# Patient Record
Sex: MALE | Race: WHITE | Employment: UNEMPLOYED | URBAN - NONMETROPOLITAN AREA
[De-identification: names, ages, dates, MRNs, and addresses within clinical notes are randomized per-mention and may not be internally consistent; named-entity substitution may affect disease eponyms.]

---

## 2021-09-12 ENCOUNTER — APPOINTMENT (OUTPATIENT)
Dept: MRI IMAGING | Age: 66
DRG: 552 | End: 2021-09-12
Payer: MEDICARE

## 2021-09-12 ENCOUNTER — APPOINTMENT (OUTPATIENT)
Dept: GENERAL RADIOLOGY | Age: 66
DRG: 552 | End: 2021-09-12
Payer: MEDICARE

## 2021-09-12 ENCOUNTER — HOSPITAL ENCOUNTER (INPATIENT)
Age: 66
LOS: 8 days | Discharge: HOME | DRG: 552 | End: 2021-09-20
Attending: EMERGENCY MEDICINE | Admitting: ORTHOPAEDIC SURGERY
Payer: MEDICARE

## 2021-09-12 DIAGNOSIS — M54.42 ACUTE LEFT-SIDED LOW BACK PAIN WITH LEFT-SIDED SCIATICA: Primary | ICD-10-CM

## 2021-09-12 DIAGNOSIS — R93.89 ABNORMAL MRI: ICD-10-CM

## 2021-09-12 PROBLEM — M54.9 BACK PAIN: Status: ACTIVE | Noted: 2021-09-12

## 2021-09-12 PROBLEM — M54.59 INTRACTABLE LOW BACK PAIN: Status: ACTIVE | Noted: 2021-09-12

## 2021-09-12 LAB
ANION GAP SERPL CALCULATED.3IONS-SCNC: 9 MEQ/L (ref 8–16)
BASOPHILS # BLD: 0.4 %
BASOPHILS ABSOLUTE: 0 THOU/MM3 (ref 0–0.1)
BUN BLDV-MCNC: 12 MG/DL (ref 7–22)
C-REACTIVE PROTEIN: 2.27 MG/DL (ref 0–1)
CALCIUM SERPL-MCNC: 9.7 MG/DL (ref 8.5–10.5)
CHLORIDE BLD-SCNC: 102 MEQ/L (ref 98–111)
CO2: 28 MEQ/L (ref 23–33)
CREAT SERPL-MCNC: 0.6 MG/DL (ref 0.4–1.2)
EOSINOPHIL # BLD: 0.4 %
EOSINOPHILS ABSOLUTE: 0 THOU/MM3 (ref 0–0.4)
ERYTHROCYTE [DISTWIDTH] IN BLOOD BY AUTOMATED COUNT: 13.1 % (ref 11.5–14.5)
ERYTHROCYTE [DISTWIDTH] IN BLOOD BY AUTOMATED COUNT: 48.2 FL (ref 35–45)
GFR SERPL CREATININE-BSD FRML MDRD: > 90 ML/MIN/1.73M2
GLUCOSE BLD-MCNC: 103 MG/DL (ref 70–108)
HCT VFR BLD CALC: 46.3 % (ref 42–52)
HEMOGLOBIN: 15.7 GM/DL (ref 14–18)
IMMATURE GRANS (ABS): 0.04 THOU/MM3 (ref 0–0.07)
IMMATURE GRANULOCYTES: 0.4 %
LYMPHOCYTES # BLD: 15.3 %
LYMPHOCYTES ABSOLUTE: 1.7 THOU/MM3 (ref 1–4.8)
MCH RBC QN AUTO: 34.1 PG (ref 26–33)
MCHC RBC AUTO-ENTMCNC: 33.9 GM/DL (ref 32.2–35.5)
MCV RBC AUTO: 100.7 FL (ref 80–94)
MONOCYTES # BLD: 10.2 %
MONOCYTES ABSOLUTE: 1.2 THOU/MM3 (ref 0.4–1.3)
NUCLEATED RED BLOOD CELLS: 0 /100 WBC
PLATELET # BLD: 236 THOU/MM3 (ref 130–400)
PMV BLD AUTO: 9.1 FL (ref 9.4–12.4)
POTASSIUM REFLEX MAGNESIUM: 4.3 MEQ/L (ref 3.5–5.2)
RBC # BLD: 4.6 MILL/MM3 (ref 4.7–6.1)
SEDIMENTATION RATE, ERYTHROCYTE: 3 MM/HR (ref 0–10)
SEG NEUTROPHILS: 73.3 %
SEGMENTED NEUTROPHILS ABSOLUTE COUNT: 8.3 THOU/MM3 (ref 1.8–7.7)
SODIUM BLD-SCNC: 139 MEQ/L (ref 135–145)
WBC # BLD: 11.3 THOU/MM3 (ref 4.8–10.8)

## 2021-09-12 PROCEDURE — 93005 ELECTROCARDIOGRAM TRACING: CPT | Performed by: PHYSICIAN ASSISTANT

## 2021-09-12 PROCEDURE — 87040 BLOOD CULTURE FOR BACTERIA: CPT

## 2021-09-12 PROCEDURE — 96375 TX/PRO/DX INJ NEW DRUG ADDON: CPT

## 2021-09-12 PROCEDURE — 6360000002 HC RX W HCPCS

## 2021-09-12 PROCEDURE — 72156 MRI NECK SPINE W/O & W/DYE: CPT

## 2021-09-12 PROCEDURE — 96374 THER/PROPH/DIAG INJ IV PUSH: CPT

## 2021-09-12 PROCEDURE — 71045 X-RAY EXAM CHEST 1 VIEW: CPT

## 2021-09-12 PROCEDURE — 1200000000 HC SEMI PRIVATE

## 2021-09-12 PROCEDURE — 99283 EMERGENCY DEPT VISIT LOW MDM: CPT

## 2021-09-12 PROCEDURE — 80048 BASIC METABOLIC PNL TOTAL CA: CPT

## 2021-09-12 PROCEDURE — 2580000003 HC RX 258: Performed by: PHYSICIAN ASSISTANT

## 2021-09-12 PROCEDURE — A9579 GAD-BASE MR CONTRAST NOS,1ML: HCPCS

## 2021-09-12 PROCEDURE — 72158 MRI LUMBAR SPINE W/O & W/DYE: CPT

## 2021-09-12 PROCEDURE — 6370000000 HC RX 637 (ALT 250 FOR IP)

## 2021-09-12 PROCEDURE — 85651 RBC SED RATE NONAUTOMATED: CPT

## 2021-09-12 PROCEDURE — 96372 THER/PROPH/DIAG INJ SC/IM: CPT

## 2021-09-12 PROCEDURE — 36415 COLL VENOUS BLD VENIPUNCTURE: CPT

## 2021-09-12 PROCEDURE — 6360000004 HC RX CONTRAST MEDICATION

## 2021-09-12 PROCEDURE — 96376 TX/PRO/DX INJ SAME DRUG ADON: CPT

## 2021-09-12 PROCEDURE — 72157 MRI CHEST SPINE W/O & W/DYE: CPT

## 2021-09-12 PROCEDURE — 86140 C-REACTIVE PROTEIN: CPT

## 2021-09-12 PROCEDURE — 85025 COMPLETE CBC W/AUTO DIFF WBC: CPT

## 2021-09-12 PROCEDURE — 6370000000 HC RX 637 (ALT 250 FOR IP): Performed by: PHYSICIAN ASSISTANT

## 2021-09-12 RX ORDER — MIDAZOLAM HYDROCHLORIDE 1 MG/ML
4 INJECTION INTRAMUSCULAR; INTRAVENOUS ONCE
Status: COMPLETED | OUTPATIENT
Start: 2021-09-12 | End: 2021-09-12

## 2021-09-12 RX ORDER — ONDANSETRON 4 MG/1
4 TABLET, ORALLY DISINTEGRATING ORAL EVERY 8 HOURS PRN
Status: DISCONTINUED | OUTPATIENT
Start: 2021-09-12 | End: 2021-09-20 | Stop reason: HOSPADM

## 2021-09-12 RX ORDER — SODIUM CHLORIDE 9 MG/ML
25 INJECTION, SOLUTION INTRAVENOUS PRN
Status: DISCONTINUED | OUTPATIENT
Start: 2021-09-12 | End: 2021-09-20 | Stop reason: HOSPADM

## 2021-09-12 RX ORDER — MIDAZOLAM HYDROCHLORIDE 1 MG/ML
2 INJECTION INTRAMUSCULAR; INTRAVENOUS ONCE
Status: COMPLETED | OUTPATIENT
Start: 2021-09-12 | End: 2021-09-12

## 2021-09-12 RX ORDER — CYCLOBENZAPRINE HCL 10 MG
10 TABLET ORAL 3 TIMES DAILY PRN
Status: DISCONTINUED | OUTPATIENT
Start: 2021-09-12 | End: 2021-09-20 | Stop reason: HOSPADM

## 2021-09-12 RX ORDER — MIDAZOLAM HYDROCHLORIDE 1 MG/ML
INJECTION INTRAMUSCULAR; INTRAVENOUS
Status: COMPLETED
Start: 2021-09-12 | End: 2021-09-12

## 2021-09-12 RX ORDER — FENTANYL CITRATE 50 UG/ML
50 INJECTION, SOLUTION INTRAMUSCULAR; INTRAVENOUS ONCE
Status: COMPLETED | OUTPATIENT
Start: 2021-09-12 | End: 2021-09-12

## 2021-09-12 RX ORDER — CYCLOBENZAPRINE HCL 10 MG
10 TABLET ORAL ONCE
Status: COMPLETED | OUTPATIENT
Start: 2021-09-12 | End: 2021-09-12

## 2021-09-12 RX ORDER — SODIUM CHLORIDE 0.9 % (FLUSH) 0.9 %
5-40 SYRINGE (ML) INJECTION EVERY 12 HOURS SCHEDULED
Status: DISCONTINUED | OUTPATIENT
Start: 2021-09-12 | End: 2021-09-20 | Stop reason: HOSPADM

## 2021-09-12 RX ORDER — FENTANYL CITRATE 50 UG/ML
INJECTION, SOLUTION INTRAMUSCULAR; INTRAVENOUS
Status: COMPLETED
Start: 2021-09-12 | End: 2021-09-12

## 2021-09-12 RX ORDER — SODIUM CHLORIDE 9 MG/ML
INJECTION, SOLUTION INTRAVENOUS CONTINUOUS
Status: DISCONTINUED | OUTPATIENT
Start: 2021-09-12 | End: 2021-09-20 | Stop reason: HOSPADM

## 2021-09-12 RX ORDER — KETOROLAC TROMETHAMINE 30 MG/ML
15 INJECTION, SOLUTION INTRAMUSCULAR; INTRAVENOUS ONCE
Status: DISCONTINUED | OUTPATIENT
Start: 2021-09-12 | End: 2021-09-12

## 2021-09-12 RX ORDER — OXYCODONE HYDROCHLORIDE 5 MG/1
5 TABLET ORAL EVERY 4 HOURS PRN
Status: DISCONTINUED | OUTPATIENT
Start: 2021-09-12 | End: 2021-09-20 | Stop reason: HOSPADM

## 2021-09-12 RX ORDER — OXYCODONE HYDROCHLORIDE 5 MG/1
10 TABLET ORAL EVERY 4 HOURS PRN
Status: DISCONTINUED | OUTPATIENT
Start: 2021-09-12 | End: 2021-09-20 | Stop reason: HOSPADM

## 2021-09-12 RX ORDER — MORPHINE SULFATE 2 MG/ML
2 INJECTION, SOLUTION INTRAMUSCULAR; INTRAVENOUS
Status: DISCONTINUED | OUTPATIENT
Start: 2021-09-12 | End: 2021-09-20 | Stop reason: HOSPADM

## 2021-09-12 RX ORDER — FENTANYL CITRATE 50 UG/ML
100 INJECTION, SOLUTION INTRAMUSCULAR; INTRAVENOUS ONCE
Status: COMPLETED | OUTPATIENT
Start: 2021-09-12 | End: 2021-09-12

## 2021-09-12 RX ORDER — MORPHINE SULFATE 4 MG/ML
4 INJECTION, SOLUTION INTRAMUSCULAR; INTRAVENOUS
Status: DISCONTINUED | OUTPATIENT
Start: 2021-09-12 | End: 2021-09-20 | Stop reason: HOSPADM

## 2021-09-12 RX ORDER — KETOROLAC TROMETHAMINE 30 MG/ML
15 INJECTION, SOLUTION INTRAMUSCULAR; INTRAVENOUS ONCE
Status: COMPLETED | OUTPATIENT
Start: 2021-09-12 | End: 2021-09-12

## 2021-09-12 RX ORDER — POLYETHYLENE GLYCOL 3350 17 G/17G
17 POWDER, FOR SOLUTION ORAL DAILY PRN
Status: DISCONTINUED | OUTPATIENT
Start: 2021-09-12 | End: 2021-09-20 | Stop reason: HOSPADM

## 2021-09-12 RX ORDER — SODIUM CHLORIDE 0.9 % (FLUSH) 0.9 %
5-40 SYRINGE (ML) INJECTION PRN
Status: DISCONTINUED | OUTPATIENT
Start: 2021-09-12 | End: 2021-09-20 | Stop reason: HOSPADM

## 2021-09-12 RX ORDER — ONDANSETRON 2 MG/ML
4 INJECTION INTRAMUSCULAR; INTRAVENOUS EVERY 6 HOURS PRN
Status: DISCONTINUED | OUTPATIENT
Start: 2021-09-12 | End: 2021-09-20 | Stop reason: HOSPADM

## 2021-09-12 RX ADMIN — MIDAZOLAM HYDROCHLORIDE 4 MG: 1 INJECTION INTRAMUSCULAR; INTRAVENOUS at 11:37

## 2021-09-12 RX ADMIN — FENTANYL CITRATE 50 MCG: 0.05 INJECTION, SOLUTION INTRAMUSCULAR; INTRAVENOUS at 10:28

## 2021-09-12 RX ADMIN — MIDAZOLAM 4 MG: 1 INJECTION INTRAMUSCULAR; INTRAVENOUS at 11:37

## 2021-09-12 RX ADMIN — GADOTERIDOL 10 ML: 279.3 INJECTION, SOLUTION INTRAVENOUS at 12:30

## 2021-09-12 RX ADMIN — OXYCODONE 10 MG: 5 TABLET ORAL at 20:45

## 2021-09-12 RX ADMIN — SODIUM CHLORIDE: 9 INJECTION, SOLUTION INTRAVENOUS at 15:52

## 2021-09-12 RX ADMIN — MIDAZOLAM 2 MG: 1 INJECTION INTRAMUSCULAR; INTRAVENOUS at 10:28

## 2021-09-12 RX ADMIN — OXYCODONE 10 MG: 5 TABLET ORAL at 15:20

## 2021-09-12 RX ADMIN — CYCLOBENZAPRINE 10 MG: 10 TABLET, FILM COATED ORAL at 09:34

## 2021-09-12 RX ADMIN — KETOROLAC TROMETHAMINE 15 MG: 30 INJECTION, SOLUTION INTRAMUSCULAR; INTRAVENOUS at 08:29

## 2021-09-12 RX ADMIN — FENTANYL CITRATE 100 MCG: 50 INJECTION, SOLUTION INTRAMUSCULAR; INTRAVENOUS at 11:36

## 2021-09-12 ASSESSMENT — PAIN DESCRIPTION - LOCATION
LOCATION: BACK
LOCATION: BACK

## 2021-09-12 ASSESSMENT — PAIN SCALES - GENERAL
PAINLEVEL_OUTOF10: 8
PAINLEVEL_OUTOF10: 7
PAINLEVEL_OUTOF10: 7
PAINLEVEL_OUTOF10: 8
PAINLEVEL_OUTOF10: 6
PAINLEVEL_OUTOF10: 0
PAINLEVEL_OUTOF10: 6
PAINLEVEL_OUTOF10: 8
PAINLEVEL_OUTOF10: 7

## 2021-09-12 ASSESSMENT — ENCOUNTER SYMPTOMS
NAUSEA: 0
ABDOMINAL PAIN: 0
CHEST TIGHTNESS: 0
COUGH: 0
DIARRHEA: 0
SINUS PAIN: 0
SHORTNESS OF BREATH: 0
CONSTIPATION: 0
PHOTOPHOBIA: 0
BACK PAIN: 1
VOMITING: 0
ABDOMINAL DISTENTION: 0
SINUS PRESSURE: 0

## 2021-09-12 ASSESSMENT — PAIN DESCRIPTION - ONSET
ONSET: ON-GOING
ONSET: ON-GOING

## 2021-09-12 ASSESSMENT — PAIN DESCRIPTION - PROGRESSION
CLINICAL_PROGRESSION: NOT CHANGED
CLINICAL_PROGRESSION: GRADUALLY WORSENING

## 2021-09-12 ASSESSMENT — PAIN DESCRIPTION - FREQUENCY
FREQUENCY: CONTINUOUS
FREQUENCY: CONTINUOUS

## 2021-09-12 ASSESSMENT — PAIN DESCRIPTION - ORIENTATION
ORIENTATION: LOWER
ORIENTATION: LOWER

## 2021-09-12 ASSESSMENT — PAIN DESCRIPTION - PAIN TYPE
TYPE: ACUTE PAIN
TYPE: OTHER (COMMENT)

## 2021-09-12 ASSESSMENT — PAIN - FUNCTIONAL ASSESSMENT
PAIN_FUNCTIONAL_ASSESSMENT: PREVENTS OR INTERFERES SOME ACTIVE ACTIVITIES AND ADLS
PAIN_FUNCTIONAL_ASSESSMENT: PREVENTS OR INTERFERES SOME ACTIVE ACTIVITIES AND ADLS

## 2021-09-12 ASSESSMENT — PAIN DESCRIPTION - DESCRIPTORS
DESCRIPTORS: STABBING
DESCRIPTORS: SORE;ACHING;SHARP;SHOOTING

## 2021-09-12 NOTE — ED NOTES
Patient transferred to [facility] room 008 nurse informed.       Tona Mcgee  09/12/21 4722

## 2021-09-12 NOTE — ED NOTES
Report received from [facility].  Pt remains at [facility], [address] [place] [address]  09/12/21 2487

## 2021-09-12 NOTE — ED TRIAGE NOTES
Patient presents with lumbar pain that started Wednesday night. States he threw his back out. States he has has back surgery about 35 years ago.

## 2021-09-12 NOTE — ED PROVIDER NOTES
[facility]  EMERGENCY MEDICINE ATTENDING ATTESTATION      Evaluation of [facility]. Case discussed and care plan developed with resident physician. I agree with the resident physician documentation and plan as documented by him, except if my documentation differs. Patient seen, interviewed and examined by me. I reviewed the medical, surgical, family and social history, medications and allergies. I have reviewed the nursing documentation. I have reviewed the patient's vital signs and are abnormal from Mild hypertension per my interpretation. There is no height or weight on file to calculate BMI. Pulsoxymetry is normal per my interpretation. Brief H&P   Patient c/o back pain. Patient states for the last 3 days has had persistent mid and left-sided back pain, radiated to the left leg. Denies fever, chills, weight loss. Patient states his pain is worse with rest and better with movement and sitting, much worse when laying down. Patient has remote history of IVDA and history of the lumbar spine instrumentation. Physical exam is notable for well appearing, midline lumbar area scar, nontender to touch. There is midline and left paraspinal tenderness. Negative straight leg raise. Medical Decision Making   MDM:   1. Back pain, some positive red flags raising concern for possible spinal infection as a cause of this patient's back pain. Plan:    Analgesia, patient asks not to receive opiate medications. Casandra Anaya, labs   Imaging   Observation    Please see the resident physician completed note for final disposition except as documented on this attestation. I have reviewed and interpreted all available lab, radiology and ekg results available at the moment. Diagnosis, treatment and disposition plans were discussed and agreed upon by patient. This transcription was electronically signed.  It was dictated by use of voice recognition software and electronically transcribed. The transcription may contain errors not detected in proofreading.      I performed direct supervision and was present for the critical portion following procedures: None  Critical care time on this case: None    Electronically signed by Mik Beck MD on 9/12/21 at 9:32 AM EDT       Mik Beck MD  09/12/21 7494

## 2021-09-12 NOTE — ED NOTES
Pt returned to room 20 from MRI.  Pt asleep on cot, respirations easy and unlabored     Cory Meléndez, MEREDITH  09/12/21 6769

## 2021-09-12 NOTE — ED NOTES
Patient to MRI at this time.  Medicated for pain prior to exam      Stefano Daniels RN  09/12/21 5918

## 2021-09-12 NOTE — ED NOTES
Dr. Barnett Pi requesting 2mg of versed. States he will give medication. Up to 4mg if needed for patient in MRI.       Haseeb Thorne RN  09/12/21 1633

## 2021-09-12 NOTE — ED PROVIDER NOTES
[facility]NCOUN[facility]          Pt Name: Jessy Diggs  MRN: [id number]  [place] 1955  Date of evaluation: 9/12/2021  Treating Resident Physician: Ed Newell MD  Supervising Physician: Dr. Santosh Ca       Chief Complaint   Patient presents with    Back Pain     threw his back out wednesday      History obtained from the patient. HISTORY OF PRESENT ILLNESS    HPI  Jessy Diggs is a 77 y.o. male who presents to the emergency department for evaluation of back pain. Patient states that he woke up Thursday morning with severe lower back pain on the left side radiating down his thigh into his knee. Patient says the pain is accompanied with occasional numbness. Patient states the pain is 7 out of 10 intensity. Patient states the pain is worse with lying flat and better with sitting in chair. Patient says that he try topical lidocaine patches and ibuprofen with no relief. Patient says that prior to the start of the pain he was having a normal day. Patient was lying michelle the day before. 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 Patient states that he came in today because the pain becoming unbearable and he has been unable to sleep. Patient says that he was diagnosed with a ruptured disc in S4 requiring surgery about 35 years ago. 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 Patient does endorse a pack a day smoking habit as well as about 3 beers per night. Patient says he is never had withdrawals or problems related to alcohol before. Patient does endorse concurrent recreational marijuana use. Patient does deny any other recreational drugs.   Patient is not experiencing any bladder or bowel incontinence or saddle anesthesia. Patient does not recall any traumas. The patient has no other acute complaints at this time. REVIEW OF SYSTEMS   Review of Systems   Constitutional: Negative for activity change, chills, fatigue and fever. HENT: Negative for sinus pressure and sinus pain. Eyes: Negative for photophobia and visual disturbance. Respiratory: Negative for cough, chest tightness and shortness of breath. Cardiovascular: Negative for chest pain and leg swelling. Gastrointestinal: Negative for abdominal distention, abdominal pain, constipation, diarrhea, nausea and vomiting. Genitourinary: Negative for dysuria and hematuria. Musculoskeletal: Positive for back pain. Neurological: Positive for weakness and numbness. Negative for dizziness, light-headedness and headaches.          PAST MEDICAL AND SURGICAL HISTORY     Past Medical History:   Diagnosis Date    Ruptured disk      Past Surgical History:   Procedure Laterality Date    BACK SURGERY           MEDICATIONS     Current Facility-Administered Medications:     0.9 % sodium chloride infusion, , IntraVENous, Continuous, Nathalie Valerie, PA-C, Last Rate: 100 mL/hr at 09/12/21 1552, New Bag at 09/12/21 1552    sodium chloride flush 0.9 % injection 5-40 mL, 5-40 mL, IntraVENous, 2 times per day, Nathalie Valerie, PA-C    sodium chloride flush 0.9 % injection 5-40 mL, 5-40 mL, IntraVENous, PRN, Nathalie Valerie, PA-C    0.9 % sodium chloride infusion, 25 mL, IntraVENous, PRN, Nathalie Valerie, PA-C    ondansetron (ZOFRAN-ODT) disintegrating tablet 4 mg, 4 mg, Oral, Q8H PRN **OR** ondansetron (ZOFRAN) injection 4 mg, 4 mg, IntraVENous, Q6H PRN, Nathalie Valerie, PA-C    polyethylene glycol (GLYCOLAX) packet 17 g, 17 g, Oral, Daily PRN, Nathalie Valerie, PA-C    enoxaparin (LOVENOX) injection 40 mg, 40 mg, SubCUTAneous, Nightly, Nathalie Valerie, PA-C    morphine (PF) injection 2 mg, 2 mg, IntraVENous, Q2H PRN **OR** morphine injection 4 mg, 4 mg, IntraVENous, Q2H PRN, Maurilio Reyes PA-C    oxyCODONE (ROXICODONE) immediate release tablet 5 mg, 5 mg, Oral, Q4H PRN **OR** oxyCODONE (ROXICODONE) immediate release tablet 10 mg, 10 mg, Oral, Q4H PRN, Maurilio Reyes PA-C, 10 mg at 09/12/21 1520    cyclobenzaprine (FLEXERIL) tablet 10 mg, 10 mg, Oral, TID PRN, Maurilio Reyes PA-C      SOCIAL HISTORY     Social History     Social History Narrative    Not on file     Social History     Tobacco Use    Smoking status: Current Every Day Smoker     Packs/day: 1.00     Years: 40.00     Pack years: 40.00     Types: Cigarettes   Substance Use Topics    Alcohol use: Yes     Alcohol/week: 14.0 standard drinks     Types: 14 Cans of beer per week    Drug use: Not Currently     Types: IV, Opiates , Marijuana         ALLERGIES     Allergies   Allergen Reactions    Compazine [Prochlorperazine]          FAMILY HISTORY     Family History   Family history unknown: Yes         PREVIOUS RECORDS   Previous records reviewed: Patient previously seen in 2012. Medical record unavailbale. Jacquie Kurtz PHYSICAL EXAM     ED Triage Vitals [09/12/21 0736]   BP Temp Temp src Pulse Resp SpO2 Height Weight   (!) 129/93 -- -- 73 17 97 % -- --     Initial vital signs and nursing assessment reviewed and normal. Body mass index is 22.41 kg/m². Pulsoximetry is normal per my interpretation. Additional Vital Signs:  Vitals:    09/12/21 1445   BP: (!) 169/83   Pulse: 57   Resp: 16   Temp: 97.6 °F (36.4 °C)   SpO2: 99%       Physical Exam  Vitals and nursing note reviewed. Constitutional:       Appearance: He is normal weight. HENT:      Head: Normocephalic and atraumatic. Eyes:      Extraocular Movements: Extraocular movements intact. Pupils: Pupils are equal, round, and reactive to light. Cardiovascular:      Rate and Rhythm: Normal rate and regular rhythm. Pulses: Normal pulses. Heart sounds: Normal heart sounds.    Pulmonary:      Effort: Pulmonary effort is normal.      Breath sounds: Normal breath sounds. Abdominal:      General: Bowel sounds are normal.      Palpations: Abdomen is soft. Musculoskeletal:      Cervical back: Normal.      Thoracic back: Normal.      Lumbar back: Tenderness present. No swelling, edema, deformity, signs of trauma or lacerations. Decreased range of motion. No scoliosis. Right hip: Normal.      Left hip: Tenderness present. Comments: Patient has decreased range of motion of the left side secondary to back pain. There is an area of focal tenderness lateral to the paraspinal muscles of the lower back. Unable to perform straight leg test secondary to patient's inability to lie flat. Skin:     General: Skin is warm and dry. Capillary Refill: Capillary refill takes less than 2 seconds. Neurological:      General: No focal deficit present. Mental Status: He is alert. Cranial Nerves: Cranial nerves are intact. Sensory: Sensory deficit present. Motor: Weakness present. No tremor or atrophy. Coordination: Coordination is intact. Gait: Gait abnormal.      Comments: Patient expresses decreased sensation on the lateral left thigh compared to the right. Patient also has 4 out of 5 strength on the left compared to 5 out of 5 on the right. Patient's gait is abnormal secondary to lower back pain. MEDICAL DECISION MAKING   Initial Assessment:   1.  Claudication versus sciatica  Plan:    Toradol IM for pain and check temperature then reevaluate        ED RESULTS   Laboratory results:  Labs Reviewed   CBC WITH AUTO DIFFERENTIAL - Abnormal; Notable for the following components:       Result Value    WBC 11.3 (*)     RBC 4.60 (*)     .7 (*)     MCH 34.1 (*)     RDW-SD 48.2 (*)     MPV 9.1 (*)     Segs Absolute 8.3 (*)     All other components within normal limits   C-REACTIVE PROTEIN - Abnormal; Notable for the following components:    CRP 2.27 (*)     All other components within normal limits   CULTURE, BLOOD 1 CULTURE, BLOOD 2   BASIC METABOLIC PANEL W/ REFLEX TO MG FOR LOW K   SEDIMENTATION RATE   ANION GAP   GLOMERULAR FILTRATION RATE, ESTIMATED   URINALYSIS WITH MICROSCOPIC       Radiologic studies results:  XR CHEST PORTABLE   Final Result      No acute intrathoracic process. **This report has been created using voice recognition software. It may contain minor errors which are inherent in voice recognition technology. **      Final report electronically signed by Dr. Hali Griffin on 9/12/2021 3:14 PM      MRI LUMBAR SPINE W WO CONTRAST   Final Result       1. Severe spinal canal stenosis at the L2-3 and L3-4 levels due to a combination of degenerative changes. 2. Enhancement on the right at the L2-3 level along the disc margin. There is no associated bone destruction. There is no fluid signal in the disc. There is some enhancement associated the facet joints bilaterally at the L2-3 and L3-4 level, particularly    on the left facet joint of L3-4. There is also some enhancement surrounding the thecal sac and there is some enhancement of the nerve roots of the cauda equina. There is no bone destruction or fluid in the disc space to suggest infection in the    vertebral bodies and adjacent discs. There is no epidural abscess. The enhancement is most likely related to the degenerative changes and severe spinal canal stenosis with venous engorgement. Early infection cannot be excluded. 3. Spinal canal and foraminal stenosis at other lumbar levels as described. These findings were discussed with Dr. Timothy Car, at 1:45 PM on 9/12/2021. **This report has been created using voice recognition software. It may contain minor errors which are inherent in voice recognition technology. **      Final report electronically signed by Dr. Rayne Lundborg on 9/12/2021 1:51 PM      MRI CERVICAL SPINE W WO CONTRAST   Final Result      1.  Severe spinal canal stenosis with severe bilateral foraminal stenosis at the C5-6 level due to a combination of degenerative changes. 2. Spinal canal and foraminal stenosis at the other cervical levels as described. 3. No evidence of infection in the spine. **This report has been created using voice recognition software. It may contain minor errors which are inherent in voice recognition technology. **      Final report electronically signed by Dr. Sapna Reis on 9/12/2021 1:32 PM      MRI THORACIC SPINE W WO CONTRAST   Final Result    Degenerative changes in the breast spine without significant spinal canal stenosis at any level. **This report has been created using voice recognition software. It may contain minor errors which are inherent in voice recognition technology. **      Final report electronically signed by Dr. Sapna Reis on 9/12/2021 1:32 PM          ED Medications administered this visit:   Medications   0.9 % sodium chloride infusion ( IntraVENous New Bag 9/12/21 9772)   sodium chloride flush 0.9 % injection 5-40 mL (has no administration in time range)   sodium chloride flush 0.9 % injection 5-40 mL (has no administration in time range)   0.9 % sodium chloride infusion (has no administration in time range)   ondansetron (ZOFRAN-ODT) disintegrating tablet 4 mg (has no administration in time range)     Or   ondansetron (ZOFRAN) injection 4 mg (has no administration in time range)   polyethylene glycol (GLYCOLAX) packet 17 g (has no administration in time range)   enoxaparin (LOVENOX) injection 40 mg (has no administration in time range)   morphine (PF) injection 2 mg (has no administration in time range)     Or   morphine injection 4 mg (has no administration in time range)   oxyCODONE (ROXICODONE) immediate release tablet 5 mg ( Oral See Alternative 9/12/21 1520)     Or   oxyCODONE (ROXICODONE) immediate release tablet 10 mg (10 mg Oral Given 9/12/21 1520)   cyclobenzaprine (FLEXERIL) tablet 10 mg (has no administration in time range)   ketorolac (TORADOL) injection 15 mg (15 mg IntraMUSCular Given 9/12/21 0829)   cyclobenzaprine (FLEXERIL) tablet 10 mg (10 mg Oral Given 9/12/21 0934)   midazolam (VERSED) injection 2 mg (2 mg IntraVENous Given 9/12/21 1028)   fentaNYL (SUBLIMAZE) injection 50 mcg (50 mcg IntraVENous Given 9/12/21 1028)   fentaNYL (SUBLIMAZE) injection 100 mcg (100 mcg IntraVENous Given by Other 9/12/21 1136)   midazolam (VERSED) injection 4 mg (4 mg IntraVENous Given by Other 9/12/21 1137)   gadoteridol (PROHANCE) injection 10 mL (10 mLs IntraVENous Given 9/12/21 1230)         ED COURSE     ED Course as of Sep 12 1647   Sun Sep 12, 2021   0820 Patient states that he is uninsured and is concerned about expensive medical bills. Patient is requesting minimal testing if we can avoid. Given the patient's remote IV heroin use there is concern for vertebral involvement. Current plan is to give some pain relief and check temperature. If pain is controlled with Toradol and temperature is within normal limit limits vertebral infection is less likely and otherwise patient will need more intense work-up for possible spinal infection. Discussed with patient patient is in agreement with this plan. Will reevaluate. [DARIELA]   [id number] Patient is afebrile   Temp: 98 °F (36.7 °C) [DARIELA]   1159 She was initially given 2 of Versed and 50 of fentanyl for pain control and anxiolysis however patient was not well controlled. Patient was given an additional 2 of Versed at 1048 and 2 more Versed at 1052 and patient was still unable to lie flat for the procedure. Patient was then given 50 of fentanyl at 1108 and then 50 more at 1119 and patient was finally able to tolerate lying on his back for procedure.     [DARIELA]   1305 WBC(!): 11.3 [DARIELA]   1305 C-reactive protein [DARIELA]   0946 Basic Metabolic Panel w/ Reflex to MG:    Sodium 139   Potassium 4.3   Chloride 102   CO2 28   Glucose 103   BUN 12   Creatinine 0.6   Calcium 9.7 [DARIELA]   1306 Sedimentation Rate:    Sed Rate 3 [DARIELA]   1306 MRI CERVICAL SPINE W WO CONTRAST [DARIELA]   2037 MRI LUMBAR SPINE W WO CONTRAST [DARIELA]   0466 Right spine showed moderate stenosis in the area of his pain. Spoke with Dr. Nick Duncan who is going to review the patient's MRI and give recommendations. MRI THORACIC SPINE W WO CONTRAST [DARIELA]   8450 Orthopedic surgery accepted admission to [address] [place] [address]    [DARIELA]      ED Course User Index  [DARIELA] Neva Parsons MD           MEDICATION CHANGES     There are no discharge medications for this patient. FINAL DISPOSITION     Final diagnoses:   Acute left-sided low back pain with left-sided sciatica   Abnormal MRI     Condition: condition: stable  Dispo: Admit to med/surg floor      This transcription was electronically signed. Parts of this transcriptions may have been dictated by use of voice recognition software and electronically transcribed, and parts may have been transcribed with the assistance of an ED scribe. The transcription may contain errors not detected in proofreading. Please refer to my supervising physician's documentation if my documentation differs.     Electronically Signed: Aris Max MD, 09/12/21, 4:47 PM          Neva Parsons MD  Resident  09/12/21 2386

## 2021-09-12 NOTE — ED NOTES
ED to inpatient nurses report    Chief Complaint   Patient presents with    Back Pain     threw his back out wednesday       Present to ED from home  LOC: alert and orientated to name, place, date  Vital signs   Vitals:    09/12/21 0934 09/12/21 0935 09/12/21 1030 09/12/21 1347   BP:  (!) 158/74     Pulse: 65  68 76   Resp: 15  17 15   Temp:       TempSrc:       SpO2: 99%   96%      Oxygen Baseline RA    Current needs required RA Bipap/Cpap No  LDAs:   Peripheral IV 09/12/21 Proximal;Right; Anterior Forearm (Active)     Mobility: Requires assistance * 2  Pending ED orders: none  Present condition: Pt asleep on cot, respirations easy and unlabored.        Electronically signed by Theron Lund RN on 9/12/2021 at 2:20 PM     Clark Kelsey RN  09/12/21 1857

## 2021-09-12 NOTE — Clinical Note
Patient Class: Inpatient [101]   REQUIRED: Diagnosis: Intractable low back pain [108361]   Estimated Length of Stay: Estimated stay of more than 2 midnights   Admitting Provider: Lyndsey Christian [[id number]]   Preferred Department: [facility]   Telemetry/Cardiac Monitoring Required?: No

## 2021-09-13 LAB
BACTERIA: NORMAL
BASOPHILS # BLD: 0.3 %
BASOPHILS ABSOLUTE: 0 THOU/MM3 (ref 0–0.1)
BILIRUBIN URINE: NEGATIVE
BLOOD, URINE: NEGATIVE
C-REACTIVE PROTEIN: 4.38 MG/DL (ref 0–1)
CASTS: NORMAL /LPF
CASTS: NORMAL /LPF
CHARACTER, URINE: CLEAR
COLOR: YELLOW
CRYSTALS: NORMAL
EKG ATRIAL RATE: 57 BPM
EKG P AXIS: 64 DEGREES
EKG P-R INTERVAL: 162 MS
EKG Q-T INTERVAL: 380 MS
EKG QRS DURATION: 84 MS
EKG QTC CALCULATION (BAZETT): 369 MS
EKG R AXIS: -29 DEGREES
EKG T AXIS: 39 DEGREES
EKG VENTRICULAR RATE: 57 BPM
EOSINOPHIL # BLD: 1.1 %
EOSINOPHILS ABSOLUTE: 0.1 THOU/MM3 (ref 0–0.4)
EPITHELIAL CELLS, UA: NORMAL /HPF
ERYTHROCYTE [DISTWIDTH] IN BLOOD BY AUTOMATED COUNT: 12.9 % (ref 11.5–14.5)
ERYTHROCYTE [DISTWIDTH] IN BLOOD BY AUTOMATED COUNT: 48 FL (ref 35–45)
GLUCOSE, URINE: NEGATIVE MG/DL
HCT VFR BLD CALC: 39.9 % (ref 42–52)
HEMOGLOBIN: 13.4 GM/DL (ref 14–18)
IMMATURE GRANS (ABS): 0.02 THOU/MM3 (ref 0–0.07)
IMMATURE GRANULOCYTES: 0.2 %
KETONES, URINE: NEGATIVE
LEUKOCYTE ESTERASE, URINE: NEGATIVE
LYMPHOCYTES # BLD: 17.3 %
LYMPHOCYTES ABSOLUTE: 1.7 THOU/MM3 (ref 1–4.8)
MCH RBC QN AUTO: 33.9 PG (ref 26–33)
MCHC RBC AUTO-ENTMCNC: 33.6 GM/DL (ref 32.2–35.5)
MCV RBC AUTO: 101 FL (ref 80–94)
MISCELLANEOUS LAB TEST RESULT: NORMAL
MONOCYTES # BLD: 8.4 %
MONOCYTES ABSOLUTE: 0.8 THOU/MM3 (ref 0.4–1.3)
NITRITE, URINE: NEGATIVE
NUCLEATED RED BLOOD CELLS: 0 /100 WBC
PH UA: 5.5 (ref 5–9)
PLATELET # BLD: 212 THOU/MM3 (ref 130–400)
PMV BLD AUTO: 9.6 FL (ref 9.4–12.4)
PROTEIN UA: NEGATIVE MG/DL
RBC # BLD: 3.95 MILL/MM3 (ref 4.7–6.1)
RBC URINE: NORMAL /HPF
RENAL EPITHELIAL, UA: NORMAL
SEDIMENTATION RATE, ERYTHROCYTE: 15 MM/HR (ref 0–10)
SEG NEUTROPHILS: 72.7 %
SEGMENTED NEUTROPHILS ABSOLUTE COUNT: 7.3 THOU/MM3 (ref 1.8–7.7)
SPECIFIC GRAVITY UA: 1.02 (ref 1–1.03)
UROBILINOGEN, URINE: 1 EU/DL (ref 0–1)
WBC # BLD: 10.1 THOU/MM3 (ref 4.8–10.8)
WBC UA: NORMAL /HPF
YEAST: NORMAL

## 2021-09-13 PROCEDURE — 6370000000 HC RX 637 (ALT 250 FOR IP): Performed by: PHYSICIAN ASSISTANT

## 2021-09-13 PROCEDURE — 97530 THERAPEUTIC ACTIVITIES: CPT

## 2021-09-13 PROCEDURE — 81001 URINALYSIS AUTO W/SCOPE: CPT

## 2021-09-13 PROCEDURE — 2580000003 HC RX 258: Performed by: PHYSICIAN ASSISTANT

## 2021-09-13 PROCEDURE — 36415 COLL VENOUS BLD VENIPUNCTURE: CPT

## 2021-09-13 PROCEDURE — 85025 COMPLETE CBC W/AUTO DIFF WBC: CPT

## 2021-09-13 PROCEDURE — 97162 PT EVAL MOD COMPLEX 30 MIN: CPT

## 2021-09-13 PROCEDURE — 86140 C-REACTIVE PROTEIN: CPT

## 2021-09-13 PROCEDURE — 85651 RBC SED RATE NONAUTOMATED: CPT

## 2021-09-13 PROCEDURE — 1200000000 HC SEMI PRIVATE

## 2021-09-13 PROCEDURE — 97165 OT EVAL LOW COMPLEX 30 MIN: CPT

## 2021-09-13 RX ADMIN — OXYCODONE 10 MG: 5 TABLET ORAL at 05:13

## 2021-09-13 RX ADMIN — OXYCODONE 5 MG: 5 TABLET ORAL at 09:22

## 2021-09-13 RX ADMIN — SODIUM CHLORIDE: 9 INJECTION, SOLUTION INTRAVENOUS at 11:13

## 2021-09-13 RX ADMIN — SODIUM CHLORIDE: 9 INJECTION, SOLUTION INTRAVENOUS at 21:08

## 2021-09-13 RX ADMIN — SODIUM CHLORIDE: 9 INJECTION, SOLUTION INTRAVENOUS at 01:01

## 2021-09-13 RX ADMIN — OXYCODONE 10 MG: 5 TABLET ORAL at 13:21

## 2021-09-13 RX ADMIN — OXYCODONE 10 MG: 5 TABLET ORAL at 21:49

## 2021-09-13 RX ADMIN — OXYCODONE 10 MG: 5 TABLET ORAL at 01:01

## 2021-09-13 RX ADMIN — OXYCODONE 10 MG: 5 TABLET ORAL at 17:49

## 2021-09-13 ASSESSMENT — PAIN DESCRIPTION - ONSET
ONSET: ON-GOING
ONSET: PROGRESSIVE
ONSET: ON-GOING

## 2021-09-13 ASSESSMENT — PAIN DESCRIPTION - ORIENTATION
ORIENTATION: LOWER
ORIENTATION: LOWER;LEFT
ORIENTATION: LOWER
ORIENTATION: LOWER;LEFT

## 2021-09-13 ASSESSMENT — PAIN DESCRIPTION - PROGRESSION
CLINICAL_PROGRESSION: GRADUALLY WORSENING
CLINICAL_PROGRESSION: GRADUALLY IMPROVING
CLINICAL_PROGRESSION: NOT CHANGED
CLINICAL_PROGRESSION: GRADUALLY IMPROVING
CLINICAL_PROGRESSION: GRADUALLY IMPROVING
CLINICAL_PROGRESSION: GRADUALLY WORSENING
CLINICAL_PROGRESSION: GRADUALLY IMPROVING
CLINICAL_PROGRESSION: GRADUALLY IMPROVING
CLINICAL_PROGRESSION: GRADUALLY WORSENING

## 2021-09-13 ASSESSMENT — PAIN DESCRIPTION - DESCRIPTORS
DESCRIPTORS: ACHING;SHOOTING
DESCRIPTORS: SHOOTING;ACHING
DESCRIPTORS: ACHING;SHOOTING
DESCRIPTORS: SHOOTING
DESCRIPTORS: ACHING;SHOOTING
DESCRIPTORS: ACHING;SHOOTING;SHARP
DESCRIPTORS: SHOOTING

## 2021-09-13 ASSESSMENT — PAIN SCALES - GENERAL
PAINLEVEL_OUTOF10: 0
PAINLEVEL_OUTOF10: 7
PAINLEVEL_OUTOF10: 6
PAINLEVEL_OUTOF10: 3
PAINLEVEL_OUTOF10: 7
PAINLEVEL_OUTOF10: 3
PAINLEVEL_OUTOF10: 6
PAINLEVEL_OUTOF10: 3
PAINLEVEL_OUTOF10: 7
PAINLEVEL_OUTOF10: 3
PAINLEVEL_OUTOF10: 4
PAINLEVEL_OUTOF10: 7
PAINLEVEL_OUTOF10: 4
PAINLEVEL_OUTOF10: 8
PAINLEVEL_OUTOF10: 7
PAINLEVEL_OUTOF10: 6
PAINLEVEL_OUTOF10: 7
PAINLEVEL_OUTOF10: 6

## 2021-09-13 ASSESSMENT — PAIN DESCRIPTION - LOCATION
LOCATION: BACK
LOCATION: BACK;HIP

## 2021-09-13 ASSESSMENT — ENCOUNTER SYMPTOMS
COUGH: 0
ABDOMINAL PAIN: 0
VOMITING: 0
NAUSEA: 0
SHORTNESS OF BREATH: 0
DIARRHEA: 0
CONSTIPATION: 0

## 2021-09-13 ASSESSMENT — PAIN DESCRIPTION - PAIN TYPE
TYPE: ACUTE PAIN

## 2021-09-13 ASSESSMENT — PAIN DESCRIPTION - FREQUENCY
FREQUENCY: CONTINUOUS

## 2021-09-13 ASSESSMENT — PAIN - FUNCTIONAL ASSESSMENT
PAIN_FUNCTIONAL_ASSESSMENT: ACTIVITIES ARE NOT PREVENTED
PAIN_FUNCTIONAL_ASSESSMENT: PREVENTS OR INTERFERES SOME ACTIVE ACTIVITIES AND ADLS

## 2021-09-13 NOTE — PROGRESS NOTES
Patient is awake, alert, and oriented x4. Speech is clear and articulate. Patient's activity is up with assistance. Pain present in lower back. Patient describes the pain as \"shooting and continuous\" at a 4/10. RN notified. Sensory is intact, no deficits noted. Pupils 3 mm bilaterally dilated and 1 mm bilaterally constricted. Sclera white bilaterally and conjunctiva pink and moist bilaterally. No tongue deviation/mouth droop. Hand grasp strong and equal bilaterally. Pedal push/pull equal and strong bilaterally. Psychosocial affect appropriate. Heart tones regular at the aortic, pulmonic, tricuspid, and mitral areas. No JVD or bruit bilaterally. Cap refill less than 3 seconds bilaterally at the index fingers and the great toes. Pulses 2 + and regular at the radial, pedal, and post tibial areas bilaterally. Edema not present. Sequential pumps ordered but not in use due to increased pain with use. Arms and legs FROM bilaterally. Gait is impaired and weak. Patient uses a cane. Patient is continent and uses a urinal. Skin is pink, warm, dry, and smooth. Skin turgor instantaneous bilaterally. Breathing is unlabored, no cough present. Lung sounds clear at the anterior, posterior, and lateral areas. Pulse Ox reading is 97%. Patient uses room-air. Abdomen is soft, non-tender, and flat. Bowel sounds hypoactive x4. Last BM was 3 days ago, RN notified. Oral mucosa pink and moist. Teeth in good condition. No dysphagia present and no difficulty chewing. IV access is a continuous on the right lower arm. Site and dressing clean and dry.

## 2021-09-13 NOTE — H&P
Orthopedic Spine H&P  Department of Orthopedic Surgery  Blane Valdze PA-C  Attending: Dr. Jacqueline Lynch    Chief Complaint: Intractable LBP and LLE radiculopathy    HPI: Brianna Guevara is a patient who presented to [facility] ED yesterday for a 3 day onset of severe LBP and left leg pain that radiated to his right hip and anterior lateral thigh stopping at the knee. He endorses a previous LS surgery more than 35 years ago and well as a hx of IVDA. He was worked up with MRI with and without contrast of his CS, TS, and LS. L2-L3 demonstrated an abnormal finding concerning for possible early discitis and osteomyelitis. CRP was elevated and he was admitted under our service for further evaluation. He denies any specific trauma noting that he was working on michelle the day prior to his symptoms. His pain became severe enough that he requires a cane for ambulatory assistance. He denies any fever, chills, CP, SOB, cough, myalgias or a change in his bowel or bladder control. ID has been consulted for another opinion regarding concern for osteomyelitis/discitis. Await consult. His pain is currently better controlled on 10mg Oxycodone. Assessment:    1. Intractable LBP and left leg pain   2. Hx of IVDA with abnormal findings at L2-L3, elevated inflammatory markers concern for possible early onset OM/discitis. Plan:    1. Consult ID   2. PT/OT eval and treatment today   3. Pain controlled, continue current regimen   4. May discontinue IVF if patient tolerating oral liquid intake. Allergies   Allergen Reactions    Compazine [Prochlorperazine]      Prior to Visit Medications    Not on File     Past Medical History:   Diagnosis Date    Ruptured disk      Past Surgical History:   Procedure Laterality Date    BACK SURGERY      TONSILLECTOMY         Review of Systems   Constitutional: Negative for diaphoresis, fatigue and fever. Respiratory: Negative for cough and shortness of breath.     Cardiovascular: Negative for chest pain and palpitations. Gastrointestinal: Negative for abdominal pain, constipation, diarrhea, nausea and vomiting. Genitourinary: Negative for difficulty urinating, dysuria and hematuria. Musculoskeletal: Positive for gait problem. Negative for arthralgias, myalgias and neck pain. Neurological: Negative for dizziness, seizures, weakness and headaches. Physical Exam  Constitutional:       General: He is not in acute distress. Appearance: Normal appearance. He is normal weight. HENT:      Head: Normocephalic and atraumatic. Pulmonary:      Effort: Pulmonary effort is normal.   Musculoskeletal:         General: No tenderness, deformity or signs of injury. Normal range of motion. Right lower leg: No edema. Left lower leg: No edema. Comments: 5/5 bilateral HF, KE, DF, EHL contraction, PF. Negative SLR and YODIT test bilaterally   Skin:     General: Skin is warm and dry. Comments: Well healed midline LS scar from previous LS surgery   Neurological:      General: No focal deficit present. Mental Status: He is alert and oriented to person, place, and time. Sensory: No sensory deficit. Motor: No weakness. Psychiatric:         Mood and Affect: Mood normal.         Behavior: Behavior normal.         Imaging:   MRI CERVICAL SPINE W WO CONTRAST    Result Date: 9/12/2021  PROCEDURE: MRI CERVICAL SPINE W WO CONTRAST CLINICAL INFORMATION: h/o iv drug use, concern for SEA . Remote history of IV drug abuse. Low back pain. Left hip and leg pain for 4 days. COMPARISON: No prior study. TECHNIQUE: Sagittal T1, T2 and STIR sequences were obtained through the cervical spine. Axial fast and echo and gradient echo T2-weighted images were obtained. Postcontrast axial and sagittal T1-weighted images were obtained. FINDINGS: There is reversal of the normal cervical lordosis. There are degenerative marrow changes throughout the cervical spine. There is disc desiccation throughout. There are anterior osteophytes at the C3-4, C4-5 and C5-6 levels. There is no bone marrow edema. No suspicious osseous lesions are present. The facet joints are normally aligned. The cervical spinal cord is of normal caliber and signal intensity. There is no abnormal enhancement in the cervical spinal cord. The visualized aspects of the posterior fossa are normal. On the axial images, there are facet degenerative changes and posterior disc osteophyte complexes at all cervical levels. There are also uncovertebral joint degenerative changes. The following degree of stenoses are noted: At C2-3, there is mild spinal canal stenosis. There is moderate severity bilateral foraminal stenosis. At C3-C4, there is mild spinal canal stenosis. There is severe left and no right foraminal stenosis. At C4-C5, there is moderate severity spinal canal stenosis. There is severe left and moderate severity right foraminal stenosis. At C5-C6, there is severe spinal canal stenosis. There is severe bilateral foraminal stenosis. At C6-C7, there is mild spinal canal stenosis. There is moderate severity bilateral foraminal stenosis. At C7-T1, there is no spinal canal stenosis. There is mild left foraminal stenosis. There are no suspicious findings in the cervical soft tissues. On the postcontrast images, there is no abnormal enhancement. There is no evidence of an epidural abscess. 1. Severe spinal canal stenosis with severe bilateral foraminal stenosis at the C5-6 level due to a combination of degenerative changes. 2. Spinal canal and foraminal stenosis at the other cervical levels as described. 3. No evidence of infection in the spine. **This report has been created using voice recognition software. It may contain minor errors which are inherent in voice recognition technology. ** Final report electronically signed by Dr. Fitz Mata on 9/12/2021 1:32 PM    MRI THORACIC SPINE W WO CONTRAST    Result Date: 9/12/2021  PROCEDURE: MRI THORACIC SPINE W WO CONTRAST CLINICAL INFORMATION: Back pain, multiple red flags, former IVDA. Low back pain. Left hip and leg pain for 4 days. IV drug use 35 years ago. COMPARISON: No prior study. TECHNIQUE: Sagittal T1, T2 and STIR sequences were obtained through the thoracic spine. Axial T2-weighted images were obtained through the discs. Postcontrast axial and sagittal T1-weighted images were obtained. FINDINGS: The thoracic vertebral bodies are normally aligned. There are no compression fractures. There is no suspicious marrow signal abnormality. There is heterogeneous marrow signal throughout. This is most compatible with degenerative marrow changes. There is  some degenerative marrow edema in the endplates. There is disc desiccation throughout. The thoracic spinal cord is of normal caliber and signal intensity. There are no abnormalities within the spinal canal. There is no abnormal enhancement. There is no evidence of epidural abscess. On the axial images, there is normal signal in the thoracic spinal cord throughout. At the T4-5 level, there is a small central disc protrusion. This does not contribute to spinal canal or foraminal stenosis. There is a left central disc protrusion at the T10-11 level. There is no significant spinal canal stenosis. There is no gross foraminal stenosis. There are degenerative changes the cervical spine seen on the localizer image. Degenerative changes in the breast spine without significant spinal canal stenosis at any level. **This report has been created using voice recognition software. It may contain minor errors which are inherent in voice recognition technology. ** Final report electronically signed by Dr. John Coker on 9/12/2021 1:32 PM    MRI LUMBAR SPINE W WO CONTRAST    Result Date: 9/12/2021  PROCEDURE: MRI LUMBAR SPINE W WO CONTRAST CLINICAL INFORMATION: h/o iv drug use, concern for SEA. COMPARISON: No prior study.  TECHNIQUE: Sagittal and axial T1 and T2-weighted images were obtained to the lumbar spine. Postcontrast axial and sagittal T1-weighted images were also obtained. FINDINGS: There is a levoscoliosis of the lumbar spine. There are degenerative marrow changes throughout the lumbar spine. There is disc desiccation throughout. There is some edema in the endplates on the left at the T12-L1, L1-L2, L2-3 and L3-4 levels. There is some edema in the soft tissues adjacent to the L2-3 level. There is some mild edema associated with facet degenerative changes on the left at the L3-4 level. After contrast, there is some mild enhancement of the soft tissues surrounding the L2-3 level and around the facet joints at the L2-3 level. There is circumferential enhancement of the epidural soft tissues surrounding the thecal sac at the L2-3 level. There is no abscess. There is no abscess in the psoas muscles. No focal suspicious osseous lesions are present. There are no compression fractures. No pars defects are noted. There is disc desiccation throughout. The visualized aspects of the distal spinal cord are normal. The nerve roots of the cauda equina and the tip of the conus are normal. There is disc desiccation loss of disc height throughout the thoracic spine. On the axial images, there are diffusely bulging discs and facet degenerative changes at all lumbar levels. The following degree of stenoses are noted: At T12-L1, there is mild spinal canal stenosis. There is mild left foraminal stenosis. At L1-L2, there is mild spinal canal stenosis. There is mild bilateral foraminal stenosis. There is narrowing of the subarticular recesses. At L2-3, there is severe spinal canal stenosis. This is due to degenerative changes. There is mild left and moderate severity right foraminal stenosis. As mentioned there is some enhancement of the soft tissue surrounding the thecal sac. This could represent some venous engorgement. At L3-4, there is severe stenosis of the thecal sac. There is moderate to severe bilateral foraminal stenosis. This is worse on the left than the right. There is some mild enhancement associated with the facet joints. There is no destruction of the facet  joints. There is some enhancement of the soft tissue surrounding the thecal sac. At L4-5, there is moderate stenosis of the thecal sac. There is stenosis of the left subarticular recess. There is severe left and mild right foraminal stenosis. At L5-S1, there is no spinal canal stenosis. There is stenosis of the right subarticular recess. There is severe bilateral foraminal stenosis. There is some mild uniform enhancement of the nerve roots of the cauda equina. This is best seen at the L4-5 and L5-S1 levels. There are no suspicious findings in the visualized aspects of the retroperitoneum and paraspinal soft tissues. 1. Severe spinal canal stenosis at the L2-3 and L3-4 levels due to a combination of degenerative changes. 2. Enhancement on the right at the L2-3 level along the disc margin. There is no associated bone destruction. There is no fluid signal in the disc. There is some enhancement associated the facet joints bilaterally at the L2-3 and L3-4 level, particularly  on the left facet joint of L3-4. There is also some enhancement surrounding the thecal sac and there is some enhancement of the nerve roots of the cauda equina. There is no bone destruction or fluid in the disc space to suggest infection in the vertebral bodies and adjacent discs. There is no epidural abscess. The enhancement is most likely related to the degenerative changes and severe spinal canal stenosis with venous engorgement. Early infection cannot be excluded. 3. Spinal canal and foraminal stenosis at other lumbar levels as described. These findings were discussed with Dr. Rosetta Cifuentes, at 1:45 PM on 9/12/2021. **This report has been created using voice recognition software.  It may contain minor errors which are inherent in voice recognition technology. ** Final report electronically signed by Dr. Yahir Delgado on 9/12/2021 1:51 PM    XR CHEST PORTABLE    Result Date: 9/12/2021  PROCEDURE: XR CHEST PORTABLE CLINICAL INFORMATION: Preoperative evaluation, surgery type not specified by ordering physician's assistant. TECHNIQUE: Mobile AP chest radiograph. COMPARISON: PA and lateral chest radiographs 3/9/2012 FINDINGS: Cardiomediastinal silhouette is within normal limits. Lungs are clear. Degenerative changes in the thoracic spine are poorly visualized. Soft tissues are unremarkable. No acute intrathoracic process. **This report has been created using voice recognition software. It may contain minor errors which are inherent in voice recognition technology. ** Final report electronically signed by Dr. Kenzie Brothers on 9/12/2021 3:14 PM      Electronically signed by Sheri So PA-C on 9/13/21 at 6:36 AM EDT

## 2021-09-13 NOTE — PROGRESS NOTES
[address] [place] [address]  INPATIENT PHYSICAL THERAPY  EVALUATION  [facility] ORTHOPEDICS 7K - [id number]    Time In: 7280  Time Out: 1110  Timed Code Treatment Minutes: 12 Minutes  Minutes: 23          Date: 2021  Patient Name: Fawad Murphy,  Gender:  male        MRN: [id number]  : 1955  (77 y.o.)      Referring Practitioner: Sheri So PA-C  Diagnosis: Intractable low back pain  Additional Pertinent Hx: Per EMR, \"Esdras Marie is a 77 y.o. male who presents to the emergency department for evaluation of back pain. Patient states that he woke up Thursday morning with severe lower back pain on the left side radiating down his thigh into his knee. Patient says the pain is accompanied with occasional numbness. Patient states the pain is 7 out of 10 intensity. Patient states the pain is worse with lying flat and better with sitting in chair. Patient says that he try topical lidocaine patches and ibuprofen with no relief. Patient says that prior to the start of the pain he was having a normal day. Patient was lying michelle the day before. 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 Patient states that he came in today because the pain becoming unbearable and he has been unable to sleep. Patient says that he was diagnosed with a ruptured disc in S4 requiring surgery about 35 years ago. 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 Patient does endorse a pack a day smoking habit as well as about 3 beers per night. Patient says he is never had withdrawals or problems related to alcohol before. Patient does endorse concurrent recreational marijuana use. Patient does deny any other recreational drugs. Patient is not experiencing any bladder or bowel incontinence or saddle anesthesia.   Patient does not recall any traumas. \"     Restrictions/Precautions:  Restrictions/Precautions: General Precautions, Fall Risk    Subjective: RN approved session. Patient pleasant and agreeable to session  Chart Reviewed: Yes  Patient assessed for rehabilitation services?: Yes  Family / Caregiver Present: No       General:  Overall Orientation Status: Within Normal Limits  Follows Commands: Within Functional Limits    Vision: Within Functional Limits    Hearing: Within functional limits         Pain: 5/10: left sided low back pain that radiates into LLE to knee (Patient reports he is having increased pain due to just standing in shower for prolonged period of time)    Vitals: Vitals not assessed per clinical judgement, see nursing flowsheet    Social/Functional History:    Lives With: Significant other  Type of Home: House  Home Layout: Two level, Performs ADL's on one level  Home Access: Stairs to enter with rails  Entrance Stairs - Number of Steps: 2 SARAH  Entrance Stairs - Rails: Left  Home Equipment: Cane     Bathroom Shower/Tub: Tub/Shower unit  Bathroom Toilet: Standard  Bathroom Accessibility: Accessible       ADL Assistance: Independent  Homemaking Assistance: Independent  Homemaking Responsibilities: Yes  Ambulation Assistance: Independent  Transfer Assistance: Independent    Active : Yes  Mode of Transportation: Car  Occupation: Retired  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  Patient impulsive at times and required verbal cueing for maintaining COG over NELL and decreased speed in order to reduce unsteadiness    Bed Mobility:  Rolling to Left: Independent Rolling to Right: Independent   Supine to Sit: Independent  Sit to Supine: Independent   Scooting: Independent    Transfers:  Sit to Stand: Supervision. Stand to Sit:Supervision Occasional verbal cueing required for hand placement and eccentric controlled descent    Ambulation:  Stand By Assistance  Distance: 70 feet x 2  Surface: Level Tile  Device:Cane  Gait Deviations: Forward Flexed Posture, Slow Radha, Decreased Weight Shift Left, Mild Path Deviations and Unsteady Gait. Patient became fatigued and required rest break after 70 feet due to increased complaints of low back pain and radiating pain into LLE. Exercise:  Patient was guided in 1 set(s) 15 reps of exercise to both lower extremities. Seated marches, Seated heel/toe raises, Long arc quads and Seated abduction/adduction. Exercises were completed for increased independence with functional mobility. Functional Outcome Measures: Completed  AM-PAC Inpatient Mobility Raw Score : 17  AM-PAC Inpatient T-Scale Score : 42.13    ASSESSMENT:   Activity Tolerance:  Patient tolerance of  treatment: good. Treatment Initiated: Treatment and education initiated within context of evaluation. Evaluation time included review of current medical information, gathering information related to past medical, social and functional history, completion of standardized testing, formal and informal observation of tasks, assessment of data and development of plan of care and goals. Treatment time included skilled education and facilitation of tasks to increase safety and independence with functional mobility for improved independence and quality of life. Assessment: Body structures, Functions, Activity limitations: Increased pain, Decreased balance, Decreased strength, Decreased endurance, Decreased functional mobility   Assessment: Patient is a 77 y.o. male who presents to skilled PT treatment due to increased complaints of increased low back pain.  PT evaluation completed and with clinical observation and assesment patient presents with the following functional mobiltiy deficits: decreased dynamic balance, decreased gait mechanics, decreased functional capacity and decreased BLE strength all of which limit his overall functional mobility and therefore can benefit from skilled PT treatment. 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 Short term goal 2: Patient to negotiate 2 steps with handrail Indepenent in order to get into and out of home. Short term goal 3: Patient to demonstrate Fair + dynamic standing balance in order to reduce risk for falls. Long term goals  Time Frame for Long term goals : NA    Following session, patient left in safe position with all fall risk precautions in place.

## 2021-09-13 NOTE — PROGRESS NOTES
[address] 60  INPATIENT OCCUPATIONAL THERAPY  [facility] ORTHOPEDICS 7K  EVALUATION    Time:   Time In: 827  Time Out: 8832  Timed Code Treatment Minutes: 12 Minutes  Minutes: 20    Date: 2021  Patient Name: Rosa Blue,   Gender: male      MRN: [id number]  : 1955  (77 y.o.)  Referring Practitioner: Christen Carlin PA-C  Diagnosis: Intractable low back pain  Additional Pertinent Hx: Per EMR, \"Esdras Marie is a 77 y.o. male who presents to the emergency department for evaluation of back pain. Patient states that he woke up Thursday morning with severe lower back pain on the left side radiating down his thigh into his knee. Patient says the pain is accompanied with occasional numbness. Patient states the pain is 7 out of 10 intensity. Patient states the pain is worse with lying flat and better with sitting in chair. Patient says that he try topical lidocaine patches and ibuprofen with no relief. Patient says that prior to the start of the pain he was having a normal day. Patient was lying michelle the day before. 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 Patient states that he came in today because the pain becoming unbearable and he has been unable to sleep. Patient says that he was diagnosed with a ruptured disc in S4 requiring surgery about 35 years ago. 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 Patient does endorse a pack a day smoking habit as well as about 3 beers per night. Patient says he is never had withdrawals or problems related to alcohol before. Patient does endorse concurrent recreational marijuana use. Patient does deny any other recreational drugs. Patient is not experiencing any bladder or bowel incontinence or saddle anesthesia.   Patient does not recall any goals  Time Frame for Short term goals: Until discharge  Short term goal 1: pt will complete BUE light resistive exercises with min vcs for technique to increase indep and endurance with all self cares and return to working. Short term goal 2: Pt will complete standing tolerance x 4 minutes with 2 UE release and S to increase indep with sinkside grooming. Short term goal 3: Pt will complete LB dressing with LHAE PRN and S to increase indep and endurance with self care routine. Short term goal 4: Pt will complete functional mobility to/from BR and HH distances with S and 0 vcs for safety to increase indep and endurance with all self cares. Following session, patient left in safe position with all fall risk precautions in place.

## 2021-09-13 NOTE — CONSULTS
[address] [place] [address]                                  CONSULTATION    PATIENT NAME: Tomasa Deng                 :        1955  MED REC NO:   [id number]                           ROOM:       0008  ACCOUNT NO:   [de-identified]                           ADMIT DATE: 2021  PROVIDER:     Christen Sierra. ARMIN Lutz:  2021    HISTORY OF PRESENT ILLNESS:  He is a 77-year-old male patient who was  admitted with pain on his lower back, radiating to his gluteal area of  three days' duration prior to his admission. He had history of  degenerative back disease, had surgery many years ago. The patient also  used drugs in the 76s. He has not been doing drugs for the last 30  years, presented with the above complaint. He had an abnormal MRI,  suggestive of degenerative disease and there was concern for infection. He denies any fever or chills. He does not have any night sweats or  weight loss. There was no any urinary or bowel incontinence. He was  admitted for further treatment to control his pain. He does michelle,  he has occasional pain. He denies using drugs. PAST MEDICAL HISTORY:  Includes he had a ruptured disk that required  surgery many years ago. 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 CURRENT MEDICATIONS:  Include Flexeril, Lovenox, morphine, Zofran,  oxycodone, polyethylene glycol. REVIEW OF SYSTEMS:  Noncontributory other than noted in the HPI. PHYSICAL EXAMINATION:  VITAL SIGNS:  Temperature 98.6, respirations 15, pulse 66, and blood  pressure 120/78. HEENT:  He has pink conjunctivae, anicteric sclerae. CHEST:  Bilateral air entry. CARDIOVASCULAR SYSTEM:  Regular. ABDOMEN:  Soft. EXTREMITIES:  No cyanosis or clubbing. CNS:  He is conscious. Oriented to person, place, and time. DIAGNOSTICS:  Admission ESR was only 3.   WBC 11.3, hemoglobin 15.7,  hematocrit 46.3, platelets of 523. His C-reactive protein on admission  was 2.27, repeat one was 4.38. Glucose was 103. IMPRESSION:  He is a 44-year-old male patient who was admitted with  lower back pain, radiating to his thigh, this is likely related to  degenerative disease. The patient had no risk factors at this time for  infection. Findings do not suggest abscess or phlegmonous tissue. RECOMMENDATION:  My recommendation is to continue supportive treatment,  repeat his markers of inflammation in three to four weeks depending on  findings and his clinical course to make further recommendation. At the  present time, I feel the patient can be conservatively monitored.         Estrellita Raygoza M.D.    D: 09/13/2021 12:26:12       T: 09/13/2021 13:41:39     SHAE_DEQUAN  Job#: [id number]     Doc#: [id number]    CC:

## 2021-09-13 NOTE — PROGRESS NOTES
Patient is awake, alert, and oriented x4. Patient states that pain is 6/10, RN notified. Heart sounds regular at the aortic, pulmonic, tricuspid, and mitral areas. Lung sounds clear at the anterior, posterior, and lateral areas bilaterally. Capillary refill less than 3 seconds on index fingers and great toes bilaterally. Skin turgor instantaneous bilaterally. No edema. Breathing unlabored and equal. Abdominal sounds active x4. IV site clean, dry, and intact.

## 2021-09-14 LAB
C-REACTIVE PROTEIN: 7.93 MG/DL (ref 0–1)
SARS-COV-2, NAAT: NOT DETECTED
SEDIMENTATION RATE, ERYTHROCYTE: 26 MM/HR (ref 0–10)

## 2021-09-14 PROCEDURE — 86140 C-REACTIVE PROTEIN: CPT

## 2021-09-14 PROCEDURE — 1200000000 HC SEMI PRIVATE

## 2021-09-14 PROCEDURE — 36415 COLL VENOUS BLD VENIPUNCTURE: CPT

## 2021-09-14 PROCEDURE — 87635 SARS-COV-2 COVID-19 AMP PRB: CPT

## 2021-09-14 PROCEDURE — 2580000003 HC RX 258: Performed by: PHYSICIAN ASSISTANT

## 2021-09-14 PROCEDURE — 85651 RBC SED RATE NONAUTOMATED: CPT

## 2021-09-14 PROCEDURE — 6360000002 HC RX W HCPCS: Performed by: PHYSICIAN ASSISTANT

## 2021-09-14 PROCEDURE — 6370000000 HC RX 637 (ALT 250 FOR IP): Performed by: PHYSICIAN ASSISTANT

## 2021-09-14 RX ORDER — SODIUM CHLORIDE 450 MG/100ML
INJECTION, SOLUTION INTRAVENOUS CONTINUOUS
Status: CANCELLED | OUTPATIENT
Start: 2021-09-14

## 2021-09-14 RX ORDER — MIDAZOLAM HYDROCHLORIDE 1 MG/ML
1 INJECTION INTRAMUSCULAR; INTRAVENOUS ONCE
Status: COMPLETED | OUTPATIENT
Start: 2021-09-15 | End: 2021-09-15

## 2021-09-14 RX ORDER — FENTANYL CITRATE 50 UG/ML
50 INJECTION, SOLUTION INTRAMUSCULAR; INTRAVENOUS ONCE
Status: COMPLETED | OUTPATIENT
Start: 2021-09-15 | End: 2021-09-15

## 2021-09-14 RX ADMIN — SODIUM CHLORIDE: 9 INJECTION, SOLUTION INTRAVENOUS at 18:00

## 2021-09-14 RX ADMIN — OXYCODONE 10 MG: 5 TABLET ORAL at 18:32

## 2021-09-14 RX ADMIN — CYCLOBENZAPRINE 10 MG: 10 TABLET, FILM COATED ORAL at 04:55

## 2021-09-14 RX ADMIN — OXYCODONE 10 MG: 5 TABLET ORAL at 02:36

## 2021-09-14 RX ADMIN — MORPHINE SULFATE 4 MG: 4 INJECTION, SOLUTION INTRAMUSCULAR; INTRAVENOUS at 04:55

## 2021-09-14 RX ADMIN — OXYCODONE 10 MG: 5 TABLET ORAL at 14:25

## 2021-09-14 RX ADMIN — OXYCODONE 10 MG: 5 TABLET ORAL at 23:22

## 2021-09-14 RX ADMIN — CYCLOBENZAPRINE 10 MG: 10 TABLET, FILM COATED ORAL at 23:22

## 2021-09-14 RX ADMIN — OXYCODONE 10 MG: 5 TABLET ORAL at 09:47

## 2021-09-14 ASSESSMENT — PAIN DESCRIPTION - LOCATION
LOCATION: BACK

## 2021-09-14 ASSESSMENT — PAIN SCALES - GENERAL
PAINLEVEL_OUTOF10: 4
PAINLEVEL_OUTOF10: 3
PAINLEVEL_OUTOF10: 10
PAINLEVEL_OUTOF10: 7
PAINLEVEL_OUTOF10: 9
PAINLEVEL_OUTOF10: 10
PAINLEVEL_OUTOF10: 7
PAINLEVEL_OUTOF10: 7
PAINLEVEL_OUTOF10: 4

## 2021-09-14 ASSESSMENT — PAIN DESCRIPTION - ORIENTATION
ORIENTATION: LOWER

## 2021-09-14 ASSESSMENT — PAIN DESCRIPTION - DIRECTION
RADIATING_TOWARDS: DOWN LEFT LEG
RADIATING_TOWARDS: LEFT LEG
RADIATING_TOWARDS: LEFT LEG

## 2021-09-14 ASSESSMENT — PAIN DESCRIPTION - PAIN TYPE
TYPE: ACUTE PAIN

## 2021-09-14 ASSESSMENT — PAIN DESCRIPTION - DESCRIPTORS
DESCRIPTORS: ACHING;CONSTANT
DESCRIPTORS: ACHING;NAGGING

## 2021-09-14 ASSESSMENT — PAIN DESCRIPTION - FREQUENCY
FREQUENCY: CONTINUOUS
FREQUENCY: CONTINUOUS

## 2021-09-14 ASSESSMENT — PAIN DESCRIPTION - ONSET
ONSET: ON-GOING
ONSET: ON-GOING

## 2021-09-14 ASSESSMENT — PAIN DESCRIPTION - PROGRESSION
CLINICAL_PROGRESSION: GRADUALLY IMPROVING
CLINICAL_PROGRESSION: GRADUALLY IMPROVING

## 2021-09-14 NOTE — ACP (ADVANCE CARE PLANNING)
Advance Care Planning     Advance Care Planning Inpatient Note  [facility]    Today's Date: 9/14/2021  Unit: [facility] ORTHOPEDICS 7K    Received request from IDT Member and patient. Upon review of chart and communication with care team, patient's decision making abilities are not in question. . Patient was/were present in the room during visit. Goals of ACP Conversation:  Discuss advance care planning documents    Health Care Decision Makers:     Patient has definiteively identified his significant other Lawrence Monte who resides with him at the same address as his choice for Primary Decision Maker. His verbal declaration was witnessed by his RN Zina Jackson and this [address]. Summary:  Verified Healthcare Decision Maker        Patient has definiteively identified his significant other Lawrence Monte who resides with him at the same address as his choice for Primary Decision Maker. His verbal declaration was witnessed by his RN Zina Jackson and this [address]. Advance Care Planning Documents (Patient Wishes):  None     Assessment:  Patient is alert and oriented X 4 , conversant with the nature of the HCPOA, and was a good conversation partner around clarifying the issues of completing a HCPOA. {  Interventions:  Provided education on documents for clarity and greater understanding  Discussed and provided education on state decision maker hierarchy  Reviewed but did not complete ACP document  Patient is clear about naming his significant other as his poa, but wished to delay the completion of the form until he and his isgnificant other could have a conversation about this.     Care Preferences Communicated:   No    Outcomes/Plan:  Teach Back Method used to verify the patient's and/or Healthcare Decision Maker's understanding of key information in the advance directive documents  Patient agreed that completing the actual document should be done the next day after a conversation with his significant other.      Electronically signed by Chaplain Prabhjot on 9/14/2021 at 3:44 PM

## 2021-09-14 NOTE — FLOWSHEET NOTE
[facility] PROGRESS NOTE      Patient: Sophie Vasquez  Room #: 7K-08/008-A            YOB: 1955  Age: 77 y.o. Gender: male            Admit Date & Time: 9/12/2021  7:35 AM    Assessment:   Interventions:   Outcomes:      went in reply to a Spiritual Consult regarding POA.  found patient in bed in a good of frame mind and ready for conversation.  inquired about the POA request, and patient gave a brief report around how this came about. We had around a one hour conversation around the HCPOA, also Living Will, the purpose and dynamics of the two. SEE ACP NOTE FOR DETAILS OF CONVERSATION. Plan:    1. Patient wishes to discuss this with his significant other and would prefer to complete this perhaps on Wednesday morning around 10 am to 10:30. Electronically signed by Diamond Forman on 9/14/2021 at 3:36 PM.  [address] [facility]  [phone]               09/14/21 1532   Encounter Summary   Services provided to: Patient   Referral/Consult From: Nurse;Patient   Support System Significant other;Children   Place of Orthodoxy none   Continue Visiting Yes  (09/14/2021  P)   Complexity of Encounter Low   Length of Encounter 1 hour   Spiritual Assessment Completed Yes   Routine   Type Initial   Assessment Calm; Approachable; Hopeful;Coping   Intervention Active listening;Explored feelings, thoughts, concerns;Explored coping resources;Nurtured hope   Outcome Acceptance;Comfort;Expressed gratitude;Engaged in conversation

## 2021-09-14 NOTE — PROGRESS NOTES
Progress note: Infectious diseases    Patient - Theresa Davis,  Age - 77 y.o.    - 1955      Room Number - 7K-08/008-A   MRN -  [id number]   Acct # - [de-identified]  Date of Admission -  2021  7:35 AM    SUBJECTIVE:   He has no fever or chills  Has chronic back pain  OBJECTIVE   VITALS    height is 5' 8\" (1.727 m) and weight is 147 lb 6.4 oz (66.9 kg). His oral temperature is 98.8 °F (37.1 °C). His blood pressure is 150/72 (abnormal) and his pulse is 71. His respiration is 20 and oxygen saturation is 99%.        Wt Readings from Last 3 Encounters:   21 147 lb 6.4 oz (66.9 kg)       I/O (24 Hours)    Intake/Output Summary (Last 24 hours) at 2021 0906  Last data filed at 2021 2135  Gross per 24 hour   Intake 2091 ml   Output 600 ml   Net 1491 ml       General Appearance  Awake, alert, oriented,  not  In acute distress  HEENT - normocephalic, atraumatic, pink conjunctiva,  anicteric sclera  Neck - Supple, no mass  Lungs -  Bilateral   air entry, no rhonchi, no wheeze  Cardiovascular - Heart sounds are normal.     Abdomen - soft, not distended, nontender,   Neurologic -oriented  Skin - No bruising or bleeding  Extremities - No edema, no cyanosis, clubbing     MEDICATIONS:      sodium chloride flush  5-40 mL IntraVENous 2 times per day    enoxaparin  40 mg SubCUTAneous Nightly      sodium chloride 100 mL/hr at 21 2108    sodium chloride       sodium chloride flush, sodium chloride, ondansetron **OR** ondansetron, polyethylene glycol, morphine **OR** morphine, oxyCODONE **OR** oxyCODONE, cyclobenzaprine      LABS:     CBC:   Recent Labs     21  0907 21  0539   WBC 11.3* 10.1   HGB 15.7 13.4*    212     BMP:    Recent Labs     21  0907      K 4.3      CO2 28   BUN 12   CREATININE 0.6   GLUCOSE 103     Calcium:  Recent Labs     21  0907   CALCIUM 9.7 CULTURES:   UA:   Recent Labs     09/13/21  0942   SPECGRAV 1.016   PHUR 5.5   COLORU YELLOW   PROTEINU NEGATIVE   BLOODU NEGATIVE   RBCUA 0-2   WBCUA 0-2   BACTERIA NONE SEEN   NITRU NEGATIVE   BILIRUBINUR NEGATIVE   UROBILINOGEN 1.0   KETUA NEGATIVE   LABCAST NONE SEEN  NONE SEEN     Micro:   Lab Results   Component Value Date    BC No growth-preliminary  09/12/2021            Problem list of patient:     Patient Active Problem List   Diagnosis Code    Intractable low back pain M54.5    Back pain M54.9         ASSESSMENT/PLAN   Degenerative back disease with severe pain. Mildly elevated markers of inflammation  If Ortho planning injection sooner,, will ask IR to do biopsy of the spine for gram stain and culture prior to injection of steroid.       Dao Danielson MD, MD, FACP 9/14/2021 9:06 AM

## 2021-09-14 NOTE — PROGRESS NOTES
Department of Orthopedic Surgery  Spine Service  Progress Note        Subjective:   9/14/21  Jo Ann Wang is resting in bed. Dr. Meri Aguilar evaluated yesterday, recommending conservative treatment with repeat labs in 3-4 weeks. Dr. Elio Hess does not believe he is a good candidate for surgical intervention at this time due to unknown cause of inflammatory markers. Discussed conservative management with KAIA and pain medications. Would like Dr. Meri Aguilar input if patient able to get injection or hold off due to concern for possible active infection. Vitals  VITALS:  BP (!) 150/72   Pulse 71   Temp 98.8 °F (37.1 °C) (Oral)   Resp 20   Ht 5' 8\" (1.727 m)   Wt 147 lb 6.4 oz (66.9 kg)   SpO2 99%   BMI 22.41 kg/m²   24HR INTAKE/OUTPUT:    Intake/Output Summary (Last 24 hours) at 9/14/2021 0800  Last data filed at 9/13/2021 2135  Gross per 24 hour   Intake 2491 ml   Output 600 ml   Net 1891 ml     URINARY CATHETER OUTPUT (Baer):     DRAIN/TUBE OUTPUT:     VENT SETTINGS:  Vent Information  Skin Assessment: Clean, dry, & intact  SpO2: 99 %  Additional Respiratory  Assessments  Pulse: 71  Resp: 20  SpO2: 99 %  Oral Care: Teeth brushed        PHYSICAL EXAM:    Orientation:  alert and oriented to person, place and time    Lower Extremity Motor :  quadriceps, extensor hallucis longus, dorsiflexion, plantarflexion 5/5 bilaterally  Lower Extremity Sensory:  Intact L1-S1    Flatus:  negative    ABNORMAL EXAM FINDINGS:  Mild groin pain with IR of left hip. LABS:  Recent Labs     09/13/21  0539   HGB 13.4*   HCT 39.9*       ASSESSMENT AND PLAN:    Intractable LBP, LLE radiculopathy  Concern for discitis/OM, Dr. Meri Aguilar following, recommending repeat lab work in 3-4 weeks post-op. Would like Dr. Arcadio Aguirre input if patient is ok to receive a Epidural for pain or hold off. 1:  Monitor labs; daily CBC with diff, ESR and Sed rate  2:  Activity Level:   With assistance  3:  Pain Control:  Controlled, continue  4:  Discharge Planning: Pending clinical course. 5:  Dr. Elio Hess does not believe patient is a surgical candidate at this time due to unknown inflammatory marker causes.    6:  Covid test today    Electronically signed by Sheri So PA-C on 9/14/2021 at 7:54 AM

## 2021-09-14 NOTE — PROGRESS NOTES
Pt will be scheduled for L2-3 disc biopsy at 1300 on Wednesday September 15. Pt is to be NPO 4 hours prior to procedure. Hold blood thinners. Consent for procedure will be obtained by the IR staff upon pt's arrival to the department. Pt's nurse was notified of the above and verbalized understanding.

## 2021-09-14 NOTE — CARE COORDINATION
Discharge Planning Update Note:   Dr Vinicio Ramos said not a surgical candidate at this time due to unknown inflammatory marker causes. Monitor labs; daily CBC with diff, ESR and Sed rate. Per Dr Darren Olson: If Ortho planning injection at this time, will ask IR to do biopsy of the spine for gram stain and culture prior to injection of steroid. IR scheduled 9/15/21: L2-L3 disc biopsy with Aerobic and Anaerobic with gram stain  PT/OT. N/V checks. Pain management.

## 2021-09-15 ENCOUNTER — APPOINTMENT (OUTPATIENT)
Dept: INTERVENTIONAL RADIOLOGY/VASCULAR | Age: 66
DRG: 552 | End: 2021-09-15
Payer: MEDICARE

## 2021-09-15 LAB
C-REACTIVE PROTEIN: 5.86 MG/DL (ref 0–1)
SEDIMENTATION RATE, ERYTHROCYTE: 27 MM/HR (ref 0–10)

## 2021-09-15 PROCEDURE — 62267 INTERDISCAL PERQ ASPIR DX: CPT

## 2021-09-15 PROCEDURE — 6360000002 HC RX W HCPCS: Performed by: RADIOLOGY

## 2021-09-15 PROCEDURE — 36415 COLL VENOUS BLD VENIPUNCTURE: CPT

## 2021-09-15 PROCEDURE — 86140 C-REACTIVE PROTEIN: CPT

## 2021-09-15 PROCEDURE — 87070 CULTURE OTHR SPECIMN AEROBIC: CPT

## 2021-09-15 PROCEDURE — 87075 CULTR BACTERIA EXCEPT BLOOD: CPT

## 2021-09-15 PROCEDURE — 0SB23ZX EXCISION OF LUMBAR VERTEBRAL DISC, PERCUTANEOUS APPROACH, DIAGNOSTIC: ICD-10-PCS | Performed by: RADIOLOGY

## 2021-09-15 PROCEDURE — 2709999900 IR PERC ASPIRATION INTERVERT DISC

## 2021-09-15 PROCEDURE — 1200000000 HC SEMI PRIVATE

## 2021-09-15 PROCEDURE — 77003 FLUOROGUIDE FOR SPINE INJECT: CPT

## 2021-09-15 PROCEDURE — 2580000003 HC RX 258: Performed by: PHYSICIAN ASSISTANT

## 2021-09-15 PROCEDURE — 87205 SMEAR GRAM STAIN: CPT

## 2021-09-15 PROCEDURE — 6370000000 HC RX 637 (ALT 250 FOR IP): Performed by: PHYSICIAN ASSISTANT

## 2021-09-15 PROCEDURE — 85651 RBC SED RATE NONAUTOMATED: CPT

## 2021-09-15 RX ORDER — MIDAZOLAM HYDROCHLORIDE 1 MG/ML
1 INJECTION INTRAMUSCULAR; INTRAVENOUS ONCE
Status: COMPLETED | OUTPATIENT
Start: 2021-09-15 | End: 2021-09-15

## 2021-09-15 RX ORDER — MIDAZOLAM HYDROCHLORIDE 1 MG/ML
0.5 INJECTION INTRAMUSCULAR; INTRAVENOUS ONCE
Status: COMPLETED | OUTPATIENT
Start: 2021-09-15 | End: 2021-09-15

## 2021-09-15 RX ORDER — FENTANYL CITRATE 50 UG/ML
25 INJECTION, SOLUTION INTRAMUSCULAR; INTRAVENOUS ONCE
Status: COMPLETED | OUTPATIENT
Start: 2021-09-15 | End: 2021-09-15

## 2021-09-15 RX ADMIN — MIDAZOLAM 1 MG: 1 INJECTION INTRAMUSCULAR; INTRAVENOUS at 13:37

## 2021-09-15 RX ADMIN — MIDAZOLAM 0.5 MG: 1 INJECTION INTRAMUSCULAR; INTRAVENOUS at 14:02

## 2021-09-15 RX ADMIN — OXYCODONE 10 MG: 5 TABLET ORAL at 14:59

## 2021-09-15 RX ADMIN — SODIUM CHLORIDE: 9 INJECTION, SOLUTION INTRAVENOUS at 03:56

## 2021-09-15 RX ADMIN — OXYCODONE 10 MG: 5 TABLET ORAL at 08:40

## 2021-09-15 RX ADMIN — MIDAZOLAM 0.5 MG: 1 INJECTION INTRAMUSCULAR; INTRAVENOUS at 13:55

## 2021-09-15 RX ADMIN — CYCLOBENZAPRINE 10 MG: 10 TABLET, FILM COATED ORAL at 08:40

## 2021-09-15 RX ADMIN — FENTANYL CITRATE 50 MCG: 0.05 INJECTION, SOLUTION INTRAMUSCULAR; INTRAVENOUS at 13:37

## 2021-09-15 RX ADMIN — OXYCODONE 10 MG: 5 TABLET ORAL at 23:24

## 2021-09-15 RX ADMIN — FENTANYL CITRATE 25 MCG: 0.05 INJECTION, SOLUTION INTRAMUSCULAR; INTRAVENOUS at 14:02

## 2021-09-15 RX ADMIN — OXYCODONE 10 MG: 5 TABLET ORAL at 03:56

## 2021-09-15 RX ADMIN — MIDAZOLAM 1 MG: 1 INJECTION INTRAMUSCULAR; INTRAVENOUS at 13:48

## 2021-09-15 RX ADMIN — CYCLOBENZAPRINE 10 MG: 10 TABLET, FILM COATED ORAL at 23:24

## 2021-09-15 RX ADMIN — SODIUM CHLORIDE: 9 INJECTION, SOLUTION INTRAVENOUS at 23:24

## 2021-09-15 RX ADMIN — FENTANYL CITRATE 25 MCG: 0.05 INJECTION, SOLUTION INTRAMUSCULAR; INTRAVENOUS at 13:48

## 2021-09-15 ASSESSMENT — PAIN DESCRIPTION - PROGRESSION
CLINICAL_PROGRESSION: NOT CHANGED
CLINICAL_PROGRESSION: GRADUALLY WORSENING
CLINICAL_PROGRESSION: GRADUALLY IMPROVING

## 2021-09-15 ASSESSMENT — PAIN DESCRIPTION - ONSET
ONSET: ON-GOING
ONSET: ON-GOING

## 2021-09-15 ASSESSMENT — PAIN DESCRIPTION - ORIENTATION
ORIENTATION: LOWER

## 2021-09-15 ASSESSMENT — PAIN DESCRIPTION - PAIN TYPE
TYPE: ACUTE PAIN

## 2021-09-15 ASSESSMENT — PAIN SCALES - GENERAL
PAINLEVEL_OUTOF10: 8
PAINLEVEL_OUTOF10: 4
PAINLEVEL_OUTOF10: 3
PAINLEVEL_OUTOF10: 4
PAINLEVEL_OUTOF10: 4
PAINLEVEL_OUTOF10: 7
PAINLEVEL_OUTOF10: 5
PAINLEVEL_OUTOF10: 4
PAINLEVEL_OUTOF10: 7
PAINLEVEL_OUTOF10: 4
PAINLEVEL_OUTOF10: 4
PAINLEVEL_OUTOF10: 2
PAINLEVEL_OUTOF10: 6

## 2021-09-15 ASSESSMENT — PAIN DESCRIPTION - FREQUENCY
FREQUENCY: CONTINUOUS
FREQUENCY: CONTINUOUS

## 2021-09-15 ASSESSMENT — PAIN DESCRIPTION - LOCATION
LOCATION: BACK

## 2021-09-15 ASSESSMENT — PAIN - FUNCTIONAL ASSESSMENT
PAIN_FUNCTIONAL_ASSESSMENT: PREVENTS OR INTERFERES SOME ACTIVE ACTIVITIES AND ADLS
PAIN_FUNCTIONAL_ASSESSMENT: ACTIVITIES ARE NOT PREVENTED

## 2021-09-15 ASSESSMENT — PAIN DESCRIPTION - DIRECTION
RADIATING_TOWARDS: LEFT LEG
RADIATING_TOWARDS: DOWN LEFT LEG

## 2021-09-15 ASSESSMENT — PAIN DESCRIPTION - DESCRIPTORS
DESCRIPTORS: ACHING;CONSTANT
DESCRIPTORS: ACHING

## 2021-09-15 NOTE — PROGRESS NOTES
Department of Orthopedic Surgery  Spine Service  Progress Note        Subjective:   9/14/21  Shefali Don is resting in bed. Dr. Nancy Gongora evaluated yesterday, recommending conservative treatment with repeat labs in 3-4 weeks. Dr. Nick Duncan does not believe he is a good candidate for surgical intervention at this time due to unknown cause of inflammatory markers. Discussed conservative management with KAIA and pain medications. Would like Dr. Nancy Gongora input if patient able to get injection or hold off due to concern for possible active infection. 9/15/21  Shefali Don is resting in bed. Continue LBP and LLE pain. Not a surgical candidate at this time. Planning a L2-L3 disc biopsy and culture today to r/o infection prior to LS injection. Covid test negative. NPO 4 hrs prior to procedure, hold morning Lovenox. CRP decreased to 5.86 today from 7.93 yesterday. Vitals  VITALS:  BP (!) 142/73   Pulse 69   Temp 98.5 °F (36.9 °C) (Oral)   Resp 18   Ht 5' 8\" (1.727 m)   Wt 147 lb 6.4 oz (66.9 kg)   SpO2 96%   BMI 22.41 kg/m²   24HR INTAKE/OUTPUT:      Intake/Output Summary (Last 24 hours) at 9/15/2021 8737  Last data filed at 9/14/2021 2036  Gross per 24 hour   Intake 3039 ml   Output --   Net 3039 ml     URINARY CATHETER OUTPUT (Aber):     DRAIN/TUBE OUTPUT:     VENT SETTINGS:  Vent Information  Skin Assessment: Clean, dry, & intact  SpO2: 96 %  Additional Respiratory  Assessments  Pulse: 69  Resp: 18  SpO2: 96 %  Oral Care: Teeth brushed        PHYSICAL EXAM:    Orientation:  alert and oriented to person, place and time    Lower Extremity Motor :  quadriceps, extensor hallucis longus, dorsiflexion, plantarflexion 5/5 bilaterally  Lower Extremity Sensory:  Intact L1-S1    Flatus:  positive    ABNORMAL EXAM FINDINGS:  Mild groin pain with IR of left hip.      LABS:  Recent Labs     09/13/21  0539   HGB 13.4*   HCT 39.9*       ASSESSMENT AND PLAN:    Intractable LBP, LLE radiculopathy  Concern for discitis/OM, Dr. Nancy Gongora following, recommending repeat lab work in 3-4 weeks post-op. Would like Dr. Sam Barnes input if patient is ok to receive a Epidural for pain or hold off. 1:  Monitor labs; daily CBC with diff, ESR and Sed rate  2:  Activity Level: With assistance  3:  Pain Control:  Controlled, continue  4:  Discharge Planning:  Pending clinical course. 5:  Dr. Pattie Syed does not believe patient is a surgical candidate at this time due to unknown inflammatory marker causes.    6:  Covid test negative  7:  L2-L3 disc biopsy/culture today at 1300 with IR    Electronically signed by Melo Jones PA-C on 9/15/2021 at 8:38 AM [Ankle Swelling (On Exam)] : not present [Varicose Veins Of Lower Extremities] : bilaterally [Ankle Swelling On The Right] : mild [] : not present [de-identified] : BLE soft, equal in size. Mild L calf tenderness.

## 2021-09-15 NOTE — PROGRESS NOTES
[facility]  OCCUPATIONAL THERAPY MISSED TREATMENT NOTE  [facility] ORTHOPEDICS 7K  7K-08/008-A      Date: 9/15/2021  Patient Name: Kay Krishnamurthy        CSN: [id number]   : 1955  (77 y.o.)  Gender: male   Referring Practitioner: Roby Owens PA-C  Diagnosis: Intractable low back pain         REASON FOR MISSED TREATMENT: Patient sleeping upon arrival with difficulty to alert; Will attempt next available time.

## 2021-09-15 NOTE — OP NOTE
Department of Radiology  Post Procedure Progress Note      Pre-Procedure Diagnosis:  Severe back pain    Procedure Performed:  L2-L3 disk biopsy    Anesthesia: local / versed and fentanyl    Findings: successful    Immediate Complications:  None    Estimated Blood Loss: minimal    SEE DICTATED PROCEDURE NOTE FOR COMPLETE DETAILS.     Electronically signed by Walter Gardner MD on 9/15/2021 at 2:11 PM

## 2021-09-15 NOTE — PROGRESS NOTES
1330 Patient received in IR for L2-3 disk biopsy. 1336 This procedure has been fully reviewed with the patient and written informed consent has been obtained. 1357 Procedure started with Dr. Nelly García. 1409 Procedure completed; patient tolerated well. Band aid to back; no bleeding noted. 1412 Patient on bed; comfort ensured. 1415 Report called to [facility] on [address] [place] [address].  [phone] Patient taken to [address] [place] [address] via bed.

## 2021-09-15 NOTE — H&P
[address] [address]  Sedation/Analgesia History & Physical    Pt Name: Li Fonseca  MRN: [id number]  YOB: 1955  Provider Performing Procedure: Christiana Hyatt MD, MD  Primary Care Physician: No primary care provider on file. PRE-PROCEDURE   DNR-CCA/DNR-CC []Yes [x]No  Brief History/Pre-Procedure Diagnosis: Severe back pain          MEDICAL HISTORY  []CAD/Valve  []Liver Disease  []Lung Disease []Diabetes  []Hypertension []Renal Disease  []Additional information:       has a past medical history of Ruptured disk. SURGICAL HISTORY   has a past surgical history that includes back surgery and Tonsillectomy.   Additional information:       ALLERGIES   Allergies as of 09/12/2021 - Fully Reviewed 09/12/2021   Allergen Reaction Noted    Compazine [prochlorperazine]  09/12/2021     Additional information:       MEDICATIONS   Coumadin Use Last 5 Days [x]No []Yes  Antiplatelet drug therapy use last 5 days  [x]No []Yes  Other anticoagulant use last 5 days  [x]No []Yes    Current Facility-Administered Medications:     0.9 % sodium chloride infusion, , IntraVENous, Continuous, Walda Colace, PA-C, Last Rate: 100 mL/hr at 09/15/21 0356, New Bag at 09/15/21 0356    sodium chloride flush 0.9 % injection 5-40 mL, 5-40 mL, IntraVENous, 2 times per day, Walda Colace, PA-C    sodium chloride flush 0.9 % injection 5-40 mL, 5-40 mL, IntraVENous, PRN, Walda Colace, PA-C    0.9 % sodium chloride infusion, 25 mL, IntraVENous, PRN, Walda Colace, PA-C    ondansetron (ZOFRAN-ODT) disintegrating tablet 4 mg, 4 mg, Oral, Q8H PRN **OR** ondansetron (ZOFRAN) injection 4 mg, 4 mg, IntraVENous, Q6H PRN, Walda Colace, PA-C    polyethylene glycol (GLYCOLAX) packet 17 g, 17 g, Oral, Daily PRN, Walda Colace, PA-C    [Held by provider] enoxaparin (LOVENOX) injection 40 mg, 40 mg, SubCUTAneous, Nightly, Walda Colace, PA-C    morphine (PF) injection 2 mg, 2 mg, IntraVENous, Q2H PRN **OR** morphine injection 4 mg, 4 mg, IntraVENous, Q2H PRN, Leopoldo Sleigh, PA-C, 4 mg at 09/14/21 0455    oxyCODONE (ROXICODONE) immediate release tablet 5 mg, 5 mg, Oral, Q4H PRN, 5 mg at 09/13/21 7107 **OR** oxyCODONE (ROXICODONE) immediate release tablet 10 mg, 10 mg, Oral, Q4H PRN, Leopoldo Sleigh, PA-C, 10 mg at 09/15/21 0840    cyclobenzaprine (FLEXERIL) tablet 10 mg, 10 mg, Oral, TID PRN, Leopoldo Sleigh, PA-C, 10 mg at 09/15/21 0840  Prior to Admission medications    Not on File     Additional information:       VITAL SIGNS   Vitals:    09/15/21 1405   BP: (!) 141/84   Pulse: 75   Resp: 20   Temp:    SpO2: 90%       PHYSICAL:   Heart:  [x]Regular rate and rhythm  []Other:    Lungs:  [x]Clear    []Other:    Abdomen: [x]Soft    []Other:    Mental Status: [x]Alert & Oriented  []Other:      PLANNED PROCEDURE   [x]Biospy []Arteriogram              []Drainage   []Mediport Insertion  []Fistulogram []IV access       []Vertebroplasty / Augmentation  []IVC filter []Dialysis catheter []Biliary drainage  []Other: []CAPD Catheter []Nephrostomy Tube / Stent  SEDATION  Planned agent:[x]Midazolam []Meperidine [x]Sublimaze []Dilaudid []Morphine     []Diazepam  []Other:     ASA Classification:  []1 [x]2 []3 []4 []5  Class 1: A normal healthy patient  Class 2: Pt with mild to moderate systemic disease  Class 3: Severe systemic disease or disturbance  Class 4: Severe systemic disorders that are already life threatening. Class 5: Moribund pt with little chances of survival, for more than 24 hours. Mallampati I Airway Classification:   []1 [x]2 []3 []4    [x]Pre-procedure diagnostic studies complete and results available. Comment:    [x]Previous sedation/anesthesia experiences assessed. Comment:    [x]The patient is an appropriate candidate to undergo the planned procedure sedation and anesthesia.  (Refer to nursing sedation/analgesia documentation record)  [x]Formulation and discussion of sedation/procedure plan, risks, and expectations with patient and/or responsible adult completed. [x]Patient examined immediately prior to the procedure.  (Refer to nursing sedation/analgesia documentation record)    Marlyn Soria MD, MD  Electronically signed 9/15/2021 at 2:11 PM

## 2021-09-15 NOTE — PROGRESS NOTES
Progress note: Infectious diseases    Patient - Albania Dial,  Age - 77 y.o.    - 1955      Room Number - 7K-08/008-A   MRN -  [id number]   Acct # - [de-identified]  Date of Admission -  2021  7:35 AM    SUBJECTIVE:   No new issues. OBJECTIVE   VITALS    height is 5' 8\" (1.727 m) and weight is 147 lb 6.4 oz (66.9 kg). His oral temperature is 98.5 °F (36.9 °C). His blood pressure is 137/88 and his pulse is 77. His respiration is 18 and oxygen saturation is 97%. Wt Readings from Last 3 Encounters:   21 147 lb 6.4 oz (66.9 kg)       I/O (24 Hours)    Intake/Output Summary (Last 24 hours) at 9/15/2021 1624  Last data filed at 9/15/2021 1521  Gross per 24 hour   Intake 4583.93 ml   Output --   Net 4583.93 ml       General Appearance  Awake, alert, oriented,  not  In acute distress  HEENT - normocephalic, atraumatic, pink conjunctiva,  anicteric sclera  Neck - Supple, no mass  Lungs -  Bilateral   air entry, no rhonchi, no wheeze  Cardiovascular - Heart sounds are normal.     Abdomen - soft, not distended, nontender,   Neurologic -oriented  Skin - No bruising or bleeding  Extremities - No edema, no cyanosis, clubbing     MEDICATIONS:      sodium chloride flush  5-40 mL IntraVENous 2 times per day    [Held by provider] enoxaparin  40 mg SubCUTAneous Nightly      sodium chloride 100 mL/hr at 09/15/21 0356    sodium chloride       sodium chloride flush, sodium chloride, ondansetron **OR** ondansetron, polyethylene glycol, morphine **OR** morphine, oxyCODONE **OR** oxyCODONE, cyclobenzaprine      LABS:     CBC:   Recent Labs     21  0539   WBC 10.1   HGB 13.4*        BMP:    No results for input(s): NA, K, CL, CO2, BUN, CREATININE, GLUCOSE in the last 72 hours. Calcium:  No results for input(s): CALCIUM in the last 72 hours.      CULTURES:   UA:   Recent Labs     21  0942   SPECGRAV 1.016 PHUR 5.5   COLORU YELLOW   PROTEINU NEGATIVE   BLOODU NEGATIVE   RBCUA 0-2   WBCUA 0-2   BACTERIA NONE SEEN   NITRU NEGATIVE   BILIRUBINUR NEGATIVE   UROBILINOGEN 1.0   KETUA NEGATIVE   LABCAST NONE SEEN  NONE SEEN     Micro:   Lab Results   Component Value Date    BC No growth-preliminary  09/12/2021            Problem list of patient:     Patient Active Problem List   Diagnosis Code    Intractable low back pain M54.5    Back pain M54.9         ASSESSMENT/PLAN   Degenerative back disease with severe pain:he had biopsy today. will follow result  Mildly elevated markers of inflammation     Zina Thomas MD, MD, FACP 9/15/2021 4:24 PM

## 2021-09-15 NOTE — H&P
Formulation and discussion of sedation / procedure plans, risks, benefits, side effects and alternatives with patient and/or responsible adult completed.     Electronically signed by Vandana Belcher MD on 9/15/2021 at 2:10 PM

## 2021-09-16 LAB
C-REACTIVE PROTEIN: 8.1 MG/DL (ref 0–1)
SEDIMENTATION RATE, ERYTHROCYTE: 55 MM/HR (ref 0–10)

## 2021-09-16 PROCEDURE — 97116 GAIT TRAINING THERAPY: CPT

## 2021-09-16 PROCEDURE — 6370000000 HC RX 637 (ALT 250 FOR IP): Performed by: PHYSICIAN ASSISTANT

## 2021-09-16 PROCEDURE — 36415 COLL VENOUS BLD VENIPUNCTURE: CPT

## 2021-09-16 PROCEDURE — 85651 RBC SED RATE NONAUTOMATED: CPT

## 2021-09-16 PROCEDURE — 97110 THERAPEUTIC EXERCISES: CPT

## 2021-09-16 PROCEDURE — 97530 THERAPEUTIC ACTIVITIES: CPT

## 2021-09-16 PROCEDURE — 2580000003 HC RX 258: Performed by: PHYSICIAN ASSISTANT

## 2021-09-16 PROCEDURE — 86140 C-REACTIVE PROTEIN: CPT

## 2021-09-16 PROCEDURE — 1200000000 HC SEMI PRIVATE

## 2021-09-16 RX ADMIN — OXYCODONE 10 MG: 5 TABLET ORAL at 18:14

## 2021-09-16 RX ADMIN — CYCLOBENZAPRINE 10 MG: 10 TABLET, FILM COATED ORAL at 18:14

## 2021-09-16 RX ADMIN — SODIUM CHLORIDE: 9 INJECTION, SOLUTION INTRAVENOUS at 09:24

## 2021-09-16 RX ADMIN — OXYCODONE 10 MG: 5 TABLET ORAL at 09:27

## 2021-09-16 RX ADMIN — CYCLOBENZAPRINE 10 MG: 10 TABLET, FILM COATED ORAL at 09:27

## 2021-09-16 ASSESSMENT — PAIN DESCRIPTION - PROGRESSION
CLINICAL_PROGRESSION: NOT CHANGED

## 2021-09-16 ASSESSMENT — PAIN DESCRIPTION - DESCRIPTORS: DESCRIPTORS: ACHING;DISCOMFORT

## 2021-09-16 ASSESSMENT — PAIN SCALES - GENERAL
PAINLEVEL_OUTOF10: 8
PAINLEVEL_OUTOF10: 8
PAINLEVEL_OUTOF10: 5

## 2021-09-16 ASSESSMENT — PAIN DESCRIPTION - ORIENTATION: ORIENTATION: LOWER

## 2021-09-16 ASSESSMENT — PAIN DESCRIPTION - PAIN TYPE: TYPE: ACUTE PAIN

## 2021-09-16 ASSESSMENT — PAIN DESCRIPTION - LOCATION: LOCATION: BACK

## 2021-09-16 ASSESSMENT — PAIN DESCRIPTION - ONSET: ONSET: ON-GOING

## 2021-09-16 ASSESSMENT — PAIN DESCRIPTION - FREQUENCY: FREQUENCY: CONTINUOUS

## 2021-09-16 ASSESSMENT — PAIN - FUNCTIONAL ASSESSMENT: PAIN_FUNCTIONAL_ASSESSMENT: PREVENTS OR INTERFERES SOME ACTIVE ACTIVITIES AND ADLS

## 2021-09-16 NOTE — PROGRESS NOTES
Department of Orthopedic Surgery  Spine Service  Progress Note        Subjective:   9/14/21  Lysbeth Cure is resting in bed. Dr. Sugar Reyes evaluated yesterday, recommending conservative treatment with repeat labs in 3-4 weeks. Dr. Severa Peace does not believe he is a good candidate for surgical intervention at this time due to unknown cause of inflammatory markers. Discussed conservative management with KAIA and pain medications. Would like Dr. Sugar Reyes input if patient able to get injection or hold off due to concern for possible active infection. 9/15/21  Lysbeth Cure is resting in bed. Continue LBP and LLE pain. Not a surgical candidate at this time. Planning a L2-L3 disc biopsy and culture today to r/o infection prior to LS injection. Covid test negative. NPO 4 hrs prior to procedure, hold morning Lovenox. CRP decreased to 5.86 today from 7.93 yesterday. 9/16/21  Lysbeth Cure is resting in bed. Still having difficulty with walking and severe back pain. Cultures pending form disc space biopsy yesterday. Awaiting results to determine next move; sx vs. KAIA.      Vitals  VITALS:  BP (!) 142/73   Pulse 69   Temp 98.5 °F (36.9 °C) (Oral)   Resp 18   Ht 5' 8\" (1.727 m)   Wt 147 lb 6.4 oz (66.9 kg)   SpO2 96%   BMI 22.41 kg/m²   24HR INTAKE/OUTPUT:      Intake/Output Summary (Last 24 hours) at 9/15/2021 0387  Last data filed at 9/14/2021 2036  Gross per 24 hour   Intake 3039 ml   Output --   Net 3039 ml     URINARY CATHETER OUTPUT (Baer):     DRAIN/TUBE OUTPUT:     VENT SETTINGS:  Vent Information  Skin Assessment: Clean, dry, & intact  SpO2: 96 %  Additional Respiratory  Assessments  Pulse: 69  Resp: 18  SpO2: 96 %  Oral Care: Teeth brushed        PHYSICAL EXAM:    Orientation:  alert and oriented to person, place and time    Lower Extremity Motor :  quadriceps, extensor hallucis longus, dorsiflexion, plantarflexion 5/5 bilaterally  Lower Extremity Sensory:  Intact L1-S1    Flatus:  positive    ABNORMAL EXAM FINDINGS:  Mild groin pain with IR of left hip. LABS:  Recent Labs     09/13/21  0539   HGB 13.4*   HCT 39.9*       ASSESSMENT AND PLAN:    Intractable LBP, LLE radiculopathy  Concern for discitis/OM, Dr. Tito Fields following     1:  Monitor labs; daily CBC with diff, ESR and Sed rate  2:  Activity Level: With assistance  3:  Pain Control:  Controlled, continue  4:  Discharge Planning:  Awaiting culture results  5:  ID following. Considering KAIA vs. Surgery next week pending culture results.

## 2021-09-16 NOTE — CARE COORDINATION
9/16/21, 2:30 PM EDT    DISCHARGE ON GOING [address] day: 4  Location: -08/008-A Reason for admit: Intractable low back pain [M54.5]  Back pain [M54.9]   Procedure: 9/15/21 per IR: L2-L3 disk biopsy  Barriers to Discharge: Dr Corinna French noted, \"Degenerative back disease with severe pain:biopsy negative for infection. Continue current treatment\"   PT/OT. Pain management. Epidural steroid injection being considered. PCP: Make new PCP appt with [facility]. Readmission Risk Score: 8%  Patient Goals/Plan/Treatment Preferences: Planning home with DP. Make new PCP appt with [facility].

## 2021-09-16 NOTE — PROGRESS NOTES
Progress note: Infectious diseases    Patient - Chadwick Art,  Age - 77 y.o.    - 1955      Room Number - 7K-08/008-A   MRN -  [id number]   [facility]  Date of Admission -  2021  7:35 AM    SUBJECTIVE:   No new issues. biopsy report negative, no wbc , no organisms  OBJECTIVE   VITALS    height is 5' 8\" (1.727 m) and weight is 147 lb 6.4 oz (66.9 kg). His oral temperature is 99.2 °F (37.3 °C). His blood pressure is 147/67 (abnormal) and his pulse is 76. His respiration is 18 and oxygen saturation is 95%. Wt Readings from Last 3 Encounters:   21 147 lb 6.4 oz (66.9 kg)       I/O (24 Hours)    Intake/Output Summary (Last 24 hours) at 2021 1019  Last data filed at 2021 0416  Gross per 24 hour   Intake 3870.29 ml   Output --   Net 3870.29 ml       General Appearance  Awake, alert, oriented,  not  In acute distress  HEENT - normocephalic, atraumatic, pink conjunctiva,  anicteric sclera  Neck - Supple, no mass  Lungs -  Bilateral   air entry, no rhonchi, no wheeze  Cardiovascular - Heart sounds are normal.     Abdomen - soft, not distended, nontender,   Neurologic -oriented  Skin - No bruising or bleeding  Extremities - No edema, no cyanosis, clubbing     MEDICATIONS:      sodium chloride flush  5-40 mL IntraVENous 2 times per day    [Held by provider] enoxaparin  40 mg SubCUTAneous Nightly      sodium chloride 100 mL/hr at 21 0924    sodium chloride       sodium chloride flush, sodium chloride, ondansetron **OR** ondansetron, polyethylene glycol, morphine **OR** morphine, oxyCODONE **OR** oxyCODONE, cyclobenzaprine      LABS:     CBC:   No results for input(s): WBC, HGB, PLT in the last 72 hours. BMP:    No results for input(s): NA, K, CL, CO2, BUN, CREATININE, GLUCOSE in the last 72 hours. Calcium:  No results for input(s): CALCIUM in the last 72 hours.      CULTURES:   UA:

## 2021-09-16 NOTE — PROGRESS NOTES
not recall any traumas. \"     Prior Level of Function:  Lives With: Significant other  Type of Home: House  Home Layout: Two level, Performs ADL's on one level  Home Access: Stairs to enter with rails  Entrance Stairs - Number of Steps: 2 SARAH  Entrance Stairs - Rails: Left  Home Equipment: Cane   Bathroom Shower/Tub: Tub/Shower unit  Bathroom Toilet: Standard  Bathroom Accessibility: Accessible    ADL Assistance: Independent  Homemaking Assistance: Independent  Homemaking Responsibilities: Yes  Ambulation Assistance: Independent  Transfer Assistance: Independent  Active : Yes    Restrictions/Precautions:  Restrictions/Precautions: General Precautions, Fall Risk      SUBJECTIVE: Pt resting in bed and agrees to therapy. PAIN: 2/10: at rest in bed and 8-9/10 when standing and ambulating    Vitals: Vitals not assessed per clinical judgement, see nursing flowsheet    OBJECTIVE:  Bed Mobility:  Supine to Sit: Modified Independent  Sit to Supine: Modified Independent     Transfers:  Sit to Stand: Supervision  Stand to Sit:Supervision    Ambulation:  Supervision, Stand By Assistance  Distance: 70 ft x 2 and 15 ft  Surface: Level Tile  Device:Cane  Gait Deviations: Forward Flexed Posture, Decreased Step Length Bilaterally, Decreased Gait Speed and Decreased Heel Strike Bilaterally    Balance:  Static Sitting Balance:  Supervision  Dynamic Sitting Balance: Supervision  Static Standing Balance: Stand By Assistance  Dynamic Standing Balance: Stand By Assistance    Exercise:  Patient was guided in 1 set(s) 10 reps of exercise to both lower extremities. Ankle pumps, Glut sets, Quad sets, Heelslides and Hip abduction/adduction. Exercises were completed for increased independence with functional mobility. Functional Outcome Measures: Completed  -PAC Inpatient Mobility Raw Score : 19  -PAC Inpatient T-Scale Score : 45.44    ASSESSMENT:  Assessment: Patient progressing toward established goals.   Activity Tolerance: Patient tolerance of  treatment: good. Equipment Recommendations:Equipment Needed: No  Discharge Recommendations:   Home with assist PRN    Plan: Times per week: 3-5x  Times per day: Daily  Current Treatment Recommendations: Strengthening, Neuromuscular Re-education, Balance Training, Endurance Training    Patient Education  Patient Education: Plan of Care, Home Exercise Program    Goals:  Patient goals : Patient would like to be able to return to mowing and normal every day actvities  Short term goals  Time Frame for Short term goals: Duration of stay  Short term goal 1: Patient to ambulate at least 100 feet with use of [address] [place] [address] [place] [address] in order to assist with return to home. Short term goal 2: Patient to negotiate 2 steps with handrail Indepenent in order to get into and out of home. Short term goal 3: Patient to demonstrate Fair + dynamic standing balance in order to reduce risk for falls. Long term goals  Time Frame for Long term goals : NA    Following session, patient left in safe position with all fall risk precautions in place. Luis Manuel Nicole.  [place], [place] [address]

## 2021-09-17 LAB
BASOPHILS # BLD: 0.3 %
BASOPHILS ABSOLUTE: 0 THOU/MM3 (ref 0–0.1)
C-REACTIVE PROTEIN: 9.48 MG/DL (ref 0–1)
EOSINOPHIL # BLD: 7.6 %
EOSINOPHILS ABSOLUTE: 0.6 THOU/MM3 (ref 0–0.4)
ERYTHROCYTE [DISTWIDTH] IN BLOOD BY AUTOMATED COUNT: 12.5 % (ref 11.5–14.5)
ERYTHROCYTE [DISTWIDTH] IN BLOOD BY AUTOMATED COUNT: 46.5 FL (ref 35–45)
HCT VFR BLD CALC: 38.8 % (ref 42–52)
HEMOGLOBIN: 12.7 GM/DL (ref 14–18)
IMMATURE GRANS (ABS): 0.1 THOU/MM3 (ref 0–0.07)
IMMATURE GRANULOCYTES: 1.4 %
LYMPHOCYTES # BLD: 18.5 %
LYMPHOCYTES ABSOLUTE: 1.4 THOU/MM3 (ref 1–4.8)
MCH RBC QN AUTO: 33.2 PG (ref 26–33)
MCHC RBC AUTO-ENTMCNC: 32.7 GM/DL (ref 32.2–35.5)
MCV RBC AUTO: 101.3 FL (ref 80–94)
MONOCYTES # BLD: 14.7 %
MONOCYTES ABSOLUTE: 1.1 THOU/MM3 (ref 0.4–1.3)
NUCLEATED RED BLOOD CELLS: 0 /100 WBC
PLATELET # BLD: 232 THOU/MM3 (ref 130–400)
PMV BLD AUTO: 9.7 FL (ref 9.4–12.4)
RBC # BLD: 3.83 MILL/MM3 (ref 4.7–6.1)
SEDIMENTATION RATE, ERYTHROCYTE: 46 MM/HR (ref 0–10)
SEG NEUTROPHILS: 57.5 %
SEGMENTED NEUTROPHILS ABSOLUTE COUNT: 4.3 THOU/MM3 (ref 1.8–7.7)
WBC # BLD: 7.4 THOU/MM3 (ref 4.8–10.8)

## 2021-09-17 PROCEDURE — 85651 RBC SED RATE NONAUTOMATED: CPT

## 2021-09-17 PROCEDURE — 1200000000 HC SEMI PRIVATE

## 2021-09-17 PROCEDURE — 6370000000 HC RX 637 (ALT 250 FOR IP): Performed by: PHYSICIAN ASSISTANT

## 2021-09-17 PROCEDURE — 36415 COLL VENOUS BLD VENIPUNCTURE: CPT

## 2021-09-17 PROCEDURE — 86140 C-REACTIVE PROTEIN: CPT

## 2021-09-17 PROCEDURE — 85025 COMPLETE CBC W/AUTO DIFF WBC: CPT

## 2021-09-17 RX ADMIN — OXYCODONE 5 MG: 5 TABLET ORAL at 15:33

## 2021-09-17 RX ADMIN — CYCLOBENZAPRINE 10 MG: 10 TABLET, FILM COATED ORAL at 22:43

## 2021-09-17 RX ADMIN — CYCLOBENZAPRINE 10 MG: 10 TABLET, FILM COATED ORAL at 15:33

## 2021-09-17 RX ADMIN — CYCLOBENZAPRINE 10 MG: 10 TABLET, FILM COATED ORAL at 03:36

## 2021-09-17 RX ADMIN — OXYCODONE 10 MG: 5 TABLET ORAL at 08:30

## 2021-09-17 RX ADMIN — OXYCODONE 10 MG: 5 TABLET ORAL at 03:36

## 2021-09-17 RX ADMIN — OXYCODONE 10 MG: 5 TABLET ORAL at 20:33

## 2021-09-17 ASSESSMENT — PAIN SCALES - GENERAL
PAINLEVEL_OUTOF10: 7
PAINLEVEL_OUTOF10: 5
PAINLEVEL_OUTOF10: 6
PAINLEVEL_OUTOF10: 5
PAINLEVEL_OUTOF10: 7
PAINLEVEL_OUTOF10: 8
PAINLEVEL_OUTOF10: 7

## 2021-09-17 ASSESSMENT — PAIN DESCRIPTION - ORIENTATION
ORIENTATION: LOWER
ORIENTATION: LOWER

## 2021-09-17 ASSESSMENT — PAIN DESCRIPTION - PAIN TYPE
TYPE: ACUTE PAIN;CHRONIC PAIN

## 2021-09-17 ASSESSMENT — PAIN DESCRIPTION - PROGRESSION
CLINICAL_PROGRESSION: NOT CHANGED

## 2021-09-17 ASSESSMENT — PAIN DESCRIPTION - DIRECTION: RADIATING_TOWARDS: LEFT LEG/ANKLE

## 2021-09-17 ASSESSMENT — PAIN DESCRIPTION - LOCATION
LOCATION: BACK

## 2021-09-17 ASSESSMENT — PAIN DESCRIPTION - ONSET: ONSET: ON-GOING

## 2021-09-17 ASSESSMENT — PAIN DESCRIPTION - DESCRIPTORS: DESCRIPTORS: ACHING;DISCOMFORT

## 2021-09-17 ASSESSMENT — PAIN DESCRIPTION - FREQUENCY: FREQUENCY: INTERMITTENT

## 2021-09-17 ASSESSMENT — PAIN - FUNCTIONAL ASSESSMENT: PAIN_FUNCTIONAL_ASSESSMENT: PREVENTS OR INTERFERES SOME ACTIVE ACTIVITIES AND ADLS

## 2021-09-17 NOTE — CARE COORDINATION
9/17/21, 9:37 AM EDT    Sandrosantinohaymildred 36 day: 5  Location: [facility]08/008-A Reason for admit: Intractable low back pain [M54.5]  Back pain [M54.9]   Procedure: 9/15/21 per IR: L2-L3 disk biopsy  Barriers to Discharge: Dr Idris Davis noted, \"Degenerative back disease with severe pain:biopsy negative for infection. Continue current treatment\"   PT/OT. Pain management. Epidural steroid injection being considered. Diana Falling insisting on waiting on final culture results from disc biopsy. PCP: called for new PCP appt with [facility]. Readmission Risk Score: 8%  Patient Goals/Plan/Treatment Preferences: Planning home with DP. I called Tatiana at [facility] to make PCP appt,   Phone [phone] extension [phone]. She will call me back with appt or she will call patient.

## 2021-09-17 NOTE — PROGRESS NOTES
Department of Orthopedic Surgery  Spine Service  Progress Note        Subjective:   9/14/21  Carmelo Corrigan is resting in bed. Dr. Ramone Hawkins evaluated yesterday, recommending conservative treatment with repeat labs in 3-4 weeks. Dr. Andrew Yepez does not believe he is a good candidate for surgical intervention at this time due to unknown cause of inflammatory markers. Discussed conservative management with KAIA and pain medications. Would like Dr. Ramone Hawkins input if patient able to get injection or hold off due to concern for possible active infection. 9/15/21  Carmelo Corrigan is resting in bed. Continue LBP and LLE pain. Not a surgical candidate at this time. Planning a L2-L3 disc biopsy and culture today to r/o infection prior to LS injection. Covid test negative. NPO 4 hrs prior to procedure, hold morning Lovenox. CRP decreased to 5.86 today from 7.93 yesterday. 9/16/21  Carmelo Corrigan is resting in bed. Still having difficulty with walking and severe back pain. Cultures pending form disc space biopsy yesterday. Awaiting results to determine next move; sx vs. KAIA.     9/17/21  Carmelo Corrigan is resting in bed. He is doing ok. He is very frustrated with the time this is taking and wants to go home. Discussed the importance of the evaluation for infection and plan to hopefully undergo KAIA on Monday once cleared for infection. I do think it is ok for him to go to the [place] or [employer] garden to get some fresh air and move around. He is going stir crazy being in the room. I think this will help him.      Vitals  VITALS:  BP (!) 142/73   Pulse 69   Temp 98.5 °F (36.9 °C) (Oral)   Resp 18   Ht 5' 8\" (1.727 m)   Wt 147 lb 6.4 oz (66.9 kg)   SpO2 96%   BMI 22.41 kg/m²   24HR INTAKE/OUTPUT:      Intake/Output Summary (Last 24 hours) at 9/15/2021 7087  Last data filed at 9/14/2021 2036  Gross per 24 hour   Intake 3039 ml   Output --   Net 3039 ml     URINARY CATHETER OUTPUT (Baer):     DRAIN/TUBE OUTPUT:     VENT SETTINGS:  Vent Information  Skin Assessment: Clean, dry, & intact  SpO2: 96 %  Additional Respiratory  Assessments  Pulse: 69  Resp: 18  SpO2: 96 %  Oral Care: Teeth brushed        PHYSICAL EXAM:    Orientation:  alert and oriented to person, place and time    Lower Extremity Motor :  quadriceps, extensor hallucis longus, dorsiflexion, plantarflexion 5/5 bilaterally  Lower Extremity Sensory:  Intact L1-S1    Flatus:  positive    ABNORMAL EXAM FINDINGS:  Mild groin pain with IR of left hip. LABS:  Recent Labs     09/13/21  0539   HGB 13.4*   HCT 39.9*       ASSESSMENT AND PLAN:    Intractable LBP, LLE radiculopathy  Concern for discitis/OM, Dr. Ramone Hawkins following     1:  Monitor labs; daily CBC with diff, ESR and Sed rate  2:  Activity Level: With assistance, ok to be out of room with wife in wheelchair to garden or lobby. 3:  Pain Control:  Controlled, continue  4:  Discharge Planning:  Awaiting culture results  5:  ID following. Considering KAIA vs. Surgery next week pending culture results.

## 2021-09-18 LAB
BASOPHILS # BLD: 0.4 %
BASOPHILS ABSOLUTE: 0 THOU/MM3 (ref 0–0.1)
BLOOD CULTURE, ROUTINE: NORMAL
BLOOD CULTURE, ROUTINE: NORMAL
C-REACTIVE PROTEIN: 8.39 MG/DL (ref 0–1)
EOSINOPHIL # BLD: 12.7 %
EOSINOPHILS ABSOLUTE: 0.9 THOU/MM3 (ref 0–0.4)
ERYTHROCYTE [DISTWIDTH] IN BLOOD BY AUTOMATED COUNT: 12.3 % (ref 11.5–14.5)
ERYTHROCYTE [DISTWIDTH] IN BLOOD BY AUTOMATED COUNT: 45.1 FL (ref 35–45)
HCT VFR BLD CALC: 37.9 % (ref 42–52)
HEMOGLOBIN: 12.8 GM/DL (ref 14–18)
IMMATURE GRANS (ABS): 0.03 THOU/MM3 (ref 0–0.07)
IMMATURE GRANULOCYTES: 0.4 %
LYMPHOCYTES # BLD: 20.3 %
LYMPHOCYTES ABSOLUTE: 1.4 THOU/MM3 (ref 1–4.8)
MCH RBC QN AUTO: 33.7 PG (ref 26–33)
MCHC RBC AUTO-ENTMCNC: 33.8 GM/DL (ref 32.2–35.5)
MCV RBC AUTO: 99.7 FL (ref 80–94)
MONOCYTES # BLD: 15 %
MONOCYTES ABSOLUTE: 1 THOU/MM3 (ref 0.4–1.3)
NUCLEATED RED BLOOD CELLS: 0 /100 WBC
PLATELET # BLD: 239 THOU/MM3 (ref 130–400)
PMV BLD AUTO: 9.3 FL (ref 9.4–12.4)
RBC # BLD: 3.8 MILL/MM3 (ref 4.7–6.1)
SEDIMENTATION RATE, ERYTHROCYTE: 58 MM/HR (ref 0–10)
SEG NEUTROPHILS: 51.2 %
SEGMENTED NEUTROPHILS ABSOLUTE COUNT: 3.5 THOU/MM3 (ref 1.8–7.7)
WBC # BLD: 6.9 THOU/MM3 (ref 4.8–10.8)

## 2021-09-18 PROCEDURE — 6370000000 HC RX 637 (ALT 250 FOR IP): Performed by: PHYSICIAN ASSISTANT

## 2021-09-18 PROCEDURE — 36415 COLL VENOUS BLD VENIPUNCTURE: CPT

## 2021-09-18 PROCEDURE — 85651 RBC SED RATE NONAUTOMATED: CPT

## 2021-09-18 PROCEDURE — 97110 THERAPEUTIC EXERCISES: CPT

## 2021-09-18 PROCEDURE — 85025 COMPLETE CBC W/AUTO DIFF WBC: CPT

## 2021-09-18 PROCEDURE — 1200000000 HC SEMI PRIVATE

## 2021-09-18 PROCEDURE — 97116 GAIT TRAINING THERAPY: CPT

## 2021-09-18 PROCEDURE — 86140 C-REACTIVE PROTEIN: CPT

## 2021-09-18 RX ORDER — CEFAZOLIN SODIUM 1 G/50ML
1000 INJECTION, SOLUTION INTRAVENOUS
Status: DISCONTINUED | OUTPATIENT
Start: 2021-09-18 | End: 2021-09-20 | Stop reason: HOSPADM

## 2021-09-18 RX ORDER — SODIUM CHLORIDE 450 MG/100ML
INJECTION, SOLUTION INTRAVENOUS CONTINUOUS
Status: DISCONTINUED | OUTPATIENT
Start: 2021-09-18 | End: 2021-09-20 | Stop reason: HOSPADM

## 2021-09-18 RX ADMIN — OXYCODONE 10 MG: 5 TABLET ORAL at 09:55

## 2021-09-18 RX ADMIN — OXYCODONE 10 MG: 5 TABLET ORAL at 21:43

## 2021-09-18 RX ADMIN — OXYCODONE 10 MG: 5 TABLET ORAL at 01:29

## 2021-09-18 RX ADMIN — CYCLOBENZAPRINE 10 MG: 10 TABLET, FILM COATED ORAL at 17:33

## 2021-09-18 RX ADMIN — OXYCODONE 10 MG: 5 TABLET ORAL at 17:33

## 2021-09-18 RX ADMIN — POLYETHYLENE GLYCOL 3350 17 G: 17 POWDER, FOR SOLUTION ORAL at 09:55

## 2021-09-18 ASSESSMENT — PAIN SCALES - GENERAL
PAINLEVEL_OUTOF10: 10
PAINLEVEL_OUTOF10: 10
PAINLEVEL_OUTOF10: 7
PAINLEVEL_OUTOF10: 10
PAINLEVEL_OUTOF10: 7
PAINLEVEL_OUTOF10: 7

## 2021-09-18 ASSESSMENT — PAIN DESCRIPTION - DESCRIPTORS: DESCRIPTORS: SORE

## 2021-09-18 ASSESSMENT — PAIN DESCRIPTION - PROGRESSION
CLINICAL_PROGRESSION: NOT CHANGED

## 2021-09-18 ASSESSMENT — PAIN DESCRIPTION - PAIN TYPE
TYPE: CHRONIC PAIN
TYPE: CHRONIC PAIN

## 2021-09-18 ASSESSMENT — PAIN DESCRIPTION - DIRECTION: RADIATING_TOWARDS: LEFT LEG

## 2021-09-18 ASSESSMENT — PAIN DESCRIPTION - ONSET
ONSET: ON-GOING
ONSET: ON-GOING

## 2021-09-18 ASSESSMENT — PAIN DESCRIPTION - LOCATION
LOCATION: BACK;WRIST
LOCATION: BACK

## 2021-09-18 ASSESSMENT — PAIN DESCRIPTION - FREQUENCY
FREQUENCY: INTERMITTENT
FREQUENCY: CONTINUOUS

## 2021-09-18 ASSESSMENT — PAIN DESCRIPTION - ORIENTATION
ORIENTATION: MID
ORIENTATION: MID;LOWER

## 2021-09-18 NOTE — PROGRESS NOTES
Department of Orthopedic Surgery  Spine Service  Progress Note        Subjective:   9/14/21  Brianna Guevara is resting in bed. Dr. Drea Miller evaluated yesterday, recommending conservative treatment with repeat labs in 3-4 weeks. Dr. Ilia Greene does not believe he is a good candidate for surgical intervention at this time due to unknown cause of inflammatory markers. Discussed conservative management with KAIA and pain medications. Would like Dr. Drea Miller input if patient able to get injection or hold off due to concern for possible active infection. 9/15/21  Brianna Guevara is resting in bed. Continue LBP and LLE pain. Not a surgical candidate at this time. Planning a L2-L3 disc biopsy and culture today to r/o infection prior to LS injection. Covid test negative. NPO 4 hrs prior to procedure, hold morning Lovenox. CRP decreased to 5.86 today from 7.93 yesterday. 9/16/21  Brianna Guevara is resting in bed. Still having difficulty with walking and severe back pain. Cultures pending form disc space biopsy yesterday. Awaiting results to determine next move; sx vs. KAIA.     9/17/21  Brianna Guevara is resting in bed. He is doing ok. He is very frustrated with the time this is taking and wants to go home. Discussed the importance of the evaluation for infection and plan to hopefully undergo KAIA on Monday once cleared for infection. I do think it is ok for him to go to the [employer] or [email or web address] garden to get some fresh air and move around. He is going stir crazy being in the room. I think this will help him. 9/18/21  Brianna Guevara is resting in bed. He is doing well this AM. No change in cultures. Inflammatory markers mildly elevated. WBC normal. Planning [facility] Monday.      Vitals  VITALS:  BP (!) 142/73   Pulse 69   Temp 98.5 °F (36.9 °C) (Oral)   Resp 18   Ht 5' 8\" (1.727 m)   Wt 147 lb 6.4 oz (66.9 kg)   SpO2 96%   BMI 22.41 kg/m²   24HR INTAKE/OUTPUT:      Intake/Output Summary (Last 24 hours) at 9/15/2021 0838  Last data filed at 9/14/2021 2036  Gross per 24 hour Intake 3039 ml   Output --   Net 3039 ml     URINARY CATHETER OUTPUT (Baer):     DRAIN/TUBE OUTPUT:     VENT SETTINGS:  Vent Information  Skin Assessment: Clean, dry, & intact  SpO2: 96 %  Additional Respiratory  Assessments  Pulse: 69  Resp: 18  SpO2: 96 %  Oral Care: Teeth brushed        PHYSICAL EXAM:    Orientation:  alert and oriented to person, place and time    Lower Extremity Motor :  quadriceps, extensor hallucis longus, dorsiflexion, plantarflexion 5/5 bilaterally  Lower Extremity Sensory:  Intact L1-S1    Flatus:  positive    ABNORMAL EXAM FINDINGS:  Mild groin pain with IR of left hip. LABS:  Recent Labs     09/13/21  0539   HGB 13.4*   HCT 39.9*       ASSESSMENT AND PLAN:    Intractable LBP, LLE radiculopathy  Concern for discitis/OM, Dr. Ramsey List following     1:  Monitor labs; daily CBC with diff, ESR and Sed rate  2:  Activity Level: With assistance, ok to be out of room with wife in wheelchair to garden or lobby. 3:  Pain Control:  Controlled, continue  4:  Discharge Planning:  Awaiting culture results  5:  ID following. Considering KAIA vs. Surgery next week pending culture results.

## 2021-09-18 NOTE — PROGRESS NOTES
[address] [place] [address]  INPATIENT PHYSICAL THERAPY  DAILY NOTE  [facility] ORTHOPEDICS 7K - 7K-08/008-A    Time In: 0465  Time Out: 1337  Timed Code Treatment Minutes: 44 Minutes  Minutes: 39          Date: 2021  Patient Name: Sophie Vasquez,  Gender:  male        MRN: [id number]  : 1955  (77 y.o.)     Referring Practitioner: Almaz Mathur PA-C  Diagnosis: Intractable low back pain  Additional Pertinent Hx: Per EMR, \"Esdras Marie is a 77 y.o. male who presents to the emergency department for evaluation of back pain. Patient states that he woke up Thursday morning with severe lower back pain on the left side radiating down his thigh into his knee. Patient says the pain is accompanied with occasional numbness. Patient states the pain is 7 out of 10 intensity. Patient states the pain is worse with lying flat and better with sitting in chair. Patient says that he try topical lidocaine patches and ibuprofen with no relief. Patient says that prior to the start of the pain he was having a normal day. Patient was lying michelle the day before. 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 Patient states that he came in today because the pain becoming unbearable and he has been unable to sleep. Patient says that he was diagnosed with a ruptured disc in S4 requiring surgery about 35 years ago. 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 Patient does endorse a pack a day smoking habit as well as about 3 beers per night. Patient says he is never had withdrawals or problems related to alcohol before. Patient does endorse concurrent recreational marijuana use. Patient does deny any other recreational drugs. Patient is not experiencing any bladder or bowel incontinence or saddle anesthesia.   Patient does not recall any traumas. \"     Prior Level of Function:  Lives With: Significant other  Type of Home: House  Home Layout: Two level, Performs ADL's on one level  Home Access: Stairs to enter with rails  Entrance Stairs - Number of Steps: 2 SARAH  Entrance Stairs - Rails: Left  Home Equipment: Cane   Bathroom Shower/Tub: Tub/Shower unit  Bathroom Toilet: Standard  Bathroom Accessibility: Accessible    ADL Assistance: Independent  Homemaking Assistance: Independent  Homemaking Responsibilities: Yes  Ambulation Assistance: Independent  Transfer Assistance: Independent  Active : Yes    Restrictions/Precautions:  Restrictions/Precautions: General Precautions, Fall Risk     SUBJECTIVE: RN approved therapy session. Patient supine in bed upon arrival. Patient pleasant and agreeable to therapy. PAIN:  Patient notes having pain in L hip, L ankle and R hand but did not rate at this time. Vitals: Vitals not assessed per clinical judgement, see nursing flowsheet    OBJECTIVE:  Bed Mobility:  Supine to Sit: Modified Independent  Sit to Supine: Modified Independent     Transfers:  Sit to Stand: Supervision  Stand to Sit:Supervision    Ambulation:  Supervision, with increased time for completion  Distance: 70'x2, 50'x2  Surface: Level Tile  Device:Cane  Gait Deviations: Forward Flexed Posture, Decreased Step Length Bilaterally, Decreased Gait Speed and Decreased Heel Strike Bilaterally. Patient needed multiple seated rest breaks due to increased fatigue in rhonda LE. Stairs:  Stairs:  6\" steps. X 4 using one handrail and cane and Contact Guard Assistance, with increased time for completion, Patient performed recipricol while ascending and step to gait pattern while descending. .    Exercise:  Patient was guided in 1 set(s) 20 reps of exercise to both lower extremities. Heelslides, Hip abduction/adduction, Straight leg raises, Bridges, Seated marches, Seated heel/toe raises, Long arc quads and Seated abduction/adduction. Exercises were completed for increased independence with functional mobility. Functional Outcome Measures: Completed  AM-PAC Inpatient Mobility Raw Score : 20  AM-PAC Inpatient T-Scale Score : 47.67    ASSESSMENT:  Assessment: Patient progressing toward established goals. Activity Tolerance:  Patient tolerance of  treatment: good. Patient limited by decreased strength and decreased endurance. Equipment Recommendations:Equipment Needed: No  Discharge Recommendations:  Home with assist PRN    Plan: Times per week: 3-5x  Times per day: Daily  Current Treatment Recommendations: Strengthening, Neuromuscular Re-education, Balance Training, Endurance Training    Patient Education  Patient Education: Plan of Care, Gait, Stairs, Verbal Exercise Instruction    Goals:  Patient goals : Patient would like to be able to return to mowing and normal every day actvities  Short term goals  Time Frame for Short term goals: Duration of stay  Short term goal 1: Patient to ambulate at least 100 feet with use of [address] in order to assist with return to home. Short term goal 2: Patient to negotiate 2 steps with handrail Indepenent in order to get into and out of home. Short term goal 3: Patient to demonstrate Fair + dynamic standing balance in order to reduce risk for falls. Long term goals  Time Frame for Long term goals : NA    Following session, patient left in safe position with all fall risk precautions in place.

## 2021-09-18 NOTE — PROGRESS NOTES
Pt back to room with wife. Noted cigarette smoke smell. When questioning pt, admits to smoking. Advised we are a tobacco free campus and he can not smoke. If continues to smoke, we will not be able to let him leave the unit.  Pt voices understanding at this time

## 2021-09-19 LAB
BASOPHILS # BLD: 0.4 %
BASOPHILS ABSOLUTE: 0 THOU/MM3 (ref 0–0.1)
C-REACTIVE PROTEIN: 7.46 MG/DL (ref 0–1)
EOSINOPHIL # BLD: 12.3 %
EOSINOPHILS ABSOLUTE: 0.9 THOU/MM3 (ref 0–0.4)
ERYTHROCYTE [DISTWIDTH] IN BLOOD BY AUTOMATED COUNT: 12.2 % (ref 11.5–14.5)
ERYTHROCYTE [DISTWIDTH] IN BLOOD BY AUTOMATED COUNT: 44.8 FL (ref 35–45)
HCT VFR BLD CALC: 40.7 % (ref 42–52)
HEMOGLOBIN: 13.5 GM/DL (ref 14–18)
IMMATURE GRANS (ABS): 0.02 THOU/MM3 (ref 0–0.07)
IMMATURE GRANULOCYTES: 0.3 %
INR BLD: 1.18 (ref 0.85–1.13)
LYMPHOCYTES # BLD: 19 %
LYMPHOCYTES ABSOLUTE: 1.4 THOU/MM3 (ref 1–4.8)
MCH RBC QN AUTO: 33 PG (ref 26–33)
MCHC RBC AUTO-ENTMCNC: 33.2 GM/DL (ref 32.2–35.5)
MCV RBC AUTO: 99.5 FL (ref 80–94)
MONOCYTES # BLD: 14.3 %
MONOCYTES ABSOLUTE: 1 THOU/MM3 (ref 0.4–1.3)
NUCLEATED RED BLOOD CELLS: 0 /100 WBC
PLATELET # BLD: 253 THOU/MM3 (ref 130–400)
PMV BLD AUTO: 9.3 FL (ref 9.4–12.4)
RBC # BLD: 4.09 MILL/MM3 (ref 4.7–6.1)
SEDIMENTATION RATE, ERYTHROCYTE: 43 MM/HR (ref 0–10)
SEG NEUTROPHILS: 53.7 %
SEGMENTED NEUTROPHILS ABSOLUTE COUNT: 3.9 THOU/MM3 (ref 1.8–7.7)
WBC # BLD: 7.2 THOU/MM3 (ref 4.8–10.8)

## 2021-09-19 PROCEDURE — 1200000000 HC SEMI PRIVATE

## 2021-09-19 PROCEDURE — 36415 COLL VENOUS BLD VENIPUNCTURE: CPT

## 2021-09-19 PROCEDURE — 85610 PROTHROMBIN TIME: CPT

## 2021-09-19 PROCEDURE — 6370000000 HC RX 637 (ALT 250 FOR IP): Performed by: PHYSICIAN ASSISTANT

## 2021-09-19 PROCEDURE — 85025 COMPLETE CBC W/AUTO DIFF WBC: CPT

## 2021-09-19 PROCEDURE — 85651 RBC SED RATE NONAUTOMATED: CPT

## 2021-09-19 PROCEDURE — 86140 C-REACTIVE PROTEIN: CPT

## 2021-09-19 RX ADMIN — OXYCODONE 10 MG: 5 TABLET ORAL at 04:50

## 2021-09-19 RX ADMIN — OXYCODONE 5 MG: 5 TABLET ORAL at 09:42

## 2021-09-19 RX ADMIN — POLYETHYLENE GLYCOL 3350 17 G: 17 POWDER, FOR SOLUTION ORAL at 09:43

## 2021-09-19 RX ADMIN — CYCLOBENZAPRINE 10 MG: 10 TABLET, FILM COATED ORAL at 16:06

## 2021-09-19 RX ADMIN — OXYCODONE 10 MG: 5 TABLET ORAL at 21:09

## 2021-09-19 RX ADMIN — OXYCODONE 5 MG: 5 TABLET ORAL at 16:06

## 2021-09-19 ASSESSMENT — PAIN DESCRIPTION - ONSET
ONSET: ON-GOING

## 2021-09-19 ASSESSMENT — PAIN DESCRIPTION - FREQUENCY
FREQUENCY: CONTINUOUS

## 2021-09-19 ASSESSMENT — PAIN DESCRIPTION - ORIENTATION
ORIENTATION: RIGHT
ORIENTATION: MID
ORIENTATION: MID

## 2021-09-19 ASSESSMENT — PAIN SCALES - GENERAL
PAINLEVEL_OUTOF10: 4
PAINLEVEL_OUTOF10: 9
PAINLEVEL_OUTOF10: 6
PAINLEVEL_OUTOF10: 7
PAINLEVEL_OUTOF10: 6

## 2021-09-19 ASSESSMENT — PAIN DESCRIPTION - PAIN TYPE
TYPE: CHRONIC PAIN
TYPE: ACUTE PAIN
TYPE: CHRONIC PAIN

## 2021-09-19 ASSESSMENT — PAIN DESCRIPTION - LOCATION
LOCATION: BACK
LOCATION: BACK;WRIST
LOCATION: BACK

## 2021-09-19 ASSESSMENT — PAIN DESCRIPTION - DESCRIPTORS
DESCRIPTORS: SORE
DESCRIPTORS: SORE

## 2021-09-19 ASSESSMENT — PAIN DESCRIPTION - PROGRESSION
CLINICAL_PROGRESSION: NOT CHANGED

## 2021-09-19 ASSESSMENT — PAIN - FUNCTIONAL ASSESSMENT
PAIN_FUNCTIONAL_ASSESSMENT: PREVENTS OR INTERFERES SOME ACTIVE ACTIVITIES AND ADLS

## 2021-09-19 NOTE — PROGRESS NOTES
Progress note: Infectious diseases    Patient - Tita Truong,  Age - 77 y.o.    - 1955      Room Number - 7K-08/008-A   MRN -  [id number]   Acct # - [de-identified]  Date of Admission -  2021  7:35 AM    SUBJECTIVE:   He has no new complaints. Culture so far negative. OBJECTIVE   VITALS    height is 5' 8\" (1.727 m) and weight is 147 lb 6.4 oz (66.9 kg). His oral temperature is 98.3 °F (36.8 °C). His blood pressure is 123/78 and his pulse is 73. His respiration is 18 and oxygen saturation is 97%. Wt Readings from Last 3 Encounters:   21 147 lb 6.4 oz (66.9 kg)       I/O (24 Hours)    Intake/Output Summary (Last 24 hours) at 2021 1140  Last data filed at 2021 0454  Gross per 24 hour   Intake 550 ml   Output --   Net 550 ml       General Appearance  Awake, alert, oriented,  not  In acute distress  HEENT - normocephalic, atraumatic, pink conjunctiva,  anicteric sclera  Neck - Supple, no mass.   Lungs -  Bilateral   air entry, no rhonchi, no wheeze  Cardiovascular - Heart sounds are normal.     Abdomen - soft, not distended, nontender,   Neurologic -oriented  Skin - No bruising or bleeding  Extremities - No edema, no cyanosis, clubbing     MEDICATIONS:      ceFAZolin  1,000 mg IntraVENous 60 Min Pre-Op    sodium chloride flush  5-40 mL IntraVENous 2 times per day    [Held by provider] enoxaparin  40 mg SubCUTAneous Nightly      sodium chloride      sodium chloride Stopped (21 1551)    sodium chloride       sodium chloride flush, sodium chloride, ondansetron **OR** ondansetron, polyethylene glycol, morphine **OR** morphine, oxyCODONE **OR** oxyCODONE, cyclobenzaprine      LABS:     CBC:   Recent Labs     21  0741 21  0612 21  0646   WBC 7.4 6.9 7.2   HGB 12.7* 12.8* 13.5*    239 253     BMP:    No results for input(s): NA, K, CL, CO2, BUN, CREATININE, GLUCOSE in the last 72 hours. Calcium:  No results for input(s): CALCIUM in the last 72 hours. Problem list of patient:     Patient Active Problem List   Diagnosis Code    Intractable low back pain M54.5    Back pain M54.9         ASSESSMENT/PLAN   Degenerative back disease with severe pain:biopsy negative for infection  Ok with steroid injection  ID will see him as needed.    Ajay Townsend MD, MD, FACP 9/19/2021 11:40 AM

## 2021-09-19 NOTE — PROGRESS NOTES
Department of Orthopedic Surgery  Spine Service  Progress Note        Subjective:   9/14/21  Lianne Velez is resting in bed. Dr. Esteban Kohler evaluated yesterday, recommending conservative treatment with repeat labs in 3-4 weeks. Dr. Haritha Loyd does not believe he is a good candidate for surgical intervention at this time due to unknown cause of inflammatory markers. Discussed conservative management with TIFFANIE and pain medications. Would like Dr. Esteban Kohler input if patient able to get injection or hold off due to concern for possible active infection. 9/15/21  Lianne Velez is resting in bed. Continue LBP and LLE pain. Not a surgical candidate at this time. Planning a L2-L3 disc biopsy and culture today to r/o infection prior to LS injection. Covid test negative. NPO 4 hrs prior to procedure, hold morning Lovenox. CRP decreased to 5.86 today from 7.93 yesterday. 9/16/21  Lianne Velez is resting in bed. Still having difficulty with walking and severe back pain. Cultures pending form disc space biopsy yesterday. Awaiting results to determine next move; sx vs. TIFFANIE.     9/17/21  Lianne Velez is resting in bed. He is doing ok. He is very frustrated with the time this is taking and wants to go home. Discussed the importance of the evaluation for infection and plan to hopefully undergo TIFFANIE on Monday once cleared for infection. I do think it is ok for him to go to the [employer] or [employer] garden to get some fresh air and move around. He is going stir crazy being in the room. I think this will help him. 9/18/21  Lianne Velez is resting in bed. He is doing well this AM. No change in cultures. Inflammatory markers mildly elevated. WBC normal. Planning [facility] Monday. 9/19/21  Lianne Velez is resting in bed. Pain controlled. Inflammatory markers trending downward. Negative cultures. Plan tiffanie tomorrow with IR.      Vitals  VITALS:  BP (!) 142/73   Pulse 69   Temp 98.5 °F (36.9 °C) (Oral)   Resp 18   Ht 5' 8\" (1.727 m)   Wt 147 lb 6.4 oz (66.9 kg)   SpO2 96%   BMI 22.41 kg/m²   24HR INTAKE/OUTPUT:      Intake/Output Summary (Last 24 hours) at 9/15/2021 1275  Last data filed at 9/14/2021 2036  Gross per 24 hour   Intake 3039 ml   Output --   Net 3039 ml     URINARY CATHETER OUTPUT (Baer):     DRAIN/TUBE OUTPUT:     VENT SETTINGS:  Vent Information  Skin Assessment: Clean, dry, & intact  SpO2: 96 %  Additional Respiratory  Assessments  Pulse: 69  Resp: 18  SpO2: 96 %  Oral Care: Teeth brushed        PHYSICAL EXAM:    Orientation:  alert and oriented to person, place and time    Lower Extremity Motor :  quadriceps, extensor hallucis longus, dorsiflexion, plantarflexion 5/5 bilaterally  Lower Extremity Sensory:  Intact L1-S1    Flatus:  positive    ABNORMAL EXAM FINDINGS:  Mild groin pain with IR of left hip. LABS:  Recent Labs     09/13/21  0539   HGB 13.4*   HCT 39.9*       ASSESSMENT AND PLAN:    Intractable LBP, LLE radiculopathy  Concern for discitis/OM, Dr. Briana Boykin following     1:  Monitor labs; daily CBC with diff, ESR and Sed rate  2:  Activity Level: With assistance, ok to be out of room with wife in wheelchair to garden or lobby. 3:  Pain Control:  Controlled, continue  4:  Discharge Planning:  Planning tomorrow after KAIA.   5:  Plan KAIA with IR tomorrow.

## 2021-09-20 ENCOUNTER — APPOINTMENT (OUTPATIENT)
Dept: INTERVENTIONAL RADIOLOGY/VASCULAR | Age: 66
DRG: 552 | End: 2021-09-20
Payer: MEDICARE

## 2021-09-20 VITALS
HEART RATE: 75 BPM | DIASTOLIC BLOOD PRESSURE: 53 MMHG | SYSTOLIC BLOOD PRESSURE: 130 MMHG | TEMPERATURE: 98.3 F | OXYGEN SATURATION: 97 % | HEIGHT: 68 IN | RESPIRATION RATE: 16 BRPM | WEIGHT: 147.4 LBS | BODY MASS INDEX: 22.34 KG/M2

## 2021-09-20 LAB
AEROBIC CULTURE: NORMAL
ANAEROBIC CULTURE: NORMAL
BASOPHILS # BLD: 0.5 %
BASOPHILS ABSOLUTE: 0 THOU/MM3 (ref 0–0.1)
C-REACTIVE PROTEIN: 6.08 MG/DL (ref 0–1)
EOSINOPHIL # BLD: 12.6 %
EOSINOPHILS ABSOLUTE: 0.9 THOU/MM3 (ref 0–0.4)
ERYTHROCYTE [DISTWIDTH] IN BLOOD BY AUTOMATED COUNT: 12.2 % (ref 11.5–14.5)
ERYTHROCYTE [DISTWIDTH] IN BLOOD BY AUTOMATED COUNT: 44.3 FL (ref 35–45)
GRAM STAIN RESULT: NORMAL
HCT VFR BLD CALC: 40.9 % (ref 42–52)
HEMOGLOBIN: 13.9 GM/DL (ref 14–18)
IMMATURE GRANS (ABS): 0.02 THOU/MM3 (ref 0–0.07)
IMMATURE GRANULOCYTES: 0.3 %
LYMPHOCYTES # BLD: 19.5 %
LYMPHOCYTES ABSOLUTE: 1.4 THOU/MM3 (ref 1–4.8)
MCH RBC QN AUTO: 33.3 PG (ref 26–33)
MCHC RBC AUTO-ENTMCNC: 34 GM/DL (ref 32.2–35.5)
MCV RBC AUTO: 98.1 FL (ref 80–94)
MONOCYTES # BLD: 14 %
MONOCYTES ABSOLUTE: 1 THOU/MM3 (ref 0.4–1.3)
NUCLEATED RED BLOOD CELLS: 0 /100 WBC
PLATELET # BLD: 288 THOU/MM3 (ref 130–400)
PMV BLD AUTO: 9.4 FL (ref 9.4–12.4)
RBC # BLD: 4.17 MILL/MM3 (ref 4.7–6.1)
SEDIMENTATION RATE, ERYTHROCYTE: 57 MM/HR (ref 0–10)
SEG NEUTROPHILS: 53.1 %
SEGMENTED NEUTROPHILS ABSOLUTE COUNT: 3.9 THOU/MM3 (ref 1.8–7.7)
WBC # BLD: 7.4 THOU/MM3 (ref 4.8–10.8)

## 2021-09-20 PROCEDURE — 2500000003 HC RX 250 WO HCPCS: Performed by: RADIOLOGY

## 2021-09-20 PROCEDURE — 97110 THERAPEUTIC EXERCISES: CPT

## 2021-09-20 PROCEDURE — 36415 COLL VENOUS BLD VENIPUNCTURE: CPT

## 2021-09-20 PROCEDURE — 3E0R3BZ INTRODUCTION OF ANESTHETIC AGENT INTO SPINAL CANAL, PERCUTANEOUS APPROACH: ICD-10-PCS | Performed by: RADIOLOGY

## 2021-09-20 PROCEDURE — 3E0R33Z INTRODUCTION OF ANTI-INFLAMMATORY INTO SPINAL CANAL, PERCUTANEOUS APPROACH: ICD-10-PCS | Performed by: RADIOLOGY

## 2021-09-20 PROCEDURE — 97535 SELF CARE MNGMENT TRAINING: CPT

## 2021-09-20 PROCEDURE — 86140 C-REACTIVE PROTEIN: CPT

## 2021-09-20 PROCEDURE — 85651 RBC SED RATE NONAUTOMATED: CPT

## 2021-09-20 PROCEDURE — 62323 NJX INTERLAMINAR LMBR/SAC: CPT | Performed by: RADIOLOGY

## 2021-09-20 PROCEDURE — 6360000004 HC RX CONTRAST MEDICATION: Performed by: RADIOLOGY

## 2021-09-20 PROCEDURE — 6370000000 HC RX 637 (ALT 250 FOR IP): Performed by: PHYSICIAN ASSISTANT

## 2021-09-20 PROCEDURE — 6360000002 HC RX W HCPCS: Performed by: RADIOLOGY

## 2021-09-20 PROCEDURE — 85025 COMPLETE CBC W/AUTO DIFF WBC: CPT

## 2021-09-20 PROCEDURE — 2709999900 IR FLUORO GUIDED LUMBAR PUNCTURE THERAPY

## 2021-09-20 RX ORDER — DEXAMETHASONE SODIUM PHOSPHATE 4 MG/ML
4 INJECTION, SOLUTION INTRA-ARTICULAR; INTRALESIONAL; INTRAMUSCULAR; INTRAVENOUS; SOFT TISSUE ONCE
Status: COMPLETED | OUTPATIENT
Start: 2021-09-20 | End: 2021-09-20

## 2021-09-20 RX ORDER — HYDROCODONE BITARTRATE AND ACETAMINOPHEN 5; 325 MG/1; MG/1
1 TABLET ORAL EVERY 4 HOURS PRN
Qty: 42 TABLET | Refills: 0 | Status: SHIPPED | OUTPATIENT
Start: 2021-09-20 | End: 2021-09-27

## 2021-09-20 RX ORDER — CYCLOBENZAPRINE HCL 10 MG
10 TABLET ORAL 3 TIMES DAILY PRN
Qty: 50 TABLET | Refills: 0 | Status: SHIPPED | OUTPATIENT
Start: 2021-09-20 | End: 2021-09-30

## 2021-09-20 RX ORDER — BUPIVACAINE HYDROCHLORIDE 2.5 MG/ML
4 INJECTION, SOLUTION EPIDURAL; INFILTRATION; INTRACAUDAL ONCE
Status: COMPLETED | OUTPATIENT
Start: 2021-09-20 | End: 2021-09-20

## 2021-09-20 RX ADMIN — BUPIVACAINE HYDROCHLORIDE 2 ML: 2.5 INJECTION, SOLUTION EPIDURAL; INFILTRATION; INTRACAUDAL; PERINEURAL at 09:49

## 2021-09-20 RX ADMIN — OXYCODONE 5 MG: 5 TABLET ORAL at 08:46

## 2021-09-20 RX ADMIN — POLYETHYLENE GLYCOL 3350 17 G: 17 POWDER, FOR SOLUTION ORAL at 08:48

## 2021-09-20 RX ADMIN — DEXAMETHASONE SODIUM PHOSPHATE 4 MG: 4 INJECTION, SOLUTION INTRAMUSCULAR; INTRAVENOUS at 09:49

## 2021-09-20 RX ADMIN — IOHEXOL 2 ML: 180 INJECTION INTRAVENOUS at 09:49

## 2021-09-20 ASSESSMENT — PAIN DESCRIPTION - LOCATION: LOCATION: BACK

## 2021-09-20 ASSESSMENT — PAIN DESCRIPTION - ORIENTATION: ORIENTATION: LEFT;LOWER

## 2021-09-20 ASSESSMENT — PAIN SCALES - GENERAL
PAINLEVEL_OUTOF10: 5
PAINLEVEL_OUTOF10: 2
PAINLEVEL_OUTOF10: 5

## 2021-09-20 ASSESSMENT — PAIN DESCRIPTION - FREQUENCY: FREQUENCY: CONTINUOUS

## 2021-09-20 ASSESSMENT — PAIN DESCRIPTION - PAIN TYPE: TYPE: ACUTE PAIN

## 2021-09-20 ASSESSMENT — PAIN DESCRIPTION - ONSET: ONSET: ON-GOING

## 2021-09-20 ASSESSMENT — PAIN - FUNCTIONAL ASSESSMENT: PAIN_FUNCTIONAL_ASSESSMENT: PREVENTS OR INTERFERES SOME ACTIVE ACTIVITIES AND ADLS

## 2021-09-20 ASSESSMENT — PAIN DESCRIPTION - PROGRESSION
CLINICAL_PROGRESSION: NOT CHANGED

## 2021-09-20 ASSESSMENT — PAIN DESCRIPTION - DIRECTION: RADIATING_TOWARDS: LEFT LEG

## 2021-09-20 ASSESSMENT — PAIN DESCRIPTION - DESCRIPTORS: DESCRIPTORS: SORE

## 2021-09-20 NOTE — PROGRESS NOTES
69   Temp 98.5 °F (36.9 °C) (Oral)   Resp 18   Ht 5' 8\" (1.727 m)   Wt 147 lb 6.4 oz (66.9 kg)   SpO2 96%   BMI 22.41 kg/m²   24HR INTAKE/OUTPUT:      Intake/Output Summary (Last 24 hours) at 9/15/2021 8375  Last data filed at 9/14/2021 2036  Gross per 24 hour   Intake 3039 ml   Output --   Net 3039 ml     URINARY CATHETER OUTPUT (Baer):     DRAIN/TUBE OUTPUT:     VENT SETTINGS:  Vent Information  Skin Assessment: Clean, dry, & intact  SpO2: 96 %  Additional Respiratory  Assessments  Pulse: 69  Resp: 18  SpO2: 96 %  Oral Care: Teeth brushed        PHYSICAL EXAM:    Orientation:  alert and oriented to person, place and time    Lower Extremity Motor :  quadriceps, extensor hallucis longus, dorsiflexion, plantarflexion 5/5 bilaterally  Lower Extremity Sensory:  Intact L1-S1    Flatus:  positive    ABNORMAL EXAM FINDINGS:  Mild groin pain with IR of left hip. LABS:  Recent Labs     09/13/21  0539   HGB 13.4*   HCT 39.9*       ASSESSMENT AND PLAN:    Intractable LBP, LLE radiculopathy  Concern for discitis/OM, Dr. Zarina Johnson following     1:  Monitor labs; daily CBC with diff, ESR and Sed rate  2:  Activity Level: With assistance, ok to be out of room with wife in wheelchair to garden or lobby.    3:  Pain Control:  Controlled, continue  4:  Discharge Planning:  Planning tomorrow after KAIA.   5:  Plan KAIA with IR today  6: NPO after breakfast

## 2021-09-20 NOTE — PROGRESS NOTES
3438 Patient received in IR for Lumbar epidural.   4887 This procedure has been fully reviewed with the patient and written informed consent has been obtained. 0382 Procedure started with Dr. Sabas Wilson Procedure completed; patient tolerated well. Band aid to back; no bleeding noted. 3056 Patient on bed; comfort ensured. Pedal push and pull strong and equal.   7869 Patient taken to [address] [place] [address] via bed.

## 2021-09-20 NOTE — CARE COORDINATION
9/20/21, 10:48 AM EDT    Patient goals/plan/ treatment preferences discussed by  and . Patient goals/plan/ treatment preferences reviewed with patient/ family. Patient/ family verbalize understanding of discharge plan and are in agreement with goal/plan/treatment preferences. Understanding was demonstrated using the teach back method. AVS provided by RN at time of discharge, which includes all necessary medical information pertaining to the patients current course of illness, treatment, post-discharge goals of care, and treatment preferences. IMM Letter  IMM Letter given to Patient/Family/Significant other/Guardian/POA/by[de-identified]   IMM Letter date given[de-identified] 09/20/21  IMM Letter time given[de-identified] 8229     Planning home today.  KAIA complete  New PCP appt made and reviewed with patient Pended new generic blood glucose supply orders. LOV- 10/16 says diabetes f/u in 6 months with fasting labs.   Sarah Mix RN

## 2021-09-20 NOTE — PROGRESS NOTES
Returned from IR per bed. Alert, oriented. Hand grasp, pedal push and pull equal and moderate. Pain level in back \"0-2\". Denies any numbness/tingling. Bandaid on lower back dry and intact.

## 2021-09-20 NOTE — PROGRESS NOTES
[facility]  Occupational Therapy  Daily Note  Time:   Time In: 9695  Time Out: 3433  Timed Code Treatment Minutes: 24 Minutes  Minutes: 24          Date: 2021  Patient Name: Koko Greco,   Gender: male      Room: [facility]-A  MRN: [id number]  : 1955  (77 y.o.)  Referring Practitioner: Abby Kennedy PA-C  Diagnosis: Intractable low back pain  Additional Pertinent Hx: Per EMR, \"Esdras Marie is a 77 y.o. male who presents to the emergency department for evaluation of back pain. Patient states that he woke up Thursday morning with severe lower back pain on the left side radiating down his thigh into his knee. Patient says the pain is accompanied with occasional numbness. Patient states the pain is 7 out of 10 intensity. Patient states the pain is worse with lying flat and better with sitting in chair. Patient says that he try topical lidocaine patches and ibuprofen with no relief. Patient says that prior to the start of the pain he was having a normal day. Patient was lying michelle the day before. 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 Patient states that he came in today because the pain becoming unbearable and he has been unable to sleep. Patient says that he was diagnosed with a ruptured disc in S4 requiring surgery about 35 years ago. 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 Patient does endorse a pack a day smoking habit as well as about 3 beers per night. Patient says he is never had withdrawals or problems related to alcohol before. Patient does endorse concurrent recreational marijuana use. Patient does deny any other recreational drugs. Patient is not experiencing any bladder or bowel incontinence or saddle anesthesia.   Patient does Education & Training, Self-Care / ADL, Home Management Training, Patient/Caregiver Education & Training, Equipment Evaluation, Education, & procurement, Balance Training    Patient Education  Patient Education: ADL's, Home Exercise Program, Importance of Increasing Activity, Assistive Device Safety and Safety with transfers and mobility    Goals  Short term goals  Time Frame for Short term goals: Until discharge  Short term goal 1: pt will complete BUE light resistive exercises with min vcs for technique to increase indep and endurance with all self cares and return to working. Short term goal 2: Pt will complete standing tolerance x 4 minutes with 2 UE release and S to increase indep with sinkside grooming. Short term goal 3: Pt will complete LB dressing with LHAE PRN and S to increase indep and endurance with self care routine. Short term goal 4: Pt will complete functional mobility to/from BR and HH distances with S and 0 vcs for safety to increase indep and endurance with all self cares. Following session, patient left in safe position with all fall risk precautions in place.

## 2021-09-20 NOTE — H&P
Formulation and discussion of sedation / procedure plans, risks, benefits, side effects and alternatives with patient and/or responsible adult completed.     Electronically signed by Eliot Deutsch MD on 9/20/2021 at 9:50 AM

## 2021-09-20 NOTE — PROGRESS NOTES
Discharge teaching and instructions for diagnosis/procedure of back pain/epidural completed with patient using teachback method. AVS reviewed. Printed prescriptions (norco, flexeril) given to patient. Patient voiced understanding regarding prescriptions, follow up appointments, and care of self at home.  Discharged in a wheelchair to  home with support per family

## 2021-09-20 NOTE — OP NOTE
Department of Radiology  Post Procedure Progress Note    Pre-Procedure Diagnosis:  Lumbar radiculopathy     Procedure Performed:  Epidural block/steroid injection      Anesthesia: local     Findings: successful    Immediate Complications:  None    Estimated Blood Loss: minimal    SEE DICTATED PROCEDURE NOTE FOR COMPLETE DETAILS.       Genoveva Kaiser MD   9/20/2021 9:50 AM

## 2021-09-21 ENCOUNTER — CARE COORDINATION (OUTPATIENT)
Dept: CASE MANAGEMENT | Age: 66
End: 2021-09-21

## 2021-09-21 NOTE — CARE COORDINATION
[facility] Transitions Initial Follow Up Call    Call within 2 business days of discharge: Yes    Patient: Sudarshan Lord Patient : 1955   MRN: [id number]  Reason for Admission: back pain  Discharge Date: 21 RARS: Readmission Risk Score: 11      Last Discharge [address] [address]       Complaint Diagnosis Description Type Department Provider    21 Back Pain Acute left-sided low back pain with left-sided sciatica . .. ED to Hosp-Admission (Discharged) (ADMITTED) GAIL Dunlap MD; Myrtle Dorsey . .. Spoke with: Molly, pt.'s significant other, she said Andrew Fitzgerald is getting along very well. He is ambulating with a cane without pain and is taking it easy. His pain level is 0/10 today. He has not needed the Norco or Flexeril. He is urinating and moving his bowels normally. Denies n/v/d/fever. Eating and drinking fluids very well. Reviewed hospital f/u appts. with her. No further concerns voiced at this time. Has CTN # if a concern arises.  Ap Saleem RN Care Transitions [phone]    Non-face-to-face services provided:  Scheduled appointment with PCP-10/4/21  Scheduled appointment with Specialist-10/12/21    Care Transitions 24 Hour Call    Do you have any ongoing symptoms?: No  Do you have a copy of your discharge instructions?: Yes  Do you have all of your prescriptions and are they filled?: Yes  Have you been contacted by a [facility] Pharmacist?: No  Have you scheduled your follow up appointment?: Yes  How are you going to get to your appointment?: Car - family or friend to transport  Were you discharged with any Home Care or Post Acute Services: No  Do you feel like you have everything you need to keep you well at home?: Yes  Care Transitions Interventions  No Identified Needs     Transitions of Care Initial Call        Challenges to be reviewed by the provider   Additional needs identified to be addressed with provider: No  none             Method of communication with provider : none      Advance Care Planning:   Does patient have an Advance Directive: not on file  Was this a readmission? No  Patient stated reason for admission: back pain  Patients top risk factors for readmission: functional physical ability, lack of knowledge about disease and medical condition-back pain lumbar radiculopathy    Care Transition Nurse (CTN) contacted the family by telephone to perform post hospital discharge assessment. Verified name and  with family as identifiers. Provided introduction to self, and explanation of the CTN role. CTN reviewed discharge instructions, medical action plan and red flags with family who verbalized understanding. Family given an opportunity to ask questions and does not have any further questions or concerns at this time. Were discharge instructions available to patient? Yes. Reviewed appropriate site of care based on symptoms and resources available to patient including: PCP and Specialist. The family agrees to contact the PCP office for questions related to their healthcare. Medication reconciliation was performed with family, who verbalizes understanding of administration of home medications. Advised obtaining a 90-day supply of all daily and as-needed medications. Covid Risk Education     Educated patient about risk for severe COVID-19 due to risk factors according to CDC guidelines. CTN reviewed discharge instructions, medical action plan and red flag symptoms with the family who verbalized understanding. Discussed COVID vaccination status: No. Education provided on COVID-19 vaccination as appropriate. Discussed exposure protocols and quarantine with CDC Guidelines. Family was given an opportunity to verbalize any questions and concerns and agrees to contact CTN or health care provider for questions related to their healthcare. Reviewed and educated family on any new and changed medications related to discharge diagnosis.      Was patient discharged with a pulse oximeter? No Discussed and confirmed pulse oximeter discharge instructions and when to notify provider or seek emergency care. CTN provided contact information. Plan for follow-up call in 5-7 days based on severity of symptoms and risk factors. Plan for next call: symptom management-back pain and follow up appointment-PCP/OIO        Follow Up  No future appointments.     Pavan Woodson RN

## 2021-09-27 NOTE — DISCHARGE SUMMARY
[address] [place] [address]                               DISCHARGE SUMMARY    PATIENT NAME: Guillermo Atkinson                 :        1955  MED REC NO:   [id number]                           ROOM:       0008  ACCOUNT NO:   [de-identified]                           ADMIT DATE: 2021  PROVIDER:     Umm Adair PA-C                    [address] [place] [address] DATE: 2021    DISCHARGE DIAGNOSES:  Intractable back pain with severe degenerative  disk disease and spinal stenosis with radiculopathy. Treatment performed on the date of 2021 was a L3-L4 lumbar  epidural steroid injection. HOSPITAL COURSE:  The patient is a 66-year-old gentleman who presented  to the emergency department on the date of 2021 with complaints of  significant low back pain and left lower extremity pain with a duration  of three days. He presented to the emergency department with these  complaints and was ultimately worked up with MRI scans with and without  contrast of the cervical, thoracic and lumbar spine. There were  findings of significant degenerative change with a fluid level in the  disk space at the L2-L3 and L3-L4 levels, and he admitted to a history  of intravenous drug use and endorsed a previous lumbar spine surgery  roughly 35 years ago/. With these concerning findings possible for  diskitis versus osteomyelitis, the patient was admitted. He was  admitted to the hospital on Dr. Molina uDmas service and ultimately we did  make a consultation to Dr. Kelly Ramos on his first day of admission. We  were trending his inflammatory markers which were mild to moderately  elevated and continued to progress upward. With these findings of  increased markers of inflammation, we had Interventional Radiology do a  biopsy of the disk space for Gram stain and culture.   Ultimately, this  was done on 09/15/2021 and the patient continued to be monitored for  signs of infection as we awaited the culture result. Ultimately, the  cultures did result negative through the weekend. With the negative  finding, we felt more confident with the ability to move forward with  epidural injection, as the risk of infection was minimal at that point. The patient was able to continue working with therapy throughout his  stay, making short frequent walks around the room and in and out of the  bathroom, but still struggle quite a bit with pain. He did grow  restless with his lengthened stay and ultimately once he had his  injection on 09/20/2021, he was ready to discharge home. At that point,  he did describe his pain level in his back as 0 to 2 with no change in  his lower extremity strength. Ultimately, he was discharged home with  medications including Norco and Flexeril. DISCHARGE INSTRUCTIONS:  Include no heavy lifting, bending or twisting. We will continue to evaluate him as an outpatient as a possible surgical  candidate if this epidural injection does not continually provides  relief. DISCHARGE DISPOSITION:  Home. DISCHARGE CONDITION:  Stable.         Kp Bess    D: 09/26/2021 11:32:46       T: 09/26/2021 11:35:49     [id number]  Job#: [id number]     Doc#: [id number]    CC:  Sarai Shanks CNP

## 2021-09-28 ENCOUNTER — CARE COORDINATION (OUTPATIENT)
Dept: CASE MANAGEMENT | Age: 66
End: 2021-09-28

## 2021-09-28 NOTE — CARE COORDINATION
[facility] Transitions Follow Up Call    2021    Patient: Judson Lancaster  Patient : 1955   MRN: [id number]  Reason for Admission: back pain  Discharge Date: 21 RARS: Readmission Risk Score: 11         Spoke with: Karlinick Thurstonruthie today and he the back pain has improved but his L hip is hurting. He said this is not new and he takes the Norco/Flexeril at night so he can sleep. He has been up and around and feels stronger. Denies n/v/d/fever. He is eating and drinking fluids very well and denies problems urinating or moving his bowels. If the hip pain worsens he will notiiy Dr. Jessica Bishop office. Care Transitions Subsequent and Final Call    Subsequent and Final Calls  Do you have any ongoing symptoms?: Yes  Onset of Patient-reported symptoms: Other  Patient-reported symptoms: Other  Interventions for patient-reported symptoms: Other  Have your medications changed?: No  Do you have any questions related to your medications?: No  Do you currently have any active services?: No  Do you have any needs or concerns that I can assist you with?: No  Identified Barriers: Lack of Education  Care Transitions Interventions  No Identified Needs  Other Interventions:         Care Transitions Follow Up Call    Needs to be reviewed by the provider   Additional needs identified to be addressed with provider: No  none             Method of communication with provider : none      Care Transition Nurse (CTN) contacted the patient by telephone to follow up after admission on 21. Verified name and  with patient as identifiers. Addressed changes since last contact: none  Discussed follow-up appointments. If no appointment was previously scheduled, appointment scheduling offered: Yes. Is follow up appointment scheduled within 7 days of discharge? Yes. Advance Care Planning:   Does patient have an Advance Directive: not on file.      CTN reviewed discharge instructions, medical action plan and red flags with patient and discussed any barriers to care and/or understanding of plan of care after discharge. Discussed appropriate site of care based on symptoms and resources available to patient including: Specialist. The patient agrees to contact the PCP office for questions related to their healthcare. Patients top risk factors for readmission: functional physical ability, lack of knowledge about disease and medical condition-back pain  Interventions to address risk factors: Scheduled appointment with PCPElizabeth 10/4/21 and Scheduled appointment with Julio C Hanover        CTN provided contact information for future needs. Plan for follow-up call in 7-10 days based on severity of symptoms and risk factors. Plan for next call: symptom management-back/L hip pain        Follow Up  No future appointments.     Jessica Melgar RN

## 2021-10-05 ENCOUNTER — CARE COORDINATION (OUTPATIENT)
Dept: CASE MANAGEMENT | Age: 66
End: 2021-10-05

## 2021-10-05 NOTE — CARE COORDINATION
[facility] Transitions Follow Up Call    10/5/2021    Patient: Abel Rossi  Patient : 1955   MRN: [id number]  Reason for Admission: 21  Discharge Date: 21 RARS: Readmission Risk Score: 11         Spoke with: Janet Robin today and he is doing pretty good. The back pain is only occasional and a 1/10. He is having L hip pain but that's been going on for a long time. He is not taking the Norco and Flexeril as he said they really don't help with anything. He is mobile. Denies n/v/d/fever/sob. He saw his PCP yesterday and no changes were made. He has [facility]. At [facility]. Is eating and drinking very well and denies problems with urination/bowels. No other concerns voiced at this time. Care Transitions Subsequent and Final Call    Subsequent and Final Calls  Do you have any ongoing symptoms?: No  Have your medications changed?: No  Do you have any questions related to your medications?: No  Do you currently have any active services?: No  Do you have any needs or concerns that I can assist you with?: No  Identified Barriers: Lack of Education  Care Transitions Interventions  No Identified Needs  Other Interventions:       Care Transitions Follow Up Call    Needs to be reviewed by the provider   Additional needs identified to be addressed with provider: No  none             Method of communication with provider : none      Care Transition Nurse (CTN) contacted the patient by telephone to follow up after admission on 21. Verified name and  with patient as identifiers. Addressed changes since last contact: none  Discussed follow-up appointments. If no appointment was previously scheduled, appointment scheduling offered: Yes. Is follow up appointment scheduled within 7 days of discharge? Yes. Advance Care Planning:   Does patient have an Advance Directive: not on file.      CTN reviewed discharge instructions, medical action plan and red flags with patient and discussed any barriers to care and/or understanding of plan of care after discharge. Discussed appropriate site of care based on symptoms and resources available to patient including: Specialist. The patient agrees to contact the PCP office for questions related to their healthcare. Patients top risk factors for readmission: lack of knowledge about disease and medical condition-Back pain w/ epidural block 9/20/21  Interventions to address risk factors: Scheduled appointment with Specialist-Gary 10/12/21        CTN provided contact information for future needs. Plan for follow-up call in 7-10 days based on severity of symptoms and risk factors. Plan for next call: symptom management-back pain, L hip pain          Follow Up  No future appointments.     Remedios Jones RN

## 2021-10-13 ENCOUNTER — CARE COORDINATION (OUTPATIENT)
Dept: CASE MANAGEMENT | Age: 66
End: 2021-10-13

## 2021-10-13 NOTE — CARE COORDINATION
[facility] Transitions Follow Up Call    10/13/2021    Patient: Bishop Gonzalez  Patient : 1955   MRN: <[id number]>  Reason for Admission:   Discharge Date: 21 RARS: Readmission Risk Score: 11    Attempted to contact patient for transition follow up. Unable to reach patient. Left message with contact information and request for call back. Follow Up  No future appointments.     Pa Davis RN

## 2021-10-20 ENCOUNTER — CARE COORDINATION (OUTPATIENT)
Dept: CASE MANAGEMENT | Age: 66
End: 2021-10-20

## 2025-05-02 ENCOUNTER — APPOINTMENT (OUTPATIENT)
Dept: CT IMAGING | Age: 70
End: 2025-05-02
Payer: MEDICARE

## 2025-05-02 ENCOUNTER — HOSPITAL ENCOUNTER (EMERGENCY)
Age: 70
Discharge: HOME | End: 2025-05-02
Attending: EMERGENCY MEDICINE
Payer: MEDICARE

## 2025-05-02 VITALS
WEIGHT: 145 LBS | TEMPERATURE: 97.5 F | RESPIRATION RATE: 18 BRPM | BODY MASS INDEX: 21.98 KG/M2 | OXYGEN SATURATION: 96 % | HEART RATE: 96 BPM | DIASTOLIC BLOOD PRESSURE: 88 MMHG | SYSTOLIC BLOOD PRESSURE: 145 MMHG | HEIGHT: 68 IN

## 2025-05-02 DIAGNOSIS — L03.213 PRESEPTAL CELLULITIS OF LEFT EYE: Primary | ICD-10-CM

## 2025-05-02 DIAGNOSIS — H10.9 BACTERIAL CONJUNCTIVITIS: ICD-10-CM

## 2025-05-02 LAB
ANION GAP SERPL CALC-SCNC: 10 MEQ/L (ref 8–16)
BASOPHILS ABSOLUTE: 0 THOU/MM3 (ref 0–0.1)
BASOPHILS NFR BLD AUTO: 0.2 %
BUN SERPL-MCNC: 15 MG/DL (ref 8–23)
CALCIUM SERPL-MCNC: 9.5 MG/DL (ref 8.8–10.2)
CHLORIDE SERPL-SCNC: 104 MEQ/L (ref 98–111)
CO2 SERPL-SCNC: 25 MEQ/L (ref 22–29)
CREAT SERPL-MCNC: 0.7 MG/DL (ref 0.7–1.2)
DEPRECATED RDW RBC AUTO: 46.7 FL (ref 35–45)
EOSINOPHIL NFR BLD AUTO: 1.6 %
EOSINOPHILS ABSOLUTE: 0.2 THOU/MM3 (ref 0–0.4)
ERYTHROCYTE [DISTWIDTH] IN BLOOD BY AUTOMATED COUNT: 13.6 % (ref 11.5–14.5)
GFR SERPL CREATININE-BSD FRML MDRD: > 90 ML/MIN/1.73M2
GLUCOSE SERPL-MCNC: 104 MG/DL (ref 74–109)
HCT VFR BLD AUTO: 40.2 % (ref 42–52)
HGB BLD-MCNC: 13.5 GM/DL (ref 14–18)
IMM GRANULOCYTES # BLD AUTO: 0.02 THOU/MM3 (ref 0–0.07)
IMM GRANULOCYTES NFR BLD AUTO: 0.2 %
LACTATE SERPL-SCNC: 0.7 MMOL/L (ref 0.5–2)
LYMPHOCYTES ABSOLUTE: 1.2 THOU/MM3 (ref 1–4.8)
LYMPHOCYTES NFR BLD AUTO: 11.6 %
MCH RBC QN AUTO: 31 PG (ref 26–33)
MCHC RBC AUTO-ENTMCNC: 33.6 GM/DL (ref 32.2–35.5)
MCV RBC AUTO: 92.4 FL (ref 80–94)
MONOCYTES ABSOLUTE: 0.9 THOU/MM3 (ref 0.4–1.3)
MONOCYTES NFR BLD AUTO: 9 %
NEUTROPHILS ABSOLUTE: 7.7 THOU/MM3 (ref 1.8–7.7)
NEUTROPHILS NFR BLD AUTO: 77.4 %
NRBC BLD AUTO-RTO: 0 /100 WBC
OSMOLALITY SERPL CALC.SUM OF ELEC: 278.7 MOSMOL/KG (ref 275–300)
PLATELET # BLD AUTO: 269 THOU/MM3 (ref 130–400)
PMV BLD AUTO: 9.6 FL (ref 9.4–12.4)
POTASSIUM SERPL-SCNC: 3.8 MEQ/L (ref 3.5–5.2)
PROCALCITONIN SERPL IA-MCNC: 0.06 NG/ML (ref 0.01–0.09)
RBC # BLD AUTO: 4.35 MILL/MM3 (ref 4.7–6.1)
SODIUM SERPL-SCNC: 139 MEQ/L (ref 135–145)
WBC # BLD AUTO: 10 THOU/MM3 (ref 4.8–10.8)

## 2025-05-02 PROCEDURE — 80048 BASIC METABOLIC PNL TOTAL CA: CPT

## 2025-05-02 PROCEDURE — 83605 ASSAY OF LACTIC ACID: CPT

## 2025-05-02 PROCEDURE — 96374 THER/PROPH/DIAG INJ IV PUSH: CPT

## 2025-05-02 PROCEDURE — 36415 COLL VENOUS BLD VENIPUNCTURE: CPT

## 2025-05-02 PROCEDURE — 6360000004 HC RX CONTRAST MEDICATION: Performed by: NURSE PRACTITIONER

## 2025-05-02 PROCEDURE — 6360000002 HC RX W HCPCS: Performed by: NURSE PRACTITIONER

## 2025-05-02 PROCEDURE — 70481 CT ORBIT/EAR/FOSSA W/DYE: CPT

## 2025-05-02 PROCEDURE — 84145 PROCALCITONIN (PCT): CPT

## 2025-05-02 PROCEDURE — 99285 EMERGENCY DEPT VISIT HI MDM: CPT

## 2025-05-02 PROCEDURE — 6370000000 HC RX 637 (ALT 250 FOR IP): Performed by: NURSE PRACTITIONER

## 2025-05-02 PROCEDURE — 85025 COMPLETE CBC W/AUTO DIFF WBC: CPT

## 2025-05-02 RX ORDER — IOPAMIDOL 755 MG/ML
80 INJECTION, SOLUTION INTRAVASCULAR
Status: COMPLETED | OUTPATIENT
Start: 2025-05-02 | End: 2025-05-02

## 2025-05-02 RX ORDER — POLYMYXIN B SULFATE AND TRIMETHOPRIM 1; 10000 MG/ML; [USP'U]/ML
1 SOLUTION OPHTHALMIC ONCE
Status: COMPLETED | OUTPATIENT
Start: 2025-05-02 | End: 2025-05-02

## 2025-05-02 RX ORDER — KETOROLAC TROMETHAMINE 30 MG/ML
15 INJECTION, SOLUTION INTRAMUSCULAR; INTRAVENOUS ONCE
Status: COMPLETED | OUTPATIENT
Start: 2025-05-02 | End: 2025-05-02

## 2025-05-02 RX ORDER — ETODOLAC 300 MG/1
300 CAPSULE ORAL EVERY 8 HOURS
Qty: 30 CAPSULE | Refills: 0 | Status: SHIPPED | OUTPATIENT
Start: 2025-05-02

## 2025-05-02 RX ADMIN — IOPAMIDOL 80 ML: 755 INJECTION, SOLUTION INTRAVENOUS at 03:18

## 2025-05-02 RX ADMIN — KETOROLAC TROMETHAMINE 15 MG: 30 INJECTION, SOLUTION INTRAMUSCULAR at 03:47

## 2025-05-02 RX ADMIN — POLYMYXIN B SULFATE AND TRIMETHOPRIM SULFATE 1 DROP: 10000; 1 SOLUTION/ DROPS OPHTHALMIC at 04:50

## 2025-05-02 ASSESSMENT — VISUAL ACUITY
OD: 20/70
OU: 20/70

## 2025-05-02 ASSESSMENT — PAIN DESCRIPTION - LOCATION: LOCATION: EYE

## 2025-05-02 ASSESSMENT — PAIN SCALES - GENERAL: PAINLEVEL_OUTOF10: 4

## 2025-05-02 ASSESSMENT — PAIN DESCRIPTION - DESCRIPTORS: DESCRIPTORS: BURNING;JABBING

## 2025-05-02 ASSESSMENT — PAIN DESCRIPTION - ORIENTATION: ORIENTATION: LEFT

## 2025-05-02 NOTE — ED PROVIDER NOTES
left preseptal cellulitis. The left extraconal and    intraconal structures are intact without evidence of infection.    Significant enhancement and thickening of the left conjunctiva likely    representing conjunctivitis. Tiny left preseptal conjunctival rim    enhancing fluid collection abutting the anterolateral aspect of the globe    which is best identified on axial image 22 of series 601 and sagittal    image 45 of series 603.      This document has been electronically signed by: Alfonso Hitchcock DO on    05/02/2025 04:02 AM      All CTs at this facility use dose modulation techniques and iterative    reconstructions, and/or weight-based dosing   when appropriate to reduce radiation to a low as reasonably achievable.          FINAL IMPRESSION AND DISPOSITION      1. Preseptal cellulitis of left eye    2. Bacterial conjunctivitis        DISPOSITION Decision To Discharge 05/02/2025 04:28:11 AM   DISPOSITION CONDITION Stable     PATIENT REFERRED TO:  [facility]  [address] [place] [address]  Phone: [phone]  Call today  For follow up      DISCHARGE MEDICATIONS:  New Prescriptions    AMOXICILLIN-CLAVULANATE (AUGMENTIN) 875-125 MG PER TABLET    Take 1 tablet by mouth 2 times daily for 10 days       (Please note that portions of this note were completed with a voice recognition program.  Efforts were made to edit the dictations but occasionally words aremis-transcribed.)    MD Maria Fernanda Howell Jian, MD  05/02/25 5245    
diagnostic testing was performed and results reviewed with the patient.         The results of pertinent diagnostic studies and exam findings were discussed. The patient’s provisional diagnosis and plan of care were discussed with the patient and present family who expressed understanding and agreement with the POC. Any medications were reviewed and indications and risks of medications were discussed with the patient /family present. Strict verbal and written return precautions, instructions and appropriate follow-up provided to  the patient.   Patient was DISCHARGED from the hospital. Based on the reassuring ED workup and patient's stable vital signs, I feel the patient may be safely discharged home. At this point in time, I believe the patient has the mental capacity to make medical decisions.      No notes of EC Admission Criteria type on file.        Patient was seen independently by myself. The patient's final impression and disposition and plan was determined by myself.     Strict return precautions and follow up instructions were discussed with the patient prior to discharge, with which the patient agrees.    Physical assessment findings, diagnostic testing(s) if applicable, and vital signs reviewed with patient/patient representative.  Questions answered.   Medications asdirected, including OTC medications for supportive care.   Education provided on medications.  Differential diagnosis(s) discussed with patient/patient representative.  Home care/self care instructions reviewed withpatient/patient representative.  Patient is to follow-up with family care provider in 2-3 days if no improvement.  Patient is to go to the emergency department if symptoms worsen.  Patient/patient representative isaware of care plan, questions answered, verbalizes understanding and is in agreement.     ED Medications administered this visit:  (None if blank)  Medications   iopamidol (ISOVUE-370) 76 % injection 80 mL (80 mLs

## 2025-05-02 NOTE — ED NOTES
Pt presents to ED with L eye swelling, pain, redness, and drainage x4 days. Pt states this sort of thing has happened before on the R eye and they were able to get the swelling down with stye eye drops, but the eye drops did not work for this eye. Pt also states that he has new nasal congestion r/t the swelling of his L eye. Pt took ibuprofen for pain PTA and states his current pain level is a 4/10. Pt resting in ED chair. Call light within reach. Denies further needs.

## 2025-05-02 NOTE — DISCHARGE INSTRUCTIONS
?? Discharge Instructions: Preseptal Cellulitis with Bacterial Conjunctivitis  Diagnosis: Preseptal cellulitis and bacterial conjunctivitis (infection of the eyelid and surrounding tissues, along with eye infection)    ?? Medications:  Oral Antibiotics  Take the prescribed antibiotic exactly as directed, even if symptoms start to improve.  Common antibiotics include:  Amoxicillin-clavulanate (Augmentin)  Clindamycin (if MRSA is suspected)  Cephalexin (Keflex) for mild cases  Antibiotic Eye Drops or Ointment  Apply as prescribed (e.g., erythromycin ointment, polymyxin/trimethoprim, or moxifloxacin drops)  You have been given polymyxin/trimethoprim--one drop every four hours for 7 days  Wash hands before and after touching the eyes  Do not share drops between eyes unless instructed    ?? Supportive Care:  Apply warm compresses to the affected eye area 3-4 times per day to reduce swelling and discomfort  Keep the area clean and dry  Avoid contact lenses and eye makeup until fully healed  Gently wipe away any eye discharge with a clean, damp cloth    ? Return to the ER or Call Your Doctor If:  Eye pain worsens  Redness or swelling increases  Fever develops or persists  Vision changes, such as blurry vision or double vision  Proptosis (eye bulging) or pain with eye movement  No improvement within 48-72 hours of starting antibiotics    ???? Follow-Up:  Follow up with your primary care provider or eye doctor in 2-3 days  Sooner follow-up may be needed if symptoms worsen or don't improve    ?? Prevent the Spread (for Conjunctivitis):  Wash hands frequently  Avoid touching or rubbing the eyes  Do not share towels, pillows, or washcloths  Keep your child home from /school until eye discharge stops

## 2025-06-20 ENCOUNTER — HOSPITAL ENCOUNTER (INPATIENT)
Age: 70
LOS: 2 days | Discharge: HOME | DRG: 152 | End: 2025-06-24
Admitting: INTERNAL MEDICINE
Payer: MEDICARE

## 2025-06-20 ENCOUNTER — APPOINTMENT (OUTPATIENT)
Dept: CT IMAGING | Age: 70
DRG: 152 | End: 2025-06-20
Payer: MEDICARE

## 2025-06-20 ENCOUNTER — APPOINTMENT (OUTPATIENT)
Dept: GENERAL RADIOLOGY | Age: 70
DRG: 152 | End: 2025-06-20
Payer: MEDICARE

## 2025-06-20 DIAGNOSIS — J02.0 STREPTOCOCCAL SORE THROAT: ICD-10-CM

## 2025-06-20 DIAGNOSIS — R91.8 LUNG MASS: Primary | ICD-10-CM

## 2025-06-20 DIAGNOSIS — Z72.0 TOBACCO USE: ICD-10-CM

## 2025-06-20 DIAGNOSIS — R04.2 HEMOPTYSIS: ICD-10-CM

## 2025-06-20 DIAGNOSIS — R59.0 MEDIASTINAL LYMPHADENOPATHY: ICD-10-CM

## 2025-06-20 DIAGNOSIS — J04.30 SUPRAGLOTTITIS WITHOUT AIRWAY OBSTRUCTION: ICD-10-CM

## 2025-06-20 DIAGNOSIS — J02.9 SORE THROAT: ICD-10-CM

## 2025-06-20 LAB
ANION GAP SERPL CALC-SCNC: 15 MEQ/L (ref 8–16)
BASOPHILS ABSOLUTE: 0 THOU/MM3 (ref 0–0.1)
BASOPHILS NFR BLD AUTO: 0.3 %
BUN SERPL-MCNC: 17 MG/DL (ref 8–23)
CALCIUM SERPL-MCNC: 9.4 MG/DL (ref 8.8–10.2)
CHLORIDE SERPL-SCNC: 100 MEQ/L (ref 98–111)
CO2 SERPL-SCNC: 24 MEQ/L (ref 22–29)
CREAT SERPL-MCNC: 0.8 MG/DL (ref 0.7–1.2)
DEPRECATED RDW RBC AUTO: 46.2 FL (ref 35–45)
EOSINOPHIL NFR BLD AUTO: 0.2 %
EOSINOPHILS ABSOLUTE: 0 THOU/MM3 (ref 0–0.4)
ERYTHROCYTE [DISTWIDTH] IN BLOOD BY AUTOMATED COUNT: 13.1 % (ref 11.5–14.5)
FLUAV RNA RESP QL NAA+PROBE: NOT DETECTED
FLUBV RNA RESP QL NAA+PROBE: NOT DETECTED
GFR SERPL CREATININE-BSD FRML MDRD: > 90 ML/MIN/1.73M2
GLUCOSE SERPL-MCNC: 101 MG/DL (ref 74–109)
HCT VFR BLD AUTO: 40.4 % (ref 42–52)
HGB BLD-MCNC: 13 GM/DL (ref 14–18)
IMM GRANULOCYTES # BLD AUTO: 0.05 THOU/MM3 (ref 0–0.07)
IMM GRANULOCYTES NFR BLD AUTO: 0.4 %
LACTATE SERPL-SCNC: 1.2 MMOL/L (ref 0.5–2.2)
LYMPHOCYTES ABSOLUTE: 0.8 THOU/MM3 (ref 1–4.8)
LYMPHOCYTES NFR BLD AUTO: 5.6 %
MCH RBC QN AUTO: 30.7 PG (ref 26–33)
MCHC RBC AUTO-ENTMCNC: 32.2 GM/DL (ref 32.2–35.5)
MCV RBC AUTO: 95.5 FL (ref 80–94)
MONOCYTES ABSOLUTE: 1.2 THOU/MM3 (ref 0.4–1.3)
MONOCYTES NFR BLD AUTO: 8.5 %
NEUTROPHILS ABSOLUTE: 12.1 THOU/MM3 (ref 1.8–7.7)
NEUTROPHILS NFR BLD AUTO: 85 %
NRBC BLD AUTO-RTO: 0 /100 WBC
OSMOLALITY SERPL CALC.SUM OF ELEC: 279.2 MOSMOL/KG (ref 275–300)
PLATELET # BLD AUTO: 368 THOU/MM3 (ref 130–400)
PMV BLD AUTO: 9.9 FL (ref 9.4–12.4)
POTASSIUM SERPL-SCNC: 3.9 MEQ/L (ref 3.5–5.2)
PROCALCITONIN SERPL IA-MCNC: 0.06 NG/ML (ref 0.01–0.09)
RBC # BLD AUTO: 4.23 MILL/MM3 (ref 4.7–6.1)
S PYO AG THROAT QL: POSITIVE
S PYO THROAT QL CULT: NORMAL
SARS-COV-2 RNA RESP QL NAA+PROBE: NOT DETECTED
SODIUM SERPL-SCNC: 139 MEQ/L (ref 135–145)
WBC # BLD AUTO: 14.2 THOU/MM3 (ref 4.8–10.8)

## 2025-06-20 PROCEDURE — 87040 BLOOD CULTURE FOR BACTERIA: CPT

## 2025-06-20 PROCEDURE — 31575 DIAGNOSTIC LARYNGOSCOPY: CPT | Performed by: OTOLARYNGOLOGY

## 2025-06-20 PROCEDURE — 2580000003 HC RX 258: Performed by: PHYSICIAN ASSISTANT

## 2025-06-20 PROCEDURE — 6360000004 HC RX CONTRAST MEDICATION: Performed by: PHYSICIAN ASSISTANT

## 2025-06-20 PROCEDURE — 87636 SARSCOV2 & INF A&B AMP PRB: CPT

## 2025-06-20 PROCEDURE — 99285 EMERGENCY DEPT VISIT HI MDM: CPT

## 2025-06-20 PROCEDURE — 80048 BASIC METABOLIC PNL TOTAL CA: CPT

## 2025-06-20 PROCEDURE — 96365 THER/PROPH/DIAG IV INF INIT: CPT

## 2025-06-20 PROCEDURE — 99223 1ST HOSP IP/OBS HIGH 75: CPT

## 2025-06-20 PROCEDURE — 96375 TX/PRO/DX INJ NEW DRUG ADDON: CPT

## 2025-06-20 PROCEDURE — 6370000000 HC RX 637 (ALT 250 FOR IP): Performed by: PHYSICIAN ASSISTANT

## 2025-06-20 PROCEDURE — 85651 RBC SED RATE NONAUTOMATED: CPT

## 2025-06-20 PROCEDURE — 83605 ASSAY OF LACTIC ACID: CPT

## 2025-06-20 PROCEDURE — 85025 COMPLETE CBC W/AUTO DIFF WBC: CPT

## 2025-06-20 PROCEDURE — 84145 PROCALCITONIN (PCT): CPT

## 2025-06-20 PROCEDURE — 0CJS8ZZ INSPECTION OF LARYNX, VIA NATURAL OR ARTIFICIAL OPENING ENDOSCOPIC: ICD-10-PCS | Performed by: OTOLARYNGOLOGY

## 2025-06-20 PROCEDURE — 6360000002 HC RX W HCPCS

## 2025-06-20 PROCEDURE — 2500000003 HC RX 250 WO HCPCS

## 2025-06-20 PROCEDURE — G0378 HOSPITAL OBSERVATION PER HR: HCPCS

## 2025-06-20 PROCEDURE — 70491 CT SOFT TISSUE NECK W/DYE: CPT

## 2025-06-20 PROCEDURE — 36415 COLL VENOUS BLD VENIPUNCTURE: CPT

## 2025-06-20 PROCEDURE — 71046 X-RAY EXAM CHEST 2 VIEWS: CPT

## 2025-06-20 PROCEDURE — 99221 1ST HOSP IP/OBS SF/LOW 40: CPT | Performed by: OTOLARYNGOLOGY

## 2025-06-20 PROCEDURE — 86140 C-REACTIVE PROTEIN: CPT

## 2025-06-20 PROCEDURE — 87880 STREP A ASSAY W/OPTIC: CPT

## 2025-06-20 PROCEDURE — 96366 THER/PROPH/DIAG IV INF ADDON: CPT

## 2025-06-20 RX ORDER — 0.9 % SODIUM CHLORIDE 0.9 %
1000 INTRAVENOUS SOLUTION INTRAVENOUS ONCE
Status: COMPLETED | OUTPATIENT
Start: 2025-06-20 | End: 2025-06-20

## 2025-06-20 RX ORDER — IOPAMIDOL 755 MG/ML
80 INJECTION, SOLUTION INTRAVASCULAR
Status: COMPLETED | OUTPATIENT
Start: 2025-06-20 | End: 2025-06-20

## 2025-06-20 RX ORDER — OXYMETAZOLINE HYDROCHLORIDE 0.05 G/100ML
2 SPRAY NASAL ONCE
Status: DISPENSED | OUTPATIENT
Start: 2025-06-20 | End: 2025-06-23

## 2025-06-20 RX ADMIN — WATER 2000 MG: 1 INJECTION INTRAMUSCULAR; INTRAVENOUS; SUBCUTANEOUS at 21:45

## 2025-06-20 RX ADMIN — IOPAMIDOL 80 ML: 755 INJECTION, SOLUTION INTRAVENOUS at 19:56

## 2025-06-20 RX ADMIN — SODIUM CHLORIDE 1000 ML: 9 INJECTION, SOLUTION INTRAVENOUS at 18:24

## 2025-06-20 RX ADMIN — Medication 1500 MG: at 21:51

## 2025-06-20 RX ADMIN — LIDOCAINE HYDROCHLORIDE: 20 SOLUTION ORAL at 18:16

## 2025-06-20 ASSESSMENT — PAIN - FUNCTIONAL ASSESSMENT
PAIN_FUNCTIONAL_ASSESSMENT: 0-10
PAIN_FUNCTIONAL_ASSESSMENT: 0-10

## 2025-06-20 ASSESSMENT — PAIN DESCRIPTION - LOCATION
LOCATION: THROAT
LOCATION: THROAT

## 2025-06-20 ASSESSMENT — PAIN SCALES - GENERAL
PAINLEVEL_OUTOF10: 5
PAINLEVEL_OUTOF10: 5

## 2025-06-20 ASSESSMENT — PAIN DESCRIPTION - DESCRIPTORS: DESCRIPTORS: SORE

## 2025-06-20 NOTE — ED PROVIDER NOTES
[facility] EMERGENCY DEPARTMENT      EMERGENCY MEDICINE     Pt Name: Esdras Marie  MRN: [id number]  Birthdate 1955  Date of evaluation: 6/20/2025  Provider: NEY Monte    CHIEF COMPLAINT       Chief Complaint   Patient presents with    Pharyngitis     HISTORY OF PRESENT ILLNESS   Esdras Marie is a pleasant 70 y.o. male who presents to the emergency department from from home, by private vehicle for evaluation of sore throat. Patient states he woke up with a sore throat 2 days ago. He states it has been difficult to swallow because it is so painful. He states his current pain is a 5/10. He denies any sick contacts. He denies having an up to date COVID and influenza vaccinations. He denies fever or rash. Denies drooling.     PASTMEDICAL HISTORY     Past Medical History:   Diagnosis Date    Ruptured disk        Patient Active Problem List   Diagnosis Code    Intractable low back pain M54.59    Back pain M54.9     SURGICAL HISTORY       Past Surgical History:   Procedure Laterality Date    BACK SURGERY      IR PERC ASPIRATION INTERVERT DISC  9/15/2021    IR PERC ASPIRATION INTERVERT DISC 9/15/2021 [facility] SPECIAL PROCEDURES    TONSILLECTOMY         CURRENT MEDICATIONS       Previous Medications    ETODOLAC (LODINE) 300 MG CAPSULE    Take 1 capsule by mouth in the morning and 1 capsule at noon and 1 capsule in the evening.       ALLERGIES     is allergic to compazine [prochlorperazine].    FAMILY HISTORY     has no family status information on file.        SOCIAL HISTORY       Social History     Tobacco Use    Smoking status: Every Day     Current packs/day: 1.00     Average packs/day: 1 pack/day for 40.0 years (40.0 ttl pk-yrs)     Types: Cigarettes    Smokeless tobacco: Current   Vaping Use    Vaping status: Every Day   Substance Use Topics    Alcohol use: Yes     Alcohol/week: 14.0 standard drinks of alcohol     Types: 14 Cans of beer per week    Drug use: Not Currently     Types: IV,

## 2025-06-20 NOTE — ED TRIAGE NOTES
Pt presents to ED with chief complaint of pharyngitis. Pt states it started Wednesday morning. Pt states he has severe pain when swallowing. Reports pain mainly on right side. Pt has voice changes.

## 2025-06-21 ENCOUNTER — APPOINTMENT (OUTPATIENT)
Dept: CT IMAGING | Age: 70
DRG: 152 | End: 2025-06-21
Payer: MEDICARE

## 2025-06-21 PROBLEM — R91.8 LUNG MASS: Status: ACTIVE | Noted: 2025-06-21

## 2025-06-21 PROBLEM — J02.0 STREPTOCOCCAL SORE THROAT: Status: ACTIVE | Noted: 2025-06-21

## 2025-06-21 PROBLEM — Z78.9 HISTORY OF INCARCERATION: Status: ACTIVE | Noted: 2025-06-21

## 2025-06-21 PROBLEM — J04.30 SUPRAGLOTTITIS WITHOUT AIRWAY OBSTRUCTION: Status: ACTIVE | Noted: 2025-06-21

## 2025-06-21 PROBLEM — R04.2 HEMOPTYSIS: Status: ACTIVE | Noted: 2025-06-21

## 2025-06-21 PROBLEM — Z72.0 TOBACCO ABUSE: Status: ACTIVE | Noted: 2025-06-21

## 2025-06-21 LAB
ACINETOBACTER CALCOACETICUS-BAUMANNII BY PCR: NOT DETECTED
ANION GAP SERPL CALC-SCNC: 16 MEQ/L (ref 8–16)
BASOPHILS ABSOLUTE: 0 THOU/MM3 (ref 0–0.1)
BASOPHILS NFR BLD AUTO: 0.3 %
BLACTX-M ISLT/SPM QL: ABNORMAL
BLAIMP ISLT/SPM QL: ABNORMAL
BLAKPC ISLT/SPM QL: ABNORMAL
BLAOXA-48-LIKE ISLT/SPM QL: ABNORMAL
BLAVIM ISLT/SPM QL: ABNORMAL
BUN SERPL-MCNC: 16 MG/DL (ref 8–23)
C PNEUM DNA LOWER RESP QL NAA+NON-PROBE: NOT DETECTED
CA-I BLD ISE-SCNC: 1 MMOL/L (ref 1.12–1.32)
CALCIUM SERPL-MCNC: 9.1 MG/DL (ref 8.8–10.2)
CHLORIDE SERPL-SCNC: 106 MEQ/L (ref 98–111)
CO2 SERPL-SCNC: 18 MEQ/L (ref 22–29)
CREAT SERPL-MCNC: 0.6 MG/DL (ref 0.7–1.2)
CRP SERPL-MCNC: 12.7 MG/DL (ref 0–0.5)
DEPRECATED RDW RBC AUTO: 47.5 FL (ref 35–45)
EKG ATRIAL RATE: 87 BPM
EKG P AXIS: 58 DEGREES
EKG P-R INTERVAL: 154 MS
EKG Q-T INTERVAL: 360 MS
EKG QRS DURATION: 84 MS
EKG QTC CALCULATION (BAZETT): 433 MS
EKG R AXIS: -64 DEGREES
EKG T AXIS: 54 DEGREES
EKG VENTRICULAR RATE: 87 BPM
ENTEROBACTER CLOACAE COMPLEX BY PCR: NOT DETECTED
EOSINOPHIL NFR BLD AUTO: 0.5 %
EOSINOPHILS ABSOLUTE: 0.1 THOU/MM3 (ref 0–0.4)
ERYTHROCYTE [DISTWIDTH] IN BLOOD BY AUTOMATED COUNT: 13.2 % (ref 11.5–14.5)
ERYTHROCYTE [SEDIMENTATION RATE] IN BLOOD BY WESTERGREN METHOD: 56 MM/HR (ref 0–20)
ESCHERICHIA COLI BY PCR: NOT DETECTED
FLUAV RNA LOWER RESP QL NAA+NON-PROBE: NOT DETECTED
FLUBV RNA LOWER RESP QL NAA+NON-PROBE: NOT DETECTED
GFR SERPL CREATININE-BSD FRML MDRD: > 90 ML/MIN/1.73M2
GLUCOSE SERPL-MCNC: 78 MG/DL (ref 74–109)
HADV DNA LOWER RESP QL NAA+NON-PROBE: NOT DETECTED
HAEMOPHILUS INFLUENZAE BY PCR: DETECTED
HCOV RNA LOWER RESP QL NAA+NON-PROBE: NOT DETECTED
HCT VFR BLD AUTO: 38.3 % (ref 42–52)
HGB BLD-MCNC: 12.2 GM/DL (ref 14–18)
HMPV RNA LOWER RESP QL NAA+NON-PROBE: NOT DETECTED
HPIV RNA LOWER RESP QL NAA+NON-PROBE: NOT DETECTED
IMM GRANULOCYTES # BLD AUTO: 0.07 THOU/MM3 (ref 0–0.07)
IMM GRANULOCYTES NFR BLD AUTO: 0.6 %
KLEBSIELLA AEROGENES BY PCR: NOT DETECTED
KLEBSIELLA OXYTOCA BY PCR: NOT DETECTED
KLEBSIELLA PNEUMONIAE GROUP BY PCR: NOT DETECTED
L PNEUMO DNA LOWER RESP QL NAA+NON-PROBE: NOT DETECTED
LYMPHOCYTES ABSOLUTE: 0.7 THOU/MM3 (ref 1–4.8)
LYMPHOCYTES NFR BLD AUTO: 6.3 %
M PNEUMO DNA LOWER RESP QL NAA+NON-PROBE: NOT DETECTED
MAGNESIUM SERPL-MCNC: 2.2 MG/DL (ref 1.6–2.6)
MCH RBC QN AUTO: 31 PG (ref 26–33)
MCHC RBC AUTO-ENTMCNC: 31.9 GM/DL (ref 32.2–35.5)
MCV RBC AUTO: 97.5 FL (ref 80–94)
MONOCYTES ABSOLUTE: 1.1 THOU/MM3 (ref 0.4–1.3)
MONOCYTES NFR BLD AUTO: 9.8 %
MORAXELLA CATARRHALIS BY PCR: NOT DETECTED
NEUTROPHILS ABSOLUTE: 9.6 THOU/MM3 (ref 1.8–7.7)
NEUTROPHILS NFR BLD AUTO: 82.5 %
NRBC BLD AUTO-RTO: 0 /100 WBC
PHOSPHATE SERPL-MCNC: 2.8 MG/DL (ref 2.5–4.5)
PLATELET # BLD AUTO: 321 THOU/MM3 (ref 130–400)
PMV BLD AUTO: 9.7 FL (ref 9.4–12.4)
POTASSIUM SERPL-SCNC: 4.1 MEQ/L (ref 3.5–5.2)
PROTEUS SPECIES BY PCR: NOT DETECTED
PSEUDOMONAS AERUGINOSA BY PCR: NOT DETECTED
RBC # BLD AUTO: 3.93 MILL/MM3 (ref 4.7–6.1)
RESISTANT GENE MECA/C & MREJ BY PCR: ABNORMAL
RESISTANT GENE NDM BY PCR: ABNORMAL
RSV RNA LOWER RESP QL NAA+NON-PROBE: NOT DETECTED
RV+EV RNA LOWER RESP QL NAA+NON-PROBE: NOT DETECTED
SERRATIA MARCESCENS BY PCR: NOT DETECTED
SODIUM SERPL-SCNC: 140 MEQ/L (ref 135–145)
SOURCE: ABNORMAL
SPECIMEN ACCEPTABILITY: ABNORMAL
STAPH AUREUS BY PCR: NOT DETECTED
STREP AGALACTIAE BY PCR: NOT DETECTED
STREP PNEUMO AG, UR: NEGATIVE
STREP PNEUMONIAE BY PCR: NOT DETECTED
STREP PYOGENES BY PCR: NOT DETECTED
WBC # BLD AUTO: 11.6 THOU/MM3 (ref 4.8–10.8)

## 2025-06-21 PROCEDURE — 2580000003 HC RX 258: Performed by: INTERNAL MEDICINE

## 2025-06-21 PROCEDURE — 87581 M.PNEUMON DNA AMP PROBE: CPT

## 2025-06-21 PROCEDURE — 96375 TX/PRO/DX INJ NEW DRUG ADDON: CPT

## 2025-06-21 PROCEDURE — 6360000002 HC RX W HCPCS

## 2025-06-21 PROCEDURE — 6360000004 HC RX CONTRAST MEDICATION: Performed by: INTERNAL MEDICINE

## 2025-06-21 PROCEDURE — 87631 RESP VIRUS 3-5 TARGETS: CPT

## 2025-06-21 PROCEDURE — G0378 HOSPITAL OBSERVATION PER HR: HCPCS

## 2025-06-21 PROCEDURE — 96368 THER/DIAG CONCURRENT INF: CPT

## 2025-06-21 PROCEDURE — 93005 ELECTROCARDIOGRAM TRACING: CPT

## 2025-06-21 PROCEDURE — 92610 EVALUATE SWALLOWING FUNCTION: CPT

## 2025-06-21 PROCEDURE — 87899 AGENT NOS ASSAY W/OPTIC: CPT

## 2025-06-21 PROCEDURE — 99232 SBSQ HOSP IP/OBS MODERATE 35: CPT | Performed by: INTERNAL MEDICINE

## 2025-06-21 PROCEDURE — 86480 TB TEST CELL IMMUN MEASURE: CPT

## 2025-06-21 PROCEDURE — 82330 ASSAY OF CALCIUM: CPT

## 2025-06-21 PROCEDURE — 99222 1ST HOSP IP/OBS MODERATE 55: CPT

## 2025-06-21 PROCEDURE — 93010 ELECTROCARDIOGRAM REPORT: CPT | Performed by: INTERNAL MEDICINE

## 2025-06-21 PROCEDURE — 87205 SMEAR GRAM STAIN: CPT

## 2025-06-21 PROCEDURE — 85025 COMPLETE CBC W/AUTO DIFF WBC: CPT

## 2025-06-21 PROCEDURE — 87798 DETECT AGENT NOS DNA AMP: CPT

## 2025-06-21 PROCEDURE — 94669 MECHANICAL CHEST WALL OSCILL: CPT

## 2025-06-21 PROCEDURE — 71260 CT THORAX DX C+: CPT

## 2025-06-21 PROCEDURE — 87486 CHLMYD PNEUM DNA AMP PROBE: CPT

## 2025-06-21 PROCEDURE — 87541 LEGION PNEUMO DNA AMP PROB: CPT

## 2025-06-21 PROCEDURE — 96367 TX/PROPH/DG ADDL SEQ IV INF: CPT

## 2025-06-21 PROCEDURE — 80048 BASIC METABOLIC PNL TOTAL CA: CPT

## 2025-06-21 PROCEDURE — 2500000003 HC RX 250 WO HCPCS: Performed by: INTERNAL MEDICINE

## 2025-06-21 PROCEDURE — 2580000003 HC RX 258

## 2025-06-21 PROCEDURE — 83735 ASSAY OF MAGNESIUM: CPT

## 2025-06-21 PROCEDURE — 96366 THER/PROPH/DIAG IV INF ADDON: CPT

## 2025-06-21 PROCEDURE — 87070 CULTURE OTHR SPECIMN AEROBIC: CPT

## 2025-06-21 PROCEDURE — 6360000002 HC RX W HCPCS: Performed by: INTERNAL MEDICINE

## 2025-06-21 PROCEDURE — 92526 ORAL FUNCTION THERAPY: CPT

## 2025-06-21 PROCEDURE — 6360000002 HC RX W HCPCS: Performed by: STUDENT IN AN ORGANIZED HEALTH CARE EDUCATION/TRAINING PROGRAM

## 2025-06-21 PROCEDURE — 84100 ASSAY OF PHOSPHORUS: CPT

## 2025-06-21 PROCEDURE — 96376 TX/PRO/DX INJ SAME DRUG ADON: CPT

## 2025-06-21 PROCEDURE — 2500000003 HC RX 250 WO HCPCS

## 2025-06-21 PROCEDURE — 99223 1ST HOSP IP/OBS HIGH 75: CPT | Performed by: STUDENT IN AN ORGANIZED HEALTH CARE EDUCATION/TRAINING PROGRAM

## 2025-06-21 PROCEDURE — 36415 COLL VENOUS BLD VENIPUNCTURE: CPT

## 2025-06-21 RX ORDER — ONDANSETRON 4 MG/1
4 TABLET, ORALLY DISINTEGRATING ORAL EVERY 8 HOURS PRN
Status: DISCONTINUED | OUTPATIENT
Start: 2025-06-21 | End: 2025-06-24 | Stop reason: HOSPADM

## 2025-06-21 RX ORDER — METRONIDAZOLE 500 MG/100ML
500 INJECTION, SOLUTION INTRAVENOUS EVERY 8 HOURS
Status: DISCONTINUED | OUTPATIENT
Start: 2025-06-21 | End: 2025-06-22

## 2025-06-21 RX ORDER — IPRATROPIUM BROMIDE AND ALBUTEROL SULFATE 2.5; .5 MG/3ML; MG/3ML
1 SOLUTION RESPIRATORY (INHALATION) EVERY 4 HOURS PRN
Status: DISCONTINUED | OUTPATIENT
Start: 2025-06-21 | End: 2025-06-24 | Stop reason: HOSPADM

## 2025-06-21 RX ORDER — IBUPROFEN 200 MG
200 TABLET ORAL EVERY 6 HOURS PRN
COMMUNITY

## 2025-06-21 RX ORDER — ONDANSETRON 2 MG/ML
4 INJECTION INTRAMUSCULAR; INTRAVENOUS EVERY 6 HOURS PRN
Status: DISCONTINUED | OUTPATIENT
Start: 2025-06-21 | End: 2025-06-24 | Stop reason: HOSPADM

## 2025-06-21 RX ORDER — POTASSIUM CHLORIDE 7.45 MG/ML
10 INJECTION INTRAVENOUS PRN
Status: ACTIVE | OUTPATIENT
Start: 2025-06-21 | End: 2025-06-23

## 2025-06-21 RX ORDER — SODIUM CHLORIDE 0.9 % (FLUSH) 0.9 %
5-40 SYRINGE (ML) INJECTION EVERY 12 HOURS SCHEDULED
Status: DISCONTINUED | OUTPATIENT
Start: 2025-06-21 | End: 2025-06-23 | Stop reason: SDUPTHER

## 2025-06-21 RX ORDER — IOPAMIDOL 755 MG/ML
80 INJECTION, SOLUTION INTRAVASCULAR
Status: COMPLETED | OUTPATIENT
Start: 2025-06-21 | End: 2025-06-21

## 2025-06-21 RX ORDER — MORPHINE SULFATE 2 MG/ML
2 INJECTION, SOLUTION INTRAMUSCULAR; INTRAVENOUS
Refills: 0 | Status: DISCONTINUED | OUTPATIENT
Start: 2025-06-21 | End: 2025-06-24 | Stop reason: HOSPADM

## 2025-06-21 RX ORDER — POTASSIUM CHLORIDE 1500 MG/1
40 TABLET, EXTENDED RELEASE ORAL PRN
Status: ACTIVE | OUTPATIENT
Start: 2025-06-21 | End: 2025-06-23

## 2025-06-21 RX ORDER — SODIUM CHLORIDE 9 MG/ML
INJECTION, SOLUTION INTRAVENOUS PRN
Status: DISCONTINUED | OUTPATIENT
Start: 2025-06-21 | End: 2025-06-23 | Stop reason: SDUPTHER

## 2025-06-21 RX ORDER — ACETAMINOPHEN 325 MG/1
650 TABLET ORAL EVERY 6 HOURS PRN
Status: DISCONTINUED | OUTPATIENT
Start: 2025-06-21 | End: 2025-06-24 | Stop reason: HOSPADM

## 2025-06-21 RX ORDER — SODIUM CHLORIDE 0.9 % (FLUSH) 0.9 %
5-40 SYRINGE (ML) INJECTION PRN
Status: DISCONTINUED | OUTPATIENT
Start: 2025-06-21 | End: 2025-06-23 | Stop reason: SDUPTHER

## 2025-06-21 RX ORDER — POLYETHYLENE GLYCOL 3350 17 G/17G
17 POWDER, FOR SOLUTION ORAL DAILY PRN
Status: DISCONTINUED | OUTPATIENT
Start: 2025-06-21 | End: 2025-06-24 | Stop reason: HOSPADM

## 2025-06-21 RX ORDER — MORPHINE SULFATE 4 MG/ML
4 INJECTION, SOLUTION INTRAMUSCULAR; INTRAVENOUS
Refills: 0 | Status: DISCONTINUED | OUTPATIENT
Start: 2025-06-21 | End: 2025-06-24 | Stop reason: HOSPADM

## 2025-06-21 RX ORDER — MAGNESIUM SULFATE IN WATER 40 MG/ML
2000 INJECTION, SOLUTION INTRAVENOUS PRN
Status: DISCONTINUED | OUTPATIENT
Start: 2025-06-21 | End: 2025-06-24 | Stop reason: HOSPADM

## 2025-06-21 RX ORDER — ACETAMINOPHEN 650 MG/1
650 SUPPOSITORY RECTAL EVERY 6 HOURS PRN
Status: DISCONTINUED | OUTPATIENT
Start: 2025-06-21 | End: 2025-06-24 | Stop reason: HOSPADM

## 2025-06-21 RX ORDER — SODIUM CHLORIDE 9 MG/ML
INJECTION, SOLUTION INTRAVENOUS CONTINUOUS
Status: DISCONTINUED | OUTPATIENT
Start: 2025-06-21 | End: 2025-06-22

## 2025-06-21 RX ADMIN — WATER 2000 MG: 1 INJECTION INTRAMUSCULAR; INTRAVENOUS; SUBCUTANEOUS at 12:04

## 2025-06-21 RX ADMIN — MORPHINE SULFATE 4 MG: 4 INJECTION, SOLUTION INTRAMUSCULAR; INTRAVENOUS at 12:04

## 2025-06-21 RX ADMIN — METRONIDAZOLE 500 MG: 500 INJECTION, SOLUTION INTRAVENOUS at 18:41

## 2025-06-21 RX ADMIN — MORPHINE SULFATE 2 MG: 2 INJECTION, SOLUTION INTRAMUSCULAR; INTRAVENOUS at 00:34

## 2025-06-21 RX ADMIN — SODIUM CHLORIDE 25 ML: 0.9 INJECTION, SOLUTION INTRAVENOUS at 04:22

## 2025-06-21 RX ADMIN — IOPAMIDOL 80 ML: 755 INJECTION, SOLUTION INTRAVENOUS at 11:07

## 2025-06-21 RX ADMIN — CEFEPIME 2000 MG: 2 INJECTION, POWDER, FOR SOLUTION INTRAVENOUS at 18:38

## 2025-06-21 RX ADMIN — SODIUM CHLORIDE, PRESERVATIVE FREE 10 ML: 5 INJECTION INTRAVENOUS at 08:16

## 2025-06-21 RX ADMIN — SODIUM CHLORIDE: 0.9 INJECTION, SOLUTION INTRAVENOUS at 12:01

## 2025-06-21 ASSESSMENT — PAIN DESCRIPTION - ORIENTATION
ORIENTATION: INNER

## 2025-06-21 ASSESSMENT — PAIN - FUNCTIONAL ASSESSMENT
PAIN_FUNCTIONAL_ASSESSMENT: ACTIVITIES ARE NOT PREVENTED

## 2025-06-21 ASSESSMENT — PAIN DESCRIPTION - FREQUENCY: FREQUENCY: CONTINUOUS

## 2025-06-21 ASSESSMENT — PAIN DESCRIPTION - LOCATION
LOCATION: THROAT

## 2025-06-21 ASSESSMENT — PAIN DESCRIPTION - DESCRIPTORS
DESCRIPTORS: SORE;SHARP

## 2025-06-21 ASSESSMENT — PAIN SCALES - GENERAL
PAINLEVEL_OUTOF10: 5
PAINLEVEL_OUTOF10: 5
PAINLEVEL_OUTOF10: 7
PAINLEVEL_OUTOF10: 5
PAINLEVEL_OUTOF10: 5
PAINLEVEL_OUTOF10: 4

## 2025-06-21 ASSESSMENT — PAIN DESCRIPTION - PAIN TYPE: TYPE: ACUTE PAIN

## 2025-06-21 ASSESSMENT — PAIN DESCRIPTION - ONSET: ONSET: ON-GOING

## 2025-06-21 ASSESSMENT — LIFESTYLE VARIABLES
HOW MANY STANDARD DRINKS CONTAINING ALCOHOL DO YOU HAVE ON A TYPICAL DAY: 1 OR 2
HOW OFTEN DO YOU HAVE A DRINK CONTAINING ALCOHOL: MONTHLY OR LESS

## 2025-06-21 NOTE — PLAN OF CARE
Problem: Discharge Planning  Goal: Discharge to home or other facility with appropriate resources  Outcome: Progressing  Flowsheets (Taken 6/21/2025 0127)  Discharge to home or other facility with appropriate resources:   Arrange for needed discharge resources and transportation as appropriate   Identify barriers to discharge with patient and caregiver   Identify discharge learning needs (meds, wound care, etc)     Problem: Pain  Goal: Verbalizes/displays adequate comfort level or baseline comfort level  Outcome: Progressing  Flowsheets (Taken 6/21/2025 0127)  Verbalizes/displays adequate comfort level or baseline comfort level:   Encourage patient to monitor pain and request assistance   Assess pain using appropriate pain scale   Administer analgesics based on type and severity of pain and evaluate response   Implement non-pharmacological measures as appropriate and evaluate response     Problem: ABCDS Injury Assessment  Goal: Absence of physical injury  Outcome: Progressing  Flowsheets (Taken 6/21/2025 0127)  Absence of Physical Injury: Implement safety measures based on patient assessment     Problem: Coping  Goal: Pt/Family able to verbalize concerns and demonstrate effective coping strategies  Description: INTERVENTIONS:  1. Assist patient/family to identify coping skills, available support systems and cultural and spiritual values  2. Provide emotional support, including active listening and acknowledgement of concerns of patient and caregivers  3. Reduce environmental stimuli, as able  4. Instruct patient/family in relaxation techniques, as appropriate  5. Assess for spiritual pain/suffering and initiate Spiritual Care, Psychosocial Clinical Specialist consults as needed  Outcome: Progressing  Flowsheets (Taken 6/21/2025 0127)  Patient/family able to verbalize anxieties, fears, and concerns, and demonstrate effective coping:   Reduce environmental stimuli, as able   Provide emotional support, including

## 2025-06-21 NOTE — PROGRESS NOTES
[facility]  SPEECH THERAPY  [facility]K  Clinical Swallow Evaluation    Discharge Recommendations: Continue to assess pending progress  DIET ORDER RECOMMENDATIONS AFTER EVALUATION: Puree, thin liquids - MBS order active  Strategies:  Full Upright Position, Small Bite/Sip, Pulmonary Monitoring, Alternate Solids and Liquids, and Strategies pending MBS completion     SLP Individual Minutes  Time In: 1330  Time Out: 1352  Minutes: 22  Timed Code Treatment Minutes: 0 Minutes       Date: 2025  Patient Name: Esdras Marie      CSN: [id number]   : 1955  (70 y.o.)  Gender: male   Referring Physician:  Nitin Pack MD     Diagnosis: Adult supraglottitis    History of Present Illness/Injury: Patient admitted to [facility] with above med dx; please refer to physician H&P for full details. Per chart review, \"70 y.o. male with PMHx of tobacco abuse who presents to [facility] with sore throat. Patient states that for the last 2 days he has been having a severe sore throat. He states that he has been not able to swallow well and the pain is a 5/10, but pain worsens to 10/10 when trying to swallow. Denies any sick contacts. Denies nausea, vomiting, diarrhea, chest pain and shortness of breath.\"    ENT consulted in the ED with laryngoscope completed: \"mucosal changes in the arytenoid and aryepiglottic folds suspicious for malignancy. Epiglottis is normal. VF are mobile in adduction and abduction, secretions pooling with penetration and aspiration. No concerns about airway compromise at this time\"    CT chest  IMPRESSION:  1. Right upper lobe collapse.There is an ill-defined right paramediastinal and  hilar mass.  2. Enlarged mediastinal lymph nodes    Past Medical History:   Diagnosis Date    Ruptured disk        SUBJECTIVE:  MEREDITH Fowler with approval to proceed with evaluation; indications for ENT rounding earlier at date (Ambika Flores MD) with request for FEES.  GOALS:  Short Term Goals  Time Frame for Short Term Goals: 1-3 sessions  Goal 1: Complete instrumental evaluation via MBS for further diagnostic assessment of pharyngeal integrity.    LONG TERM GOALS:  No LTG established given short ELOS      Joceline German M.A., CCC-SLP [id number]

## 2025-06-21 NOTE — CONSULTS
of the [place].  He has had no significant travel outside of the state in the past 6 to 7 years.  He has no pets at home.  He is currently living in the local area and has been here for all of his life.     PAST MEDICAL AND SURGICAL HISTORY       Past Medical History:   Diagnosis Date    Ruptured disk        Past Surgical History:   Procedure Laterality Date    BACK SURGERY      IR PERC ASPIRATION INTERVERT DISC  9/15/2021    IR PERC ASPIRATION INTERVERT DISC 9/15/2021 [facility] SPECIAL PROCEDURES    TONSILLECTOMY           MEDICATIONS PRIOR TO ADMISSION:       Scheduled Meds:   sodium chloride flush  5-40 mL IntraVENous 2 times per day    cefepime  2,000 mg IntraVENous Once    Followed by    cefepime  2,000 mg IntraVENous Q8H    oxymetazoline  2 spray Each Nostril Once     Continuous Infusions:   sodium chloride 25 mL (06/21/25 0422)     PRN Meds:sodium chloride flush, sodium chloride, potassium chloride **OR** potassium alternative oral replacement **OR** potassium chloride, magnesium sulfate, ondansetron **OR** ondansetron, polyethylene glycol, acetaminophen **OR** acetaminophen, morphine **OR** morphine  Allergies:   ALLERGIES: Compazine [prochlorperazine]           SOCIAL HISTORY:     TOBACCO:   reports that he has been smoking cigarettes. He has a 40 pack-year smoking history. He uses smokeless tobacco.     ETOH:   reports current alcohol use.        FAMILY HISTORY:         Family history unknown: Yes       REVIEW OF SYSTEMS:     10 point review of systems performed is otherwise negative    PHYSICAL EXAM:     /74   Pulse 80   Temp 98.1 °F (36.7 °C) (Oral)   Resp 20   Ht 1.727 m (5' 8\")   Wt 59 kg (130 lb)   SpO2 94%   BMI 19.77 kg/m²   General:  Awake, alert, not in distress.  HEENT: pink conjunctiva, unicteric sclera, moist oral mucosa.  Chest:  bilateral air entry, Clear to auscultation,   Cardiovascular:  RRR ,S1S2, no murmur or gallop.  Abdomen:  Soft, non tender to palpation.  Extremities:  09/12/2021 02:07 PM           Problem list of patient     Patient Active Problem List   Diagnosis    Intractable low back pain    Back pain    Adult supraglottitis    Lung mass    Streptococcal sore throat    Tobacco abuse    Supraglottitis without airway obstruction             Nader Randall MD, MD, 6/21/2025 8:55 AM

## 2025-06-21 NOTE — ED PROVIDER NOTES
[facility] ORTHOPEDICS 7K      EMERGENCY MEDICINE     Pt Name: Esdras Marie  MRN: [id number]  Birthdate 1955  Date of evaluation: 6/20/2025  Provider: Mercedes Garcia PA-C    CHIEF COMPLAINT       Chief Complaint   Patient presents with    Pharyngitis     HISTORY OF PRESENT ILLNESS   Esdras Marie is a pleasant 70 y.o. male who presents to the emergency department from from home, as a walk in to the ED lobby for evaluation of pharyngitis.      Transfer of care from [facility] at 1900 on 6/20/25.  Patient chief complaint of pharyngitis x 3 days started from Wednesday.  He states that every time he tries to swallow food he would get intense pain around the throat.  Due to this he has been unable to tolerate p.o.  Otherwise patient denies nausea vomiting, fever chills, difficulty breathing, chest pain.  He was unsure of sick contacts.       PASTMEDICAL HISTORY     Past Medical History:   Diagnosis Date    Ruptured disk        Patient Active Problem List   Diagnosis Code    Intractable low back pain M54.59    Back pain M54.9    Adult supraglottitis J04.30     SURGICAL HISTORY       Past Surgical History:   Procedure Laterality Date    BACK SURGERY      IR PERC ASPIRATION INTERVERT DISC  9/15/2021    IR PERC ASPIRATION INTERVERT DISC 9/15/2021 [facility] SPECIAL PROCEDURES    TONSILLECTOMY         CURRENT MEDICATIONS       Current Discharge Medication List        CONTINUE these medications which have NOT CHANGED    Details   ibuprofen (ADVIL;MOTRIN) 200 MG tablet Take 1 tablet by mouth every 6 hours as needed for Pain      etodolac (LODINE) 300 MG capsule Take 1 capsule by mouth in the morning and 1 capsule at noon and 1 capsule in the evening.  Qty: 30 capsule, Refills: 0             ALLERGIES     is allergic to compazine [prochlorperazine].    FAMILY HISTORY     has no family status information on file.        SOCIAL HISTORY       Social History     Tobacco Use    Smoking status: Every Day     Current packs/day:

## 2025-06-21 NOTE — PROGRESS NOTES
Care)  [] SNF (Skilled Nursing Facility)       Assessment:    Principal Problem:    Adult supraglottitis  Active Problems:    Lung mass    Streptococcal sore throat    Tobacco abuse    Supraglottitis without airway obstruction  Resolved Problems:    * No resolved hospital problems. *  Positive Rapid Strep A Screen   Findings consistent with supraglottitis with sparing of the epiglottis on soft tissue neck CT 6/202/2025. Right upper lobe mass obstructing the right upper lobe bronchus with postobstructive atelectasis, suspicious for malignancy. Superimposed pneumonia cannot be excluded.  Procalcitonin 0.06.  Positive haemophilus influenza by PCR.      Plan:    Start Cefepime  Start 0.9 NS at 100 mL/h since NPO.  ST evaluation.  Fiberoptic Endoscopic Evaluation of Swallowing (FEES) on Monday.  ENT following.  Pulmonary consult.  ID consult.  Diet NPO  Continuous Infusions:    sodium chloride 25 mL (06/21/25 0422)    sodium chloride       sodium chloride flush, 5-40 mL, 2 times per day  cefepime, 2,000 mg, Once   Followed by  cefepime, 2,000 mg, Q8H  oxymetazoline, 2 spray, Once      Code Status:  Full Code  Presently pharmacologic DVT prophylaxis is on hold.  IP CONSULT TO OTOLARYNGOLOGY  IP CONSULT TO PULMONOLOGY  IP CONSULT TO INFECTIOUS DISEASES      /O (24Hr):    Intake/Output Summary (Last 24 hours) at 6/21/2025 1106  Last data filed at 6/21/2025 0926  Gross per 24 hour   Intake 0 ml   Output 300 ml   Net -300 ml       Patient Vitals for the past 96 hrs (Last 3 readings):   Weight   06/20/25 1728 59 kg (130 lb)     Admission weight: 59 kg (130 lb)  Wt Readings from Last 3 Encounters:   06/20/25 59 kg (130 lb)   05/02/25 65.8 kg (145 lb)   09/12/21 66.9 kg (147 lb 6.4 oz)    (encounters)    Vitals:    06/21/25 0034 06/21/25 0104 06/21/25 0335 06/21/25 0800   BP:   134/70 124/74   Pulse:   91 80   Resp: 16 16 28 20   Temp:   98.4 °F (36.9 °C) 98.1 °F (36.7 °C)   TempSrc:   Oral Oral   SpO2:   95% 94%   Weight:        Height:           CBC:   Recent Labs     06/20/25  1824 06/21/25  0757   WBC 14.2* 11.6*   RBC 4.23* 3.93*   HGB 13.0* 12.2*   HCT 40.4* 38.3*   MCV 95.5* 97.5*   MCH 30.7 31.0   MCHC 32.2 31.9*    321   MPV 9.9 9.7         MAG:   Recent Labs     06/21/25  0757   MG 2.2       CMP:   Recent Labs     06/20/25  1824 06/21/25  0757    140   K 3.9 4.1    106   CO2 24 18*   BUN 17 16   CREATININE 0.8 0.6*   GLUCOSE 101 78   CALCIUM 9.4 9.1      No results for input(s): \"LIPASE\", \"AMYLASE\" in the last 72 hours.    Phosphorus:    Recent Labs     06/21/25  0757   PHOS 2.8       Folate and B12: No results found for: \"TUASKVLU37\", No results found for: \"FOLATE\"  Thyroid Studies: No results found for: \"TSH\", \"I6AFMJX\", \"THYROIDAB\"  D-Dimer: No results found for: \"DDIMER\"    Lactic acid: No components found for: \"LACT\"     Troponin T No results for input(s): \"TROPHS\" in the last 72 hours.    Patient Active Problem List   Diagnosis    Intractable low back pain    Back pain    Adult supraglottitis    Lung mass    Streptococcal sore throat    Tobacco abuse    Supraglottitis without airway obstruction       PAST MEDICAL HISTORY    has a past medical history of Ruptured disk.    SURGICAL HISTORY      has a past surgical history that includes back surgery; Tonsillectomy; and IR PERC ASPIRATION INTERVERT DISC (9/15/2021).    CURRENT MEDICATIONS     sodium chloride flush, 5-40 mL, 2 times per day  cefepime, 2,000 mg, Once   Followed by  cefepime, 2,000 mg, Q8H  oxymetazoline, 2 spray, Once        Continuous Infusions:    sodium chloride 25 mL (06/21/25 0422)    sodium chloride         PRN:  sodium chloride flush, 5-40 mL, PRN  sodium chloride, , PRN  potassium chloride, 40 mEq, PRN   Or  potassium alternative oral replacement, 40 mEq, PRN   Or  potassium chloride, 10 mEq, PRN  magnesium sulfate, 2,000 mg, PRN  ondansetron, 4 mg, Q8H PRN   Or  ondansetron, 4 mg, Q6H PRN  polyethylene glycol, 17 g, Daily

## 2025-06-21 NOTE — PROGRESS NOTES
Pharmacy Note - Extended Infusion Beta-Lactam Dose Adjustment    Cefepime 2000 mg q12h intermittent infusion for treatment of Community acquired pneumonia. Per [facility] Extended Infusion Beta-Lactam Policy, cefepime will be changed to 2000 mg loading dose followed by 2000 mg q8h extended infusion    Estimated Creatinine Clearance: Estimated Creatinine Clearance: 72 mL/min (based on SCr of 0.8 mg/dL).    Dialysis Status, MARLYS, CKD: Normal    BMI: Body mass index is 19.77 kg/m².    Rationale for Adjustment: Dose adjusted per [facility] Extended Infusion Policy based on renal function and indication. The above medication is renally eliminated and demonstrates time-dependent effects on bacterial eradication. Extended-infusion dosing strategy aims to enhance microbiologic and clinical efficacy.     Pharmacy will monitor renal function daily and adjust dose as necessary.    Please call with any questions.

## 2025-06-21 NOTE — ED NOTES
ED to inpatient nurses report      Chief Complaint:  Chief Complaint   Patient presents with    Pharyngitis     Present to ED from: Home    MOA:     LOC: alert and orientated to name, place, date  Mobility: Independent  Oxygen Baseline: 0    Current needs required: 0     Code Status:   Prior    What abnormal results were found and what did you give/do to treat them? Lung mass, supraglottitis, strep (IV antibiotics)  Any procedures or intervention occur? No    Mental Status:  Level of Consciousness: Alert (0)    Psych Assessment:        Vitals:  Patient Vitals for the past 24 hrs:   BP Temp Temp src Pulse Resp SpO2 Height Weight   06/20/25 2046 126/82 -- -- 91 18 98 % -- --   06/20/25 1757 -- -- -- 98 -- -- -- --   06/20/25 1728 112/60 98.1 °F (36.7 °C) Oral (!) 111 18 97 % 1.727 m (5' 8\") 59 kg (130 lb)        LDAs:   Peripheral IV 06/20/25 Right Antecubital (Active)   Site Assessment Clean, dry & intact 06/20/25 1824   Line Status Infusing 06/20/25 1824   Phlebitis Assessment No symptoms 06/20/25 1824   Infiltration Assessment 0 06/20/25 1824   Dressing Status Clean, dry & intact 06/20/25 1824   Dressing Type Transparent 06/20/25 1824       Ambulatory Status:  Presents to emergency department  because of falls (Syncope, seizure, or loss of consciousness): No, Age > 70: No, Altered Mental Status, Intoxication with alcohol or substance confusion (Disorientation, impaired judgment, poor safety awaremess, or inability to follow instructions): No, Impaired Mobility: Ambulates or transfers with assistive devices or assistance; Unable to ambulate or transer.: No, Nursing Judgement: No    Diagnosis:  DISPOSITION Decision To Admit 06/20/2025 09:58:25 PM   Final diagnoses:   Lung mass   Supraglottitis without airway obstruction   Streptococcal sore throat        Consults:  IP CONSULT TO OTOLARYNGOLOGY     Pain Score:  Pain Assessment  Pain Assessment: 0-10  Pain Level: 5  Patient's Stated Pain Goal: 2  Pain Location:

## 2025-06-21 NOTE — PROGRESS NOTES
Pt admitted to  7K6 from ED.     Complaints: Sore throat/strep/adult supraglottitis.      IV located in right AC. V site free of s/s of infection or infiltration. Vital signs obtained. Assessment and data collection initiated.     Two nurse skin assessment performed by Anni HARPER and Jaz HARPER. Oriented to room.     Explained patients right to have family, representative or physician notified of their admission.  Patient has Declined for physician to be notified.  Patient has Declined for family/representative to be notified.    The patient is interested in [facility] meds to beds program?:  Yes    Policies and procedures for 7 explained. All questions answered with no further questions at this time. Fall prevention and safety discussed with patient.  Bed alarm on. Call light in reach.

## 2025-06-21 NOTE — CONSULTS
[facility] EMERGENCY DEPARTMENT  [address] [id number]  Dept: [phone]  Loc: [phone]    Esdras Marie is a 70 y.o. male who was referred byNo ref. provider found for:  Chief Complaint   Patient presents with    Pharyngitis   Assessment & Plan   Assessment & Plan   Diagnoses and all orders for this visit:    Mucosal changes in the arytenoid and aryepiglottic folds suspicious for malignancy. Epiglottis is normal. VF are mobile in adduction and abduction, secretions pooling with penetration and aspiration. No concerns about airway compromise at this time.  Right upper lobe mass obstructing the right upper lobe bronchus with   postobstructive atelectasis, suspicious for malignancy.   Positive Strep test:    Discussed the case with ED team. Pt needs antibiotic treatment for positive strep test, and further study regarding aspiration, supraglottics mucosa changes and right upper lobe mass with high suspicious of malignancy.      The findings were explained and his questions were answered.  .    HPI:     Esdras Marie is a 70 y.o. male who presents today for pharyngitis. Esdras Marie is a pleasant 70 y.o. male who presents to the emergency department from from home, by private vehicle for evaluation of sore throat. Patient states he woke up with a sore throat 2 days ago. He states it has been difficult to swallow because it is so painful. He states his current pain is a 5/10. He denies any sick contacts. He denies having an up to date COVID and influenza vaccinations. He denies fever or rash. Denies drooling. CT scan neck: Findings consistent with supraglottitis with sparing of the epiglottis.  Right upper lobe mass obstructing the right upper lobe bronchus with   postobstructive atelectasis, suspicious for malignancy. Superimposed   pneumonia cannot be excluded.  Nonemergent/incidental findings above.    History:     Allergies  without retraction. Middle ear space is clear.  Left:    External ear have a normal appearance with no masses, lesions, or scars. TM flat, intact without retraction. Middle ear space is clear.  NOSE: Normal external Anatomy.   Mouth: Lips Free from lesions, ulcers, blisters, or growths, tongue in the midline. Uvula midline. Pharyngeal walls and tonsillar fossae without abnormalities.No tonsillar exudate or tonsillar abscesses.   NECK: supple and non-tender. No lymphadenopathy on neck palpation.No masses, tenderness, or enlargement of thyroid on palpation.  NEUROLOGIC:  Facial nerve function grade I. Other cranial nerves grossly WNL. Normal Romberg test. Coordination is intact. Finger-to-nose testing is performed smoothly and accurately without dysmetria.     Procedure Note    Endoscopy Type:  Flexible Laryngoscopy    Indication:    Anesthesia: none      Procedure Details:    The procedure was explained to the patient who verbally agreed to proceed. The patient was placed in the sitting position.  After topical anesthesia and decongestion, the 4 mm laryngoscope was passed.  The nasal cavities, nasopharynx, oropharynx, hypopharynx, and larynx were all examined.  Vocal cords were examined during respiration and phonation.  The following findings were noted:    Findings:   Nasopharynx clear. No masses/lesions/polyposis/mucosal ulceration,  Bilateral eustachian tube orifices non-obstructed.  Palate elevation and closure:  Complete  Pharyngeal strength: Grossly present  Base of tongue, vallecula, Lingual & laryngeal surface of epiglottis, and piriform sinuses clear. No RP bulging.   Penetration noted: yes.  Aspiration noted:yes.  Secretions pooling: yes  False vocal folds w/o masses/lesions/mucosal ulceration.   Compression/ hyperfunction: none.  True vocal folds VF mobile bilaterally in adduction and abduction.  Arytenoids:mucosal changes in the arytenoid and aryepiglottic fold suspicious for malignancy.

## 2025-06-21 NOTE — H&P
Hospitalist History & Physical         Patient: Esdras Marie 70 y.o. male      : 1955  Date of Admission: 2025  Date of Service: Pt seen/examined on 25 and Admitted to Observation with expected LOS less than two midnights due to medical therapy.         ASSESSMENT AND PLAN    Sore throat 2/2 Supraglottitis: Positive throat culture for group a strep  ENT consulted in ED, appreciate recommendations  Continue Ceftriaxone for now, consider switching to Unasyn pending clinical course    Right upper lobe mass, query mets:   Consult pulmonology in am for possible bronch. Patient agreeable for further workup as deemed necessary.  Continuous pulse ox, maintain SPO2 > 90%    Query CAP: Continue ceftriaxone at this time. Will add Azithromycin for now, deescalate as able.   Good pulmonary hygiene  Continuous pulse ox, titrate oxygen to maintain SPO2 > 90%  Pneumonia panel pending    Chronic Conditions (reviewed and stable unless otherwise stated)   Tobacco abuse      Data reviewed (unless otherwise discussed in assessment/plan)  EKG:  I have reviewed the EKG with the following interpretation: NSR  Imaging: I have reviewed CT neck with the following interpretation: Findings consistent with supraglottitis with sparing of the epiglottis. Right upper lobe mass obstructing the right upper lobe bronchus with postobstructive atelectasis, suspicious for malignancy. Superimposed pneumonia cannot be excluded.  Labs: Reviewed, see chart and plan above.       =======================================================================    SUBJECTIVE    Chief Complaint:  sore throat    History Of Present Illness:  Esdras Marie is a 70 y.o. male with PMHx of tobacco abuse who presents to [facility] with sore throat.  Patient states that for the last 2 days he has been having a severe sore throat. He states that he has been not able to swallow well and the pain is a 5/10, but pain worsens to 10/10  when trying to swallow. Denies any sick contacts. Denies nausea, vomiting, diarrhea, chest pain and shortness of breath.     ED course: labs drawn, imaging obtained, ENT consulted in ED    Review of Systems:   Pertinent positives and negatives as noted in the HPI. Otherwise complete ROS negative.    OBJECTIVE  Physical Exam:  /82   Pulse 91   Temp 98.1 °F (36.7 °C) (Oral)   Resp 18   Ht 1.727 m (5' 8\")   Wt 59 kg (130 lb)   SpO2 98%   BMI 19.77 kg/m²     General appearance: No apparent distress, appears stated age.  Eyes:  Pupils equal, round, and reactive to light. Conjunctivae/corneas clear.  HENT: Head normal in appearance. External nares normal.  Oral mucosa moist without lesions.  Hearing grossly intact.   Neck: Supple, with full range of motion. Trachea midline.  No gross JVD appreciated.  Respiratory:  Normal respiratory effort. Diminished to auscultation, bilaterally without rales or wheezes or rhonchi.  Cardiovascular: Normal rate, regular rhythm with normal S1/S2 without murmurs.  No lower extremity edema.   Abdomen: Soft, non-tender, non-distended with normal bowel sounds.  Musculoskeletal: No joint swelling or tenderness. Normal tone. No abnormal movements.   Skin: Warm and dry. No rashes or lesions.  Neurologic:  No focal sensory/motor deficits in the upper and lower extremities. Cranial nerves:  grossly non-focal 2-12.     Psychiatric: Alert and oriented, normal insight and thought content.   Capillary Refill: Brisk,< 3 seconds.  Peripheral Pulses: +2 palpable, equal bilaterally.       Medications Prior to Admission:   Prior to Admission Medications   Prescriptions Last Dose Informant Patient Reported? Taking?   etodolac (LODINE) 300 MG capsule   No No   Sig: Take 1 capsule by mouth in the morning and 1 capsule at noon and 1 capsule in the evening.      Facility-Administered Medications: None     Allergies:  Compazine [prochlorperazine]    Past Medical, Family, Social, Surgical Hx

## 2025-06-21 NOTE — PROCEDURES
PROCEDURE NOTE  Date: 6/21/2025   Name: Esdras Marie  YOB: 1955    Procedures  12 lead EKG completed. Results handed to Carrie HARPER.

## 2025-06-21 NOTE — CONSULTS
[place] [facility] Pulmonary, [facility] and Critical Care Medicine      Patient - Esdras Marie   MRN -  [id number]   [facility] # - [id number]   - 1955      Date of Admission -  2025  5:25 PM  Date of evaluation -  2025  Room - 7--A   Hospital Day - 0  Consulting - Nitin Pack MD Primary Care Physician - No primary care provider on file.     Problem List      Active Hospital Problems    Diagnosis Date Noted    Lung mass [R91.8] 2025    Streptococcal sore throat [J02.0] 2025    Tobacco abuse [Z72.0] 2025    Supraglottitis without airway obstruction [J04.30] 2025    Adult supraglottitis [J04.30] 2025     Reason for Consult    lung mass.  HPI   History Obtained From: Patient and electronic medical record.    Esdras Marie is a 70 y.o. male  was initially admitted under hospitalist service. He has past medical history of ruptured disk and tobacco use. He presented to [facility] with sore throat and difficulty with swallowing. ENT consulted for supraglottitis. He was incidentally found to have a partially visualized right upper lobe mass measuring at least 7.5 x 4.7 cm, obstructing the right upper lobe bronchus with postobstructive atelectasis on CT Neck and thus chest x-ray was obtained and revealed right upper lung opacification with postobstructive atelectasis.    Pulmonary medicine was consulted for further management of lung mass.    He was ever seen by a pulmonologist in the past:No  Prior history of lung nodule/Lung mass:No  He was ever diagnosed with lung cancer:No    History of tobacco smoking:Yes  Amount of tobacco smokin.0-2.0 PPD.  Years of tobacco smokin-60 year.                                    Quit smoking: No        Current smoker:Yes  Amount of current tobacco smokin.75 PPD    He admits to cough - white foamy. About 2 weeks ago had hemoptysis. He reports pink tinged mucus or strands in mucus. He had a sore throat at the time. Occurred for    Net -300 ml     No intake/output data recorded.   Patient Vitals for the past 96 hrs (Last 3 readings):   Weight   06/20/25 1728 59 kg (130 lb)       Exam   General Appearance: moderately built, moderately nourished in no acute distress on room air  HEENT: Normal, Head is normocephalic, atraumatic. Oropharynx is clear and moist.  PERRL  Neck - Supple, No JVD present. No tracheal deviation.  Lungs - Bilateral air entry present. Decreased breath sounds on  bilateral side of chest. No wheezes. No rales.  Cardiovascular - Heart sounds are normal.  Regular rhythm normal rate without murmur, gallop or rub.  Abdomen - Soft, nontender, nondistended, no masses or organomegaly  Neurologic - Awake, alert, able to follow simple commands. There are no focal motor or sensory deficits.  Extremities - No edema.  Musculoskeletal: Normal range of motion. Patient exhibits no tenderness.   Psychiatric: Patient has angry affect.   Skin - Warm and dry  Labs  - Old records and notes have been reviewed in [facility]   ABG  No results found for: \"PH\", \"PO2\", \"PCO2\", \"HCO3\", \"O2SAT\"  No results found for: \"IFIO2\", \"MODE\", \"SETTIDVOL\", \"SETPEEP\"  CBC  Recent Labs     06/20/25  1824 06/21/25  0757   WBC 14.2* 11.6*   RBC 4.23* 3.93*   HGB 13.0* 12.2*   HCT 40.4* 38.3*   MCV 95.5* 97.5*   MCH 30.7 31.0   MCHC 32.2 31.9*    321   MPV 9.9 9.7      BMP  Recent Labs     06/20/25  1824 06/21/25  0757    140   K 3.9 4.1    106   CO2 24 18*   BUN 17 16   CREATININE 0.8 0.6*   GLUCOSE 101 78   MG  --  2.2   PHOS  --  2.8   CALCIUM 9.4 9.1     LFT  No results for input(s): \"AST\", \"ALT\", \"BILITOT\", \"ALKPHOS\", \"AMYLASE\", \"LIPASE\" in the last 72 hours.    Invalid input(s): \"ALB\"  TROP  No results found for: \"TROPONINT\"  BNP  No results for input(s): \"BNP\" in the last 72 hours.  Lactic Acid  Recent Labs     06/20/25  2121   LACTA 1.2     INR  No results for input(s): \"INR\", \"PROTIME\" in the last 72 hours.  PTT  No results for input(s):

## 2025-06-21 NOTE — ED NOTES
Spoke to Anni on 7K who approved patient transfer to 7K-06. Patient transported upstairs in stable condition.

## 2025-06-22 PROBLEM — J02.9 SORE THROAT: Status: ACTIVE | Noted: 2025-06-22

## 2025-06-22 LAB
ANION GAP SERPL CALC-SCNC: 15 MEQ/L (ref 8–16)
BASOPHILS ABSOLUTE: 0 THOU/MM3 (ref 0–0.1)
BASOPHILS NFR BLD AUTO: 0.3 %
BUN SERPL-MCNC: 19 MG/DL (ref 8–23)
CALCIUM SERPL-MCNC: 8.7 MG/DL (ref 8.8–10.2)
CHLORIDE SERPL-SCNC: 108 MEQ/L (ref 98–111)
CO2 SERPL-SCNC: 20 MEQ/L (ref 22–29)
CREAT SERPL-MCNC: 0.6 MG/DL (ref 0.7–1.2)
DEPRECATED RDW RBC AUTO: 45.8 FL (ref 35–45)
EOSINOPHIL NFR BLD AUTO: 0.4 %
EOSINOPHILS ABSOLUTE: 0 THOU/MM3 (ref 0–0.4)
ERYTHROCYTE [DISTWIDTH] IN BLOOD BY AUTOMATED COUNT: 13.1 % (ref 11.5–14.5)
GFR SERPL CREATININE-BSD FRML MDRD: > 90 ML/MIN/1.73M2
GLUCOSE SERPL-MCNC: 86 MG/DL (ref 74–109)
HCT VFR BLD AUTO: 35.2 % (ref 42–52)
HGB BLD-MCNC: 11.4 GM/DL (ref 14–18)
IMM GRANULOCYTES # BLD AUTO: 0.04 THOU/MM3 (ref 0–0.07)
IMM GRANULOCYTES NFR BLD AUTO: 0.4 %
L PNEUMO1 AG UR QL IA.RAPID: NEGATIVE
LYMPHOCYTES ABSOLUTE: 0.8 THOU/MM3 (ref 1–4.8)
LYMPHOCYTES NFR BLD AUTO: 7.9 %
MAGNESIUM SERPL-MCNC: 2 MG/DL (ref 1.6–2.6)
MCH RBC QN AUTO: 30.6 PG (ref 26–33)
MCHC RBC AUTO-ENTMCNC: 32.4 GM/DL (ref 32.2–35.5)
MCV RBC AUTO: 94.6 FL (ref 80–94)
MONOCYTES ABSOLUTE: 1.3 THOU/MM3 (ref 0.4–1.3)
MONOCYTES NFR BLD AUTO: 12.3 %
NEUTROPHILS ABSOLUTE: 8.1 THOU/MM3 (ref 1.8–7.7)
NEUTROPHILS NFR BLD AUTO: 78.7 %
NRBC BLD AUTO-RTO: 0 /100 WBC
PLATELET # BLD AUTO: 320 THOU/MM3 (ref 130–400)
PMV BLD AUTO: 10 FL (ref 9.4–12.4)
POTASSIUM SERPL-SCNC: 3.9 MEQ/L (ref 3.5–5.2)
RBC # BLD AUTO: 3.72 MILL/MM3 (ref 4.7–6.1)
SODIUM SERPL-SCNC: 143 MEQ/L (ref 135–145)
WBC # BLD AUTO: 10.3 THOU/MM3 (ref 4.8–10.8)

## 2025-06-22 PROCEDURE — 6360000002 HC RX W HCPCS

## 2025-06-22 PROCEDURE — 80048 BASIC METABOLIC PNL TOTAL CA: CPT

## 2025-06-22 PROCEDURE — 99232 SBSQ HOSP IP/OBS MODERATE 35: CPT | Performed by: STUDENT IN AN ORGANIZED HEALTH CARE EDUCATION/TRAINING PROGRAM

## 2025-06-22 PROCEDURE — 2580000003 HC RX 258: Performed by: INTERNAL MEDICINE

## 2025-06-22 PROCEDURE — 96366 THER/PROPH/DIAG IV INF ADDON: CPT

## 2025-06-22 PROCEDURE — 6360000002 HC RX W HCPCS: Performed by: INTERNAL MEDICINE

## 2025-06-22 PROCEDURE — 96376 TX/PRO/DX INJ SAME DRUG ADON: CPT

## 2025-06-22 PROCEDURE — 99232 SBSQ HOSP IP/OBS MODERATE 35: CPT | Performed by: INTERNAL MEDICINE

## 2025-06-22 PROCEDURE — 36415 COLL VENOUS BLD VENIPUNCTURE: CPT

## 2025-06-22 PROCEDURE — 2500000003 HC RX 250 WO HCPCS

## 2025-06-22 PROCEDURE — 2500000003 HC RX 250 WO HCPCS: Performed by: STUDENT IN AN ORGANIZED HEALTH CARE EDUCATION/TRAINING PROGRAM

## 2025-06-22 PROCEDURE — 96368 THER/DIAG CONCURRENT INF: CPT

## 2025-06-22 PROCEDURE — 87449 NOS EACH ORGANISM AG IA: CPT

## 2025-06-22 PROCEDURE — 6360000002 HC RX W HCPCS: Performed by: STUDENT IN AN ORGANIZED HEALTH CARE EDUCATION/TRAINING PROGRAM

## 2025-06-22 PROCEDURE — 85025 COMPLETE CBC W/AUTO DIFF WBC: CPT

## 2025-06-22 PROCEDURE — 1200000003 HC TELEMETRY R&B

## 2025-06-22 PROCEDURE — 99232 SBSQ HOSP IP/OBS MODERATE 35: CPT

## 2025-06-22 PROCEDURE — 83735 ASSAY OF MAGNESIUM: CPT

## 2025-06-22 RX ORDER — METRONIDAZOLE 500 MG/100ML
500 INJECTION, SOLUTION INTRAVENOUS EVERY 12 HOURS
Status: DISCONTINUED | OUTPATIENT
Start: 2025-06-22 | End: 2025-06-24

## 2025-06-22 RX ORDER — DEXAMETHASONE SODIUM PHOSPHATE 4 MG/ML
4 INJECTION, SOLUTION INTRA-ARTICULAR; INTRALESIONAL; INTRAMUSCULAR; INTRAVENOUS; SOFT TISSUE EVERY 6 HOURS
Status: COMPLETED | OUTPATIENT
Start: 2025-06-22 | End: 2025-06-23

## 2025-06-22 RX ADMIN — METRONIDAZOLE 500 MG: 500 INJECTION, SOLUTION INTRAVENOUS at 16:32

## 2025-06-22 RX ADMIN — MORPHINE SULFATE 4 MG: 4 INJECTION, SOLUTION INTRAMUSCULAR; INTRAVENOUS at 01:26

## 2025-06-22 RX ADMIN — CEFEPIME 2000 MG: 2 INJECTION, POWDER, FOR SOLUTION INTRAVENOUS at 03:27

## 2025-06-22 RX ADMIN — METRONIDAZOLE 500 MG: 500 INJECTION, SOLUTION INTRAVENOUS at 03:26

## 2025-06-22 RX ADMIN — WATER 2000 MG: 1 INJECTION INTRAMUSCULAR; INTRAVENOUS; SUBCUTANEOUS at 09:23

## 2025-06-22 RX ADMIN — DEXAMETHASONE SODIUM PHOSPHATE 4 MG: 4 INJECTION, SOLUTION INTRA-ARTICULAR; INTRALESIONAL; INTRAMUSCULAR; INTRAVENOUS; SOFT TISSUE at 22:53

## 2025-06-22 RX ADMIN — SODIUM CHLORIDE: 0.9 INJECTION, SOLUTION INTRAVENOUS at 03:24

## 2025-06-22 RX ADMIN — SODIUM CHLORIDE, PRESERVATIVE FREE 10 ML: 5 INJECTION INTRAVENOUS at 20:25

## 2025-06-22 RX ADMIN — MORPHINE SULFATE 4 MG: 4 INJECTION, SOLUTION INTRAMUSCULAR; INTRAVENOUS at 20:25

## 2025-06-22 RX ADMIN — MORPHINE SULFATE 4 MG: 4 INJECTION, SOLUTION INTRAMUSCULAR; INTRAVENOUS at 22:54

## 2025-06-22 ASSESSMENT — PAIN DESCRIPTION - LOCATION
LOCATION: THROAT

## 2025-06-22 ASSESSMENT — PAIN DESCRIPTION - ORIENTATION
ORIENTATION: INNER

## 2025-06-22 ASSESSMENT — PAIN - FUNCTIONAL ASSESSMENT
PAIN_FUNCTIONAL_ASSESSMENT: ACTIVITIES ARE NOT PREVENTED
PAIN_FUNCTIONAL_ASSESSMENT: ACTIVITIES ARE NOT PREVENTED
PAIN_FUNCTIONAL_ASSESSMENT: PREVENTS OR INTERFERES SOME ACTIVE ACTIVITIES AND ADLS

## 2025-06-22 ASSESSMENT — PAIN SCALES - GENERAL
PAINLEVEL_OUTOF10: 7
PAINLEVEL_OUTOF10: 4
PAINLEVEL_OUTOF10: 5
PAINLEVEL_OUTOF10: 7

## 2025-06-22 ASSESSMENT — PAIN DESCRIPTION - DESCRIPTORS
DESCRIPTORS: SORE;SHARP

## 2025-06-22 ASSESSMENT — PAIN DESCRIPTION - PAIN TYPE: TYPE: ACUTE PAIN

## 2025-06-22 ASSESSMENT — PAIN DESCRIPTION - ONSET: ONSET: GRADUAL

## 2025-06-22 ASSESSMENT — PAIN DESCRIPTION - FREQUENCY: FREQUENCY: CONTINUOUS

## 2025-06-22 NOTE — PROGRESS NOTES
[facility] Infectious Disease         Progress Note      Esdras Marie  MEDICAL RECORD NUMBER:  [id number]  AGE: 70 y.o.   GENDER: male  : 1955  EPISODE DATE:  2025      Assessment:     Patient is a 70-year-old male who I am following for supraglottic swelling and right upper lobe mass.    CT imaging of the chest reviewed with evidence of right upper lobe collapse and a right hilar/mediastinal mass.  There is also evidence of local lymphadenopathy.  Pulmonology following, this patient will likely require more aggressive sampling.  I have discontinued cefepime.  The typical agents involved in either pneumonia or glottic inflammation would include Staphylococcus, strep (group A or pneumoniae), haemophilus.  Less likely to be anaerobic involvement though I have added metronidazole.  If there is evidence of clinical deterioration, would consider addition of anti-MRSA agent with vancomycin.    Pulmonology following in setting of mediastinal/hilar mass  Questionable postobstructive pneumonia as well as supraglottic swelling   Transition to ceftriaxone 2 g daily   Continue metronidazole 500 twice daily   If progression/deterioration, start vancomycin      Subjective:     Chief Complaint   Patient presents with    Pharyngitis         No acute events overnight, reviewed CT imaging of the chest with a fairly large mass lesion which is ill-defined in the right chest.  There is also evidence of local lymphadenopathy.  Continues to have complaints of difficulty swallowing.      Patient Active Problem List   Diagnosis Code    Intractable low back pain M54.59    Back pain M54.9    Adult supraglottitis J04.30    Lung mass R91.8    Streptococcal sore throat J02.0    Tobacco use Z72.0    Supraglottitis without airway obstruction J04.30    Hemoptysis R04.2    History of incarceration Z78.9             PAST MEDICAL HISTORY        Diagnosis Date    Ruptured disk        PAST SURGICAL HISTORY    Past Surgical

## 2025-06-22 NOTE — PROGRESS NOTES
[place] for Pulmonary, Sleep and Critical Care Medicine      Patient - Esdras Marie   MRN -  [id number]   [facility] # - [id number]   - 1955      Date of Admission -  2025  5:25 PM  Date of evaluation -  2025  Room - --A   Hospital Day - 0  Consulting - Nitin Pack MD Primary Care Physician - No primary care provider on file.     Problem List      Active Hospital Problems    Diagnosis Date Noted    Lung mass [R91.8] 2025    Streptococcal sore throat [J02.0] 2025    Tobacco use [Z72.0] 2025    Supraglottitis without airway obstruction [J04.30] 2025    Hemoptysis [R04.2] 2025    History of incarceration [Z78.9] 2025    Adult supraglottitis [J04.30] 2025     Reason for Consult    Lung mass  HPI   History Obtained From: Patient and electronic medical record.    Esdras Marie is a 70 y.o. male  was initially admitted under hospitalist service. He has past medical history of ruptured disk and tobacco use. He presented to [facility] with sore throat and difficulty with swallowing. ENT consulted for supraglottitis. He was incidentally found to have a partially visualized right upper lobe mass measuring at least 7.5 x 4.7 cm, obstructing the right upper lobe bronchus with postobstructive atelectasis on CT Neck and thus chest x-ray was obtained and revealed right upper lung opacification with postobstructive atelectasis.    Pulmonary medicine was consulted for further management of lung mass.    He was ever seen by a pulmonologist in the past:No  Prior history of lung nodule/Lung mass:No  He was ever diagnosed with lung cancer:No    History of tobacco smoking:Yes  Amount of tobacco smokin.0-2.0 PPD.  Years of tobacco smokin-60 year.                                    Quit smoking: No        Current smoker:Yes  Amount of current tobacco smokin.75 PPD    He admits to cough - white foamy. About 2 weeks ago had hemoptysis. He reports pink tinged  **OR** ondansetron, polyethylene glycol, acetaminophen **OR** acetaminophen, morphine **OR** morphine, ipratropium 0.5 mg-albuterol 2.5 mg  IV Drips/Infusions   sodium chloride 25 mL (06/21/25 0422)     Home Medications  Medications Prior to Admission: ibuprofen (ADVIL;MOTRIN) 200 MG tablet, Take 1 tablet by mouth every 6 hours as needed for Pain  etodolac (LODINE) 300 MG capsule, Take 1 capsule by mouth in the morning and 1 capsule at noon and 1 capsule in the evening. (Patient not taking: Reported on 6/21/2025)  Diet    ADULT DIET; Dysphagia - Pureed  Allergies    Compazine [prochlorperazine]  Family History          Family history unknown: Yes     Sleep History    Never diagnosed with sleep apnea in the past  Social History     Social History     Tobacco Use    Smoking status: Every Day     Current packs/day: 1.00     Average packs/day: 1 pack/day for 40.0 years (40.0 ttl pk-yrs)     Types: Cigarettes    Smokeless tobacco: Current   Vaping Use    Vaping status: Never Used   Substance Use Topics    Alcohol use: Yes     Comment: states once in a while    Drug use: Not Currently     Types: IV, Opiates , Marijuana (Weed)     Vitals     height is 1.727 m (5' 8\") and weight is 59 kg (130 lb). His oral temperature is 98.9 °F (37.2 °C). His blood pressure is 129/74 and his pulse is 103 (abnormal). His respiration is 20 and oxygen saturation is 91%.   Body mass index is 19.77 kg/m².    SUPPLEMENTAL O2:         I/O      Intake/Output Summary (Last 24 hours) at 6/22/2025 0918  Last data filed at 6/22/2025 0600  Gross per 24 hour   Intake 834.86 ml   Output 400 ml   Net 434.86 ml     I/O last 3 completed shifts:  In: 834.9 [P.O.:300; I.V.:337; IV Piggyback:197.9]  Out: 700 [Urine:700]   Patient Vitals for the past 96 hrs (Last 3 readings):   Weight   06/20/25 1728 59 kg (130 lb)       Exam   Physical Exam   Constitutional: No distress on room air. Patient appears moderately built and moderately nourished.   Head:

## 2025-06-22 NOTE — PROGRESS NOTES
[facility]    Hospitalist PROGRESS NOTE dictated by Nitin Pack MD on 2025    Patient ID: Esdras Marie is 70 y.o. and presently in room 7A  : 1955 MRN: [id number]    Admit date: 2025  Primary Care Physician: No primary care provider on file.   No care team member to display  Tel: None  Admitting Physician: No admitting provider for patient encounter.   Code Status:  Full Code    Esdras Marie is a 70 y.o.  male who presented with Pharyngitis    Room:   Admit date: 2025    Chief Complaint:  sore throat     History Of Present Illness:  Esdras Marie is a 70 y.o. male with PMHx of tobacco abuse who presents to [facility] with sore throat.  Patient states that for the last 2 days he has been having a severe sore throat. He states that he has been not able to swallow well and the pain is a 5/10, but pain worsens to 10/10 when trying to swallow. Denies any sick contacts. Denies nausea, vomiting, diarrhea, chest pain and shortness of breath.   -----    2025: Patient states has been having sore throat since Wednesday and difficulty swallowing solids and liquids.  Patient has some cough secondary to postnasal drip.  Denies chest pain, shortness of breath, diarrhea, fevers or chills.  Patient smokes about half a pack of cigarettes a day and presently does not wish nicotine patch.  Daughter at bedside.    Strep screen positive. Patient initially received vancomycin and Rocephin.  Given concern for post obstructive pneumonia and malignancy starting cefepime pending ID evaluation.  However, of note the procalcitonin level is only 0.06.    Per ENT: Mucosal changes in the arytenoid and aryepiglottic folds suspicious for malignancy. Epiglottis is normal.     Calcium 1.0.  CO2 is 18.  WBC 11.6 and hemoglobin 12.2.  ESR 56 and CRP 12.7  ----------    2025: Patient with sore throat, positive  \"HA1CC\"    Lipids:  No results found for: \"CHOLESTEROL\", \"HDL\", \"LDL\", \"VLDL\"      CRP:   Lab Results   Component Value Date    CRP 12.70 (H) 06/20/2025     ESR:   Lab Results   Component Value Date    SEDRATE 56 (H) 06/20/2025       Folate and B12: No results found for: \"[id number]\", No results found for: \"FOLATE\"    Blood Culture:   Recent Labs     06/20/25  2121   BC No growth 24 hours.  No growth 24 hours.       GRAM STAIN  Recent Labs     06/21/25  0405   LABGRAM Quality of sputum specimen: Specimen acceptable. Many segmented neutrophils observed. Rare epithelial cells observed. Moderate gram negative bacilli. Few gram positive cocci occurring singly and in pairs.       Resp Culture Brief : No results found for: \"CULTRESP\"    Body Fluid : No results found for: \"BFCX\"    MRSA : No results found for: \"MRSAC\"    Urine Culture Brief : No results found for: \"LABURIN\"     Organism Brief : No results found for: \"ORG\"        This note was dictated using M*Modal Fluency Direct so please excuse any grammatical or syntax errors as no guarantees can be provided that every mistake has been identified and corrected by editing.      Electronically signed by Nitin Pack MD on 6/22/2025 at 2:51 PM

## 2025-06-22 NOTE — PLAN OF CARE
Problem: Discharge Planning  Goal: Discharge to home or other facility with appropriate resources  Outcome: Progressing  Flowsheets (Taken 6/22/2025 0152)  Discharge to home or other facility with appropriate resources:   Identify barriers to discharge with patient and caregiver   Arrange for needed discharge resources and transportation as appropriate   Identify discharge learning needs (meds, wound care, etc)     Problem: Pain  Goal: Verbalizes/displays adequate comfort level or baseline comfort level  Outcome: Progressing  Flowsheets (Taken 6/22/2025 0152)  Verbalizes/displays adequate comfort level or baseline comfort level:   Encourage patient to monitor pain and request assistance   Assess pain using appropriate pain scale   Administer analgesics based on type and severity of pain and evaluate response   Implement non-pharmacological measures as appropriate and evaluate response     Problem: ABCDS Injury Assessment  Goal: Absence of physical injury  Outcome: Progressing  Flowsheets (Taken 6/22/2025 0152)  Absence of Physical Injury: Implement safety measures based on patient assessment     Problem: Coping  Goal: Pt/Family able to verbalize concerns and demonstrate effective coping strategies  Description: INTERVENTIONS:  1. Assist patient/family to identify coping skills, available support systems and cultural and spiritual values  2. Provide emotional support, including active listening and acknowledgement of concerns of patient and caregivers  3. Reduce environmental stimuli, as able  4. Instruct patient/family in relaxation techniques, as appropriate  5. Assess for spiritual pain/suffering and initiate Spiritual Care, Psychosocial Clinical Specialist consults as needed  Outcome: Progressing  Flowsheets (Taken 6/22/2025 0152)  Patient/family able to verbalize anxieties, fears, and concerns, and demonstrate effective coping:   Provide emotional support, including active listening and acknowledgement of

## 2025-06-23 ENCOUNTER — ANESTHESIA (OUTPATIENT)
Dept: ENDOSCOPY | Age: 70
DRG: 152 | End: 2025-06-23
Payer: MEDICARE

## 2025-06-23 ENCOUNTER — APPOINTMENT (OUTPATIENT)
Dept: ENDOSCOPY | Age: 70
DRG: 152 | End: 2025-06-23
Attending: INTERNAL MEDICINE
Payer: MEDICARE

## 2025-06-23 ENCOUNTER — APPOINTMENT (OUTPATIENT)
Dept: GENERAL RADIOLOGY | Age: 70
DRG: 152 | End: 2025-06-23
Payer: MEDICARE

## 2025-06-23 ENCOUNTER — ANESTHESIA EVENT (OUTPATIENT)
Dept: ENDOSCOPY | Age: 70
DRG: 152 | End: 2025-06-23
Payer: MEDICARE

## 2025-06-23 PROBLEM — R59.0 MEDIASTINAL LYMPHADENOPATHY: Status: ACTIVE | Noted: 2025-06-23

## 2025-06-23 LAB
ANION GAP SERPL CALC-SCNC: 15 MEQ/L (ref 8–16)
BASOPHILS ABSOLUTE: 0 THOU/MM3 (ref 0–0.1)
BASOPHILS NFR BLD AUTO: 0.2 %
BUN SERPL-MCNC: 18 MG/DL (ref 8–23)
CALCIUM SERPL-MCNC: 9.4 MG/DL (ref 8.8–10.2)
CHLORIDE SERPL-SCNC: 100 MEQ/L (ref 98–111)
CO2 SERPL-SCNC: 22 MEQ/L (ref 22–29)
CREAT SERPL-MCNC: 0.6 MG/DL (ref 0.7–1.2)
DEPRECATED RDW RBC AUTO: 44.6 FL (ref 35–45)
EOSINOPHIL NFR BLD AUTO: 0.1 %
EOSINOPHILS ABSOLUTE: 0 THOU/MM3 (ref 0–0.4)
ERYTHROCYTE [DISTWIDTH] IN BLOOD BY AUTOMATED COUNT: 13 % (ref 11.5–14.5)
GFR SERPL CREATININE-BSD FRML MDRD: > 90 ML/MIN/1.73M2
GLUCOSE SERPL-MCNC: 114 MG/DL (ref 74–109)
HCT VFR BLD AUTO: 39.6 % (ref 42–52)
HGB BLD-MCNC: 13 GM/DL (ref 14–18)
IMM GRANULOCYTES # BLD AUTO: 0.03 THOU/MM3 (ref 0–0.07)
IMM GRANULOCYTES NFR BLD AUTO: 0.3 %
LYMPHOCYTES ABSOLUTE: 0.6 THOU/MM3 (ref 1–4.8)
LYMPHOCYTES NFR BLD AUTO: 6.2 %
MAGNESIUM SERPL-MCNC: 2.2 MG/DL (ref 1.6–2.6)
MCH RBC QN AUTO: 30.8 PG (ref 26–33)
MCHC RBC AUTO-ENTMCNC: 32.8 GM/DL (ref 32.2–35.5)
MCV RBC AUTO: 93.8 FL (ref 80–94)
MONOCYTES ABSOLUTE: 0.2 THOU/MM3 (ref 0.4–1.3)
MONOCYTES NFR BLD AUTO: 1.9 %
NEUTROPHILS ABSOLUTE: 8.3 THOU/MM3 (ref 1.8–7.7)
NEUTROPHILS NFR BLD AUTO: 91.3 %
NRBC BLD AUTO-RTO: 0 /100 WBC
PLATELET # BLD AUTO: 372 THOU/MM3 (ref 130–400)
PMV BLD AUTO: 10 FL (ref 9.4–12.4)
POTASSIUM SERPL-SCNC: 4.3 MEQ/L (ref 3.5–5.2)
QUANTI TB1 MINUS NIL: 0.01 IU/ML (ref 0–0.34)
QUANTI TB2 MINUS NIL: 0 IU/ML (ref 0–0.34)
QUANTIFERON MITOGEN MINUS NIL: 6.91 IU/ML
QUANTIFERON NIL: 0.06 IU/ML
QUANTIFERON TB INTERPRETATION: NEGATIVE IU/ML
RBC # BLD AUTO: 4.22 MILL/MM3 (ref 4.7–6.1)
SODIUM SERPL-SCNC: 137 MEQ/L (ref 135–145)
WBC # BLD AUTO: 9.1 THOU/MM3 (ref 4.8–10.8)

## 2025-06-23 PROCEDURE — 88305 TISSUE EXAM BY PATHOLOGIST: CPT

## 2025-06-23 PROCEDURE — 7100000000 HC PACU RECOVERY - FIRST 15 MIN: Performed by: INTERNAL MEDICINE

## 2025-06-23 PROCEDURE — 3609020000 HC BRONCHOSCOPY W/EBUS FNA 3/> NODE: Performed by: INTERNAL MEDICINE

## 2025-06-23 PROCEDURE — 2500000003 HC RX 250 WO HCPCS

## 2025-06-23 PROCEDURE — 2500000003 HC RX 250 WO HCPCS: Performed by: NURSE ANESTHETIST, CERTIFIED REGISTERED

## 2025-06-23 PROCEDURE — 07D78ZX EXTRACTION OF THORAX LYMPHATIC, VIA NATURAL OR ARTIFICIAL OPENING ENDOSCOPIC, DIAGNOSTIC: ICD-10-PCS | Performed by: INTERNAL MEDICINE

## 2025-06-23 PROCEDURE — 80048 BASIC METABOLIC PNL TOTAL CA: CPT

## 2025-06-23 PROCEDURE — 99232 SBSQ HOSP IP/OBS MODERATE 35: CPT | Performed by: INTERNAL MEDICINE

## 2025-06-23 PROCEDURE — 7100000001 HC PACU RECOVERY - ADDTL 15 MIN: Performed by: INTERNAL MEDICINE

## 2025-06-23 PROCEDURE — 1200000000 HC SEMI PRIVATE

## 2025-06-23 PROCEDURE — 0BB48ZX EXCISION OF RIGHT UPPER LOBE BRONCHUS, VIA NATURAL OR ARTIFICIAL OPENING ENDOSCOPIC, DIAGNOSTIC: ICD-10-PCS | Performed by: INTERNAL MEDICINE

## 2025-06-23 PROCEDURE — 87102 FUNGUS ISOLATION CULTURE: CPT

## 2025-06-23 PROCEDURE — 6360000002 HC RX W HCPCS

## 2025-06-23 PROCEDURE — 87077 CULTURE AEROBIC IDENTIFY: CPT

## 2025-06-23 PROCEDURE — 3700000000 HC ANESTHESIA ATTENDED CARE: Performed by: INTERNAL MEDICINE

## 2025-06-23 PROCEDURE — 88341 IMHCHEM/IMCYTCHM EA ADD ANTB: CPT

## 2025-06-23 PROCEDURE — 3609010800 HC BRONCHOSCOPY ALVEOLAR LAVAGE: Performed by: INTERNAL MEDICINE

## 2025-06-23 PROCEDURE — 0B9C8ZX DRAINAGE OF RIGHT UPPER LUNG LOBE, VIA NATURAL OR ARTIFICIAL OPENING ENDOSCOPIC, DIAGNOSTIC: ICD-10-PCS | Performed by: INTERNAL MEDICINE

## 2025-06-23 PROCEDURE — 99232 SBSQ HOSP IP/OBS MODERATE 35: CPT | Performed by: STUDENT IN AN ORGANIZED HEALTH CARE EDUCATION/TRAINING PROGRAM

## 2025-06-23 PROCEDURE — 6360000002 HC RX W HCPCS: Performed by: NURSE ANESTHETIST, CERTIFIED REGISTERED

## 2025-06-23 PROCEDURE — 2500000003 HC RX 250 WO HCPCS: Performed by: STUDENT IN AN ORGANIZED HEALTH CARE EDUCATION/TRAINING PROGRAM

## 2025-06-23 PROCEDURE — 88173 CYTOPATH EVAL FNA REPORT: CPT

## 2025-06-23 PROCEDURE — 2580000003 HC RX 258: Performed by: NURSE ANESTHETIST, CERTIFIED REGISTERED

## 2025-06-23 PROCEDURE — 87116 MYCOBACTERIA CULTURE: CPT

## 2025-06-23 PROCEDURE — 0BC48ZZ EXTIRPATION OF MATTER FROM RIGHT UPPER LOBE BRONCHUS, VIA NATURAL OR ARTIFICIAL OPENING ENDOSCOPIC: ICD-10-PCS | Performed by: INTERNAL MEDICINE

## 2025-06-23 PROCEDURE — 6370000000 HC RX 637 (ALT 250 FOR IP)

## 2025-06-23 PROCEDURE — 36415 COLL VENOUS BLD VENIPUNCTURE: CPT

## 2025-06-23 PROCEDURE — 88112 CYTOPATH CELL ENHANCE TECH: CPT

## 2025-06-23 PROCEDURE — 85025 COMPLETE CBC W/AUTO DIFF WBC: CPT

## 2025-06-23 PROCEDURE — 83735 ASSAY OF MAGNESIUM: CPT

## 2025-06-23 PROCEDURE — 6360000002 HC RX W HCPCS: Performed by: STUDENT IN AN ORGANIZED HEALTH CARE EDUCATION/TRAINING PROGRAM

## 2025-06-23 PROCEDURE — 88342 IMHCHEM/IMCYTCHM 1ST ANTB: CPT

## 2025-06-23 PROCEDURE — 99233 SBSQ HOSP IP/OBS HIGH 50: CPT | Performed by: INTERNAL MEDICINE

## 2025-06-23 PROCEDURE — 87070 CULTURE OTHR SPECIMN AEROBIC: CPT

## 2025-06-23 PROCEDURE — 87205 SMEAR GRAM STAIN: CPT

## 2025-06-23 PROCEDURE — 3700000001 HC ADD 15 MINUTES (ANESTHESIA): Performed by: INTERNAL MEDICINE

## 2025-06-23 RX ORDER — SODIUM CHLORIDE 0.9 % (FLUSH) 0.9 %
5-40 SYRINGE (ML) INJECTION PRN
Status: DISCONTINUED | OUTPATIENT
Start: 2025-06-23 | End: 2025-06-23 | Stop reason: HOSPADM

## 2025-06-23 RX ORDER — FENTANYL CITRATE 50 UG/ML
50 INJECTION, SOLUTION INTRAMUSCULAR; INTRAVENOUS EVERY 5 MIN PRN
Status: DISCONTINUED | OUTPATIENT
Start: 2025-06-23 | End: 2025-06-23 | Stop reason: HOSPADM

## 2025-06-23 RX ORDER — LIDOCAINE HYDROCHLORIDE 20 MG/ML
INJECTION, SOLUTION EPIDURAL; INFILTRATION; INTRACAUDAL; PERINEURAL
Status: DISCONTINUED | OUTPATIENT
Start: 2025-06-23 | End: 2025-06-23 | Stop reason: SDUPTHER

## 2025-06-23 RX ORDER — PROPOFOL 10 MG/ML
INJECTION, EMULSION INTRAVENOUS
Status: DISCONTINUED | OUTPATIENT
Start: 2025-06-23 | End: 2025-06-23 | Stop reason: SDUPTHER

## 2025-06-23 RX ORDER — SODIUM CHLORIDE 9 MG/ML
INJECTION, SOLUTION INTRAVENOUS PRN
Status: DISCONTINUED | OUTPATIENT
Start: 2025-06-23 | End: 2025-06-24 | Stop reason: HOSPADM

## 2025-06-23 RX ORDER — IPRATROPIUM BROMIDE AND ALBUTEROL SULFATE 2.5; .5 MG/3ML; MG/3ML
1 SOLUTION RESPIRATORY (INHALATION) ONCE
Status: COMPLETED | OUTPATIENT
Start: 2025-06-23 | End: 2025-06-23

## 2025-06-23 RX ORDER — ROCURONIUM BROMIDE 10 MG/ML
INJECTION, SOLUTION INTRAVENOUS
Status: DISCONTINUED | OUTPATIENT
Start: 2025-06-23 | End: 2025-06-23 | Stop reason: SDUPTHER

## 2025-06-23 RX ORDER — SODIUM CHLORIDE 0.9 % (FLUSH) 0.9 %
5-40 SYRINGE (ML) INJECTION EVERY 12 HOURS SCHEDULED
Status: DISCONTINUED | OUTPATIENT
Start: 2025-06-23 | End: 2025-06-23 | Stop reason: HOSPADM

## 2025-06-23 RX ORDER — SODIUM CHLORIDE 0.9 % (FLUSH) 0.9 %
5-40 SYRINGE (ML) INJECTION EVERY 12 HOURS SCHEDULED
Status: DISCONTINUED | OUTPATIENT
Start: 2025-06-23 | End: 2025-06-24 | Stop reason: HOSPADM

## 2025-06-23 RX ORDER — SODIUM CHLORIDE 9 MG/ML
INJECTION, SOLUTION INTRAVENOUS PRN
Status: DISCONTINUED | OUTPATIENT
Start: 2025-06-23 | End: 2025-06-23 | Stop reason: HOSPADM

## 2025-06-23 RX ORDER — DEXAMETHASONE SODIUM PHOSPHATE 4 MG/ML
INJECTION, SOLUTION INTRA-ARTICULAR; INTRALESIONAL; INTRAMUSCULAR; INTRAVENOUS; SOFT TISSUE
Status: DISCONTINUED | OUTPATIENT
Start: 2025-06-23 | End: 2025-06-23 | Stop reason: SDUPTHER

## 2025-06-23 RX ORDER — SODIUM CHLORIDE 0.9 % (FLUSH) 0.9 %
5-40 SYRINGE (ML) INJECTION PRN
Status: DISCONTINUED | OUTPATIENT
Start: 2025-06-23 | End: 2025-06-23 | Stop reason: SDUPTHER

## 2025-06-23 RX ORDER — NALOXONE HYDROCHLORIDE 0.4 MG/ML
INJECTION, SOLUTION INTRAMUSCULAR; INTRAVENOUS; SUBCUTANEOUS PRN
Status: DISCONTINUED | OUTPATIENT
Start: 2025-06-23 | End: 2025-06-23 | Stop reason: HOSPADM

## 2025-06-23 RX ORDER — IPRATROPIUM BROMIDE AND ALBUTEROL SULFATE 2.5; .5 MG/3ML; MG/3ML
SOLUTION RESPIRATORY (INHALATION)
Status: COMPLETED
Start: 2025-06-23 | End: 2025-06-23

## 2025-06-23 RX ORDER — SODIUM CHLORIDE 9 MG/ML
INJECTION, SOLUTION INTRAVENOUS PRN
Status: DISCONTINUED | OUTPATIENT
Start: 2025-06-23 | End: 2025-06-23 | Stop reason: SDUPTHER

## 2025-06-23 RX ORDER — SODIUM CHLORIDE 9 MG/ML
INJECTION, SOLUTION INTRAVENOUS CONTINUOUS
Status: DISCONTINUED | OUTPATIENT
Start: 2025-06-23 | End: 2025-06-24 | Stop reason: HOSPADM

## 2025-06-23 RX ORDER — FENTANYL CITRATE 50 UG/ML
25 INJECTION, SOLUTION INTRAMUSCULAR; INTRAVENOUS EVERY 5 MIN PRN
Status: DISCONTINUED | OUTPATIENT
Start: 2025-06-23 | End: 2025-06-23 | Stop reason: HOSPADM

## 2025-06-23 RX ORDER — LIDOCAINE HYDROCHLORIDE 40 MG/ML
2.5 INJECTION, SOLUTION RETROBULBAR ONCE
Status: DISCONTINUED | OUTPATIENT
Start: 2025-06-23 | End: 2025-06-24 | Stop reason: HOSPADM

## 2025-06-23 RX ORDER — SODIUM CHLORIDE 0.9 % (FLUSH) 0.9 %
5-40 SYRINGE (ML) INJECTION EVERY 12 HOURS SCHEDULED
Status: DISCONTINUED | OUTPATIENT
Start: 2025-06-23 | End: 2025-06-23 | Stop reason: SDUPTHER

## 2025-06-23 RX ORDER — SODIUM CHLORIDE 0.9 % (FLUSH) 0.9 %
5-40 SYRINGE (ML) INJECTION PRN
Status: DISCONTINUED | OUTPATIENT
Start: 2025-06-23 | End: 2025-06-24 | Stop reason: HOSPADM

## 2025-06-23 RX ORDER — ONDANSETRON 2 MG/ML
INJECTION INTRAMUSCULAR; INTRAVENOUS
Status: DISCONTINUED | OUTPATIENT
Start: 2025-06-23 | End: 2025-06-23 | Stop reason: SDUPTHER

## 2025-06-23 RX ORDER — SODIUM CHLORIDE 9 MG/ML
INJECTION, SOLUTION INTRAVENOUS
Status: DISCONTINUED | OUTPATIENT
Start: 2025-06-23 | End: 2025-06-23 | Stop reason: SDUPTHER

## 2025-06-23 RX ADMIN — SODIUM CHLORIDE, PRESERVATIVE FREE 10 ML: 5 INJECTION INTRAVENOUS at 09:09

## 2025-06-23 RX ADMIN — LIDOCAINE HYDROCHLORIDE 80 MG: 20 INJECTION, SOLUTION EPIDURAL; INFILTRATION; INTRACAUDAL; PERINEURAL at 13:21

## 2025-06-23 RX ADMIN — PROPOFOL 170 MG: 10 INJECTION, EMULSION INTRAVENOUS at 13:21

## 2025-06-23 RX ADMIN — IPRATROPIUM BROMIDE AND ALBUTEROL SULFATE 1 DOSE: .5; 3 SOLUTION RESPIRATORY (INHALATION) at 14:15

## 2025-06-23 RX ADMIN — ONDANSETRON 4 MG: 2 INJECTION, SOLUTION INTRAMUSCULAR; INTRAVENOUS at 14:04

## 2025-06-23 RX ADMIN — ROCURONIUM BROMIDE 50 MG: 10 INJECTION, SOLUTION INTRAVENOUS at 13:21

## 2025-06-23 RX ADMIN — METRONIDAZOLE 500 MG: 500 INJECTION, SOLUTION INTRAVENOUS at 14:57

## 2025-06-23 RX ADMIN — IPRATROPIUM BROMIDE AND ALBUTEROL SULFATE 1 DOSE: 2.5; .5 SOLUTION RESPIRATORY (INHALATION) at 14:15

## 2025-06-23 RX ADMIN — DEXAMETHASONE SODIUM PHOSPHATE 4 MG: 4 INJECTION, SOLUTION INTRA-ARTICULAR; INTRALESIONAL; INTRAMUSCULAR; INTRAVENOUS; SOFT TISSUE at 09:09

## 2025-06-23 RX ADMIN — WATER 2000 MG: 1 INJECTION INTRAMUSCULAR; INTRAVENOUS; SUBCUTANEOUS at 09:09

## 2025-06-23 RX ADMIN — DEXAMETHASONE SODIUM PHOSPHATE 10 MG: 4 INJECTION, SOLUTION INTRAMUSCULAR; INTRAVENOUS at 13:24

## 2025-06-23 RX ADMIN — METRONIDAZOLE 500 MG: 500 INJECTION, SOLUTION INTRAVENOUS at 04:48

## 2025-06-23 RX ADMIN — MORPHINE SULFATE 4 MG: 4 INJECTION, SOLUTION INTRAMUSCULAR; INTRAVENOUS at 23:30

## 2025-06-23 RX ADMIN — SODIUM CHLORIDE: 9 INJECTION, SOLUTION INTRAVENOUS at 13:18

## 2025-06-23 RX ADMIN — SUGAMMADEX 200 MG: 100 INJECTION, SOLUTION INTRAVENOUS at 14:04

## 2025-06-23 RX ADMIN — DEXAMETHASONE SODIUM PHOSPHATE 4 MG: 4 INJECTION, SOLUTION INTRA-ARTICULAR; INTRALESIONAL; INTRAMUSCULAR; INTRAVENOUS; SOFT TISSUE at 04:44

## 2025-06-23 ASSESSMENT — PAIN SCALES - GENERAL
PAINLEVEL_OUTOF10: 7
PAINLEVEL_OUTOF10: 2
PAINLEVEL_OUTOF10: 3
PAINLEVEL_OUTOF10: 2
PAINLEVEL_OUTOF10: 3

## 2025-06-23 ASSESSMENT — PAIN DESCRIPTION - FREQUENCY
FREQUENCY: CONTINUOUS
FREQUENCY: INTERMITTENT

## 2025-06-23 ASSESSMENT — PAIN DESCRIPTION - ORIENTATION
ORIENTATION: INNER
ORIENTATION: RIGHT

## 2025-06-23 ASSESSMENT — PAIN - FUNCTIONAL ASSESSMENT
PAIN_FUNCTIONAL_ASSESSMENT: ACTIVITIES ARE NOT PREVENTED
PAIN_FUNCTIONAL_ASSESSMENT: NONE - DENIES PAIN

## 2025-06-23 ASSESSMENT — LIFESTYLE VARIABLES: SMOKING_STATUS: 1

## 2025-06-23 ASSESSMENT — PAIN DESCRIPTION - LOCATION
LOCATION: THROAT;CHEST
LOCATION: THROAT

## 2025-06-23 ASSESSMENT — PAIN DESCRIPTION - ONSET
ONSET: ON-GOING
ONSET: ON-GOING

## 2025-06-23 ASSESSMENT — PAIN DESCRIPTION - DESCRIPTORS
DESCRIPTORS: SORE
DESCRIPTORS: SORE

## 2025-06-23 ASSESSMENT — PAIN DESCRIPTION - PAIN TYPE
TYPE: ACUTE PAIN;OTHER (COMMENT)
TYPE: CHRONIC PAIN

## 2025-06-23 NOTE — PROGRESS NOTES
[facility]    Hospitalist PROGRESS NOTE dictated by Nitin Pack MD on 2025    Patient ID: Esdras Marie is 70 y.o. and presently in room A  : 1955 MRN: [id number]    Admit date: 2025  Primary Care Physician: No primary care provider on file.   No care team member to display  Tel: None  Admitting Physician: Nitin Pack MD   Code Status:  Full Code    Esdras Marie is a 70 y.o.  male who presented with Pharyngitis    Room:   Admit date: 2025    Chief Complaint:  sore throat     History Of Present Illness:  Esdras Marie is a 70 y.o. male with PMHx of tobacco abuse who presents to [facility] with sore throat.  Patient states that for the last 2 days he has been having a severe sore throat. He states that he has been not able to swallow well and the pain is a 5/10, but pain worsens to 10/10 when trying to swallow. Denies any sick contacts. Denies nausea, vomiting, diarrhea, chest pain and shortness of breath.   -----    2025: Patient states has been having sore throat since Wednesday and difficulty swallowing solids and liquids.  Patient has some cough secondary to postnasal drip.  Denies chest pain, shortness of breath, diarrhea, fevers or chills.  Patient smokes about half a pack of cigarettes a day and presently does not wish nicotine patch.  Daughter at bedside.    Strep screen positive. Patient initially received vancomycin and Rocephin.  Given concern for post obstructive pneumonia and malignancy starting cefepime pending ID evaluation.  However, of note the procalcitonin level is only 0.06.    Per ENT: Mucosal changes in the arytenoid and aryepiglottic folds suspicious for malignancy. Epiglottis is normal.     Calcium 1.0.  CO2 is 18.  WBC 11.6 and hemoglobin 12.2.  ESR 56 and CRP 12.7  ----------    2025: Patient with sore throat, positive strep screen, arytenoid and  consult.  Pulmonary consult.  ID consult.  Diet NPO  Continuous Infusions:    sodium chloride      sodium chloride 25 mL (06/21/25 0422)     cefTRIAXone (ROCEPHIN) IV, 2,000 mg, Q24H  metroNIDAZOLE, 500 mg, Q12H  sodium chloride flush, 5-40 mL, 2 times per day  oxymetazoline, 2 spray, Once      Code Status:  Full Code  Presently pharmacologic DVT prophylaxis is on hold.  IP CONSULT TO OTOLARYNGOLOGY  IP CONSULT TO PULMONOLOGY  IP CONSULT TO INFECTIOUS DISEASES  IP CONSULT TO PULMONOLOGY      /O (24Hr):    Intake/Output Summary (Last 24 hours) at 6/23/2025 1132  Last data filed at 6/23/2025 0904  Gross per 24 hour   Intake 650 ml   Output --   Net 650 ml       Patient Vitals for the past 96 hrs (Last 3 readings):   Weight   06/20/25 1728 59 kg (130 lb)     Admission weight: 59 kg (130 lb)  Wt Readings from Last 3 Encounters:   06/20/25 59 kg (130 lb)   05/02/25 65.8 kg (145 lb)   09/12/21 66.9 kg (147 lb 6.4 oz)    (encounters)    Vitals:    06/22/25 2254 06/23/25 0430 06/23/25 0739 06/23/25 1106   BP:  128/75 115/86 112/71   Pulse: 90 70 71 67   Resp: 16 16 18 18   Temp:   97.6 °F (36.4 °C) 98.7 °F (37.1 °C)   TempSrc:   Oral Oral   SpO2: 91% 91% 94% 92%   Weight:       Height:           CBC:   Recent Labs     06/21/25  0757 06/22/25  0652 06/23/25  0736   WBC 11.6* 10.3 9.1   RBC 3.93* 3.72* 4.22*   HGB 12.2* 11.4* 13.0*   HCT 38.3* 35.2* 39.6*   MCV 97.5* 94.6* 93.8   MCH 31.0 30.6 30.8   MCHC 31.9* 32.4 32.8    320 372   MPV 9.7 10.0 10.0         MAG:   Recent Labs     06/21/25  0757 06/22/25  0652 06/23/25  0736   MG 2.2 2.0 2.2       CMP:   Recent Labs     06/21/25  0757 06/22/25  0652 06/23/25  0736    143 137   K 4.1 3.9 4.3    108 100   CO2 18* 20* 22   BUN 16 19 18   CREATININE 0.6* 0.6* 0.6*   GLUCOSE 78 86 114*   CALCIUM 9.1 8.7* 9.4      No results for input(s): \"LIPASE\", \"AMYLASE\" in the last 72 hours.    Phosphorus:    Recent Labs     06/21/25 0757   PHOS 2.8       Folate and B12:

## 2025-06-23 NOTE — PLAN OF CARE
Problem: Discharge Planning  Goal: Discharge to home or other facility with appropriate resources  Outcome: Progressing  Flowsheets (Taken 6/23/2025 1611)  Discharge to home or other facility with appropriate resources:   Identify barriers to discharge with patient and caregiver   Identify discharge learning needs (meds, wound care, etc)   Arrange for needed discharge resources and transportation as appropriate     Problem: Pain  Goal: Verbalizes/displays adequate comfort level or baseline comfort level  Outcome: Progressing  Flowsheets (Taken 6/23/2025 1611)  Verbalizes/displays adequate comfort level or baseline comfort level:   Encourage patient to monitor pain and request assistance   Administer analgesics based on type and severity of pain and evaluate response   Implement non-pharmacological measures as appropriate and evaluate response   Assess pain using appropriate pain scale     Problem: ABCDS Injury Assessment  Goal: Absence of physical injury  Outcome: Progressing  Flowsheets (Taken 6/23/2025 1611)  Absence of Physical Injury: Implement safety measures based on patient assessment     Problem: Coping  Goal: Pt/Family able to verbalize concerns and demonstrate effective coping strategies  Description: INTERVENTIONS:  1. Assist patient/family to identify coping skills, available support systems and cultural and spiritual values  2. Provide emotional support, including active listening and acknowledgement of concerns of patient and caregivers  3. Reduce environmental stimuli, as able  4. Instruct patient/family in relaxation techniques, as appropriate  5. Assess for spiritual pain/suffering and initiate Spiritual Care, Psychosocial Clinical Specialist consults as needed  Outcome: Progressing  Flowsheets (Taken 6/23/2025 1611)  Patient/family able to verbalize anxieties, fears, and concerns, and demonstrate effective coping:   Assist patient/family to identify coping skills, available support systems and

## 2025-06-23 NOTE — PROGRESS NOTES
Esdras admitted to [facility] for bronchoscopy with ebus with Dr. Mayer. Consent form signed and verified with pt. Allergies verified with pt. Pt family wife aura and children Belia and Shahab at bedside with pt.

## 2025-06-23 NOTE — PROGRESS NOTES
Patient in endo for EBUS. Scope  and  used. 22 gauge visio shot needle used for 9 passes. FNA in positions Station 7, 4R,10R. BAL completed. 1 biopsies taken.  jars labeled and sent to lab. APC used. Grounding pad removed. Skin intact.Pictures taken. Procedure completed. Patient tolerated well.

## 2025-06-23 NOTE — PROGRESS NOTES
Department of Otolaryngology  Progress Note    Chief Complaint:  Pharyngitis      ASSESSMENT AND PLAN    Case discussed with Dr. Flores. Plan for MBS/FEES prior to consideration of biopsy for mucosal changes in the arytenoid and aryepiglottic folds suspicious for malignancy seen on ENT scope. MBS/FEES held off today given plan for EBUS. ENT will continue to follow while admitted.        Electronically signed by Shabana Chavira PA-C on 6/22/2025 at 9:23 PM

## 2025-06-23 NOTE — CONSULTS
Department of Internal Medicine  Interventional Pulmonary  Attending Consult Note      Reason for Consult:  Eval for biopsy  Requesting Physician:  Dr. Mazariegos    CHIEF COMPLAINT:  Sore throat, cough    History Obtained From:  patient    HISTORY OF PRESENT ILLNESS:                The patient is a 70 y.o. male with sore throat and cough, had a slight episode of hemoptysis prior to admission as well and was admitted. The patient was noted to have posterior pharyngeal edema and abnormal CT scan with RUL obstructive atelectasis. The patient has been treated with antibiotics and has noted favorable response to treatment, some sore throat but less this morning. The patient was started on decadron last night into today to help ensure no swelling in pharynx prior to bronchoscopy. The patient denies chest pain, n,v,d,or abdominal pain. No changes in vision, lightheadedness or dizziness. The rest of the ROS systems are negative. IP is consulted for biopsy of the RUL mass and lymphadenopathy.    Past Medical History:        Diagnosis Date    Ruptured disk      Past Surgical History:        Procedure Laterality Date    BACK SURGERY      IR PERC ASPIRATION INTERVERT DISC  9/15/2021    IR PERC ASPIRATION INTERVERT DISC 9/15/2021 [facility] SPECIAL PROCEDURES    TONSILLECTOMY       Current Medications:    Current Facility-Administered Medications: cefTRIAXone (ROCEPHIN) 2,000 mg in sterile water 20 mL IV syringe, 2,000 mg, IntraVENous, Q24H  metroNIDAZOLE (FLAGYL) 500 mg in 0.9% NaCl 100 mL IVPB premix, 500 mg, IntraVENous, Q12H  sodium chloride flush 0.9 % injection 5-40 mL, 5-40 mL, IntraVENous, 2 times per day  sodium chloride flush 0.9 % injection 5-40 mL, 5-40 mL, IntraVENous, PRN  0.9 % sodium chloride infusion, , IntraVENous, PRN  magnesium sulfate 2000 mg in 50 mL IVPB premix, 2,000 mg, IntraVENous, PRN  ondansetron (ZOFRAN-ODT) disintegrating tablet 4 mg, 4 mg, Oral, Q8H PRN **OR** ondansetron (ZOFRAN) injection 4 mg, 4 mg,  able to follow simple commands  Skin: Warm and dry.   DATA:    CBC with Differential:    Lab Results   Component Value Date/Time    WBC 9.1 06/23/2025 07:36 AM    RBC 4.22 06/23/2025 07:36 AM    RBC 4.49 03/09/2012 02:33 PM    HGB 13.0 06/23/2025 07:36 AM    HGB 15.1 03/09/2012 02:33 PM    HCT 39.6 06/23/2025 07:36 AM     06/23/2025 07:36 AM    MCV 93.8 06/23/2025 07:36 AM    MCH 30.8 06/23/2025 07:36 AM    MCHC 32.8 06/23/2025 07:36 AM    RDW 13.6 03/09/2012 02:33 PM    NRBC 0 06/23/2025 07:36 AM    NRBC 0 03/09/2012 02:33 PM    MONOPCT 1.9 06/23/2025 07:36 AM    MONOSABS 0.2 06/23/2025 07:36 AM    LYMPHSABS 0.6 06/23/2025 07:36 AM    EOSABS 0.0 06/23/2025 07:36 AM    BASOSABS 0.0 06/23/2025 07:36 AM     BMP:    Lab Results   Component Value Date/Time     06/23/2025 07:36 AM    K 4.3 06/23/2025 07:36 AM    K 4.3 09/12/2021 09:07 AM     06/23/2025 07:36 AM    CO2 22 06/23/2025 07:36 AM    BUN 18 06/23/2025 07:36 AM    CREATININE 0.6 06/23/2025 07:36 AM    CALCIUM 9.4 06/23/2025 07:36 AM    LABGLOM > 90 06/23/2025 07:36 AM    LABGLOM >90 09/12/2021 09:07 AM    GLUCOSE 114 06/23/2025 07:36 AM     Radiology Review:  CT chest shows post-obstructive atelectasis of the right upper lobe and lymphadenopathy of 10R, likely 4R, 7 is calcified and there's a retrosternal note as well    IMPRESSION/RECOMMENDATIONS:      Right upper lobe post-obstructive atelectasis and likely endobronchial lesion with lymphadenopathy    Plan:  Risks/benefits and alternatives discussed with patient and his wife to proceed with bronchoscopy with biopsy, possible APC, possible balloon dilation, endobronchial ultrasound with biopsy.  Risks including severe bleeding/infection/argon air embolism < 1%, pneumothorax 1% and due to recent pharyngeal swelling risk of respiratory failure requiring reintubation following extubation is around 1-2%.  The patient and wife noted understanding of these risks and agrees to proceed with this plan

## 2025-06-23 NOTE — PROGRESS NOTES
[facility]  SPEECH THERAPY MISSED TREATMENT NOTE  [facility]      Date: 2025  Patient Name: Esdras Marie        MRN: [id number]    : 1955  (70 y.o.)    REASON FOR MISSED TREATMENT:  POC at date consistent for instrumental evaluation via MBS. MEREDITH Mera reporting patient with needs to maintain NPO s/t planned bronchoscopy and EBUS. Will complete formal instrumentation on subsequent date as status permits.      Joceline German M.A., CCC-SLP [id number]

## 2025-06-23 NOTE — PROGRESS NOTES
[facility] Infectious Disease         Progress Note      Esdras Marie  MEDICAL RECORD NUMBER:  [id number]  AGE: 70 y.o.   GENDER: male  : 1955  EPISODE DATE:  2025      Assessment:     Patient is a 70-year-old male who I am following for supraglottic swelling and right upper lobe mass.    CT imaging of the chest reviewed with evidence of right upper lobe collapse and a right hilar/mediastinal mass.  There is also evidence of local lymphadenopathy. I have discontinued cefepime.  The typical agents involved in either pneumonia or glottic inflammation would include Staphylococcus, strep (group A or pneumoniae), haemophilus.  Less likely to be anaerobic involvement though I have added metronidazole.  If there is evidence of clinical deterioration, would consider addition of anti-MRSA agent with vancomycin.    Pulmonology following and plan for bronchoscopy and biopsy to be performed   Currently on ceftriaxone and metronidazole  Will follow bronchoscopy results though low suspicion this is infectious in origin for mass like lesion.  The area of swelling in the supraglottic area may be related to strep      Subjective:     Chief Complaint   Patient presents with    Pharyngitis         No acute events overnight, no new complaints or concerns this morning.       Patient Active Problem List   Diagnosis Code    Intractable low back pain M54.59    Back pain M54.9    Lung mass R91.8    Streptococcal sore throat J02.0    Tobacco abuse Z72.0    Supraglottitis without airway obstruction J04.30    Hemoptysis R04.2    History of incarceration Z78.9    Sore throat J02.9             PAST MEDICAL HISTORY        Diagnosis Date    Ruptured disk        PAST SURGICAL HISTORY    Past Surgical History:   Procedure Laterality Date    BACK SURGERY      IR PERC ASPIRATION INTERVERT DISC  9/15/2021    IR PERC ASPIRATION INTERVERT DISC 9/15/2021 [facility] PROCEDURES    TONSILLECTOMY         FAMILY  HISTORY    Family History   Family history unknown: Yes       SOCIAL HISTORY    Social History     Tobacco Use    Smoking status: Every Day     Current packs/day: 1.00     Average packs/day: 1 pack/day for 40.0 years (40.0 ttl pk-yrs)     Types: Cigarettes    Smokeless tobacco: Current   Vaping Use    Vaping status: Never Used   Substance Use Topics    Alcohol use: Yes     Comment: states once in a while    Drug use: Not Currently     Types: IV, Opiates , Marijuana (Weed)       ALLERGIES    Allergies   Allergen Reactions    Compazine [Prochlorperazine]        MEDICATIONS    No current facility-administered medications on file prior to encounter.     Current Outpatient Medications on File Prior to Encounter   Medication Sig Dispense Refill    ibuprofen (ADVIL;MOTRIN) 200 MG tablet Take 1 tablet by mouth every 6 hours as needed for Pain      etodolac (LODINE) 300 MG capsule Take 1 capsule by mouth in the morning and 1 capsule at noon and 1 capsule in the evening. (Patient not taking: Reported on 6/21/2025) 30 capsule 0       REVIEW OF SYSTEMS    10 point review of systems performed which is otherwise negative      Objective:      /75   Pulse 70   Temp 98 °F (36.7 °C) (Oral)   Resp 16   Ht 1.727 m (5' 8\")   Wt 59 kg (130 lb)   SpO2 91%   BMI 19.77 kg/m²     Wt Readings from Last 3 Encounters:   06/20/25 59 kg (130 lb)   05/02/25 65.8 kg (145 lb)   09/12/21 66.9 kg (147 lb 6.4 oz)         There is no immunization history on file for this patient.    PHYSICAL EXAM    /75   Pulse 70   Temp 98 °F (36.7 °C) (Oral)   Resp 16   Ht 1.727 m (5' 8\")   Wt 59 kg (130 lb)   SpO2 91%   BMI 19.77 kg/m²   General:  Awake, alert, not in distress.  HEENT: pink conjunctiva, unicteric sclera, moist oral mucosa.  Chest: Otherwise clear to auscultation but reduced breath sounds in right upper  Cardiovascular:  RRR ,S1S2, no murmur or gallop.  Abdomen:  Soft, non tender to palpation.  Extremities: Normal-appearing

## 2025-06-23 NOTE — PROCEDURES
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 Tumor had started to bleed slightly prior to biosy.    Specimen: RUL TBB, BAL RUL, station 7, 4R, 10R    Estimated Blood Loss: Minimal    Drains: n/a    Complications: none apparent    Indications and History:  The patient is a 70 y.o. male with endobronchial lesion, lymphadenopathy.  The risks, benefits, complications, treatment options and expected outcomes were discussed with the patient.  The possibilities of reaction to medication, pulmonary aspiration, perforation of a viscus, bleeding, failure to diagnose a condition and creating a complication requiring transfusion or operation were discussed with the patient who freely signed the consent.      Description of Procedure:  The patient was seen in the Holding Room and the site of surgery properly noted/marked.  The patient was taken to ENDO 14, identified as Esdras Marie and the procedure verified as Flexible Fiberoptic Bronchoscopy.  A Time Out was held and the above information confirmed.     After adequate sedation the bronchoscope was placed thru the ET tube into the distal trachea. Inspection of the distal trachea, left mainstem, upper and lower lobes, right mainstem and upper, middle and lower lobes.  There was a necrotic tumor occluding the right upper lobe bronchus with partial

## 2025-06-23 NOTE — PROGRESS NOTES
[place] [facility]      Patient - Esdras Marie   MRN -  [id number]   Located within [facility] # - [id number]   - 1955      Date of Admission -  2025  5:25 PM  Date of evaluation -  2025  Room - --A   Hospital Day - 1  Consulting - Nitin Pack MD Primary Care Physician - No primary care provider on file.     Problem List      Active Hospital Problems    Diagnosis Date Noted    Sore throat [J02.9] 2025    Lung mass [R91.8] 2025    Streptococcal sore throat [J02.0] 2025    Tobacco abuse [Z72.0] 2025    Hemoptysis [R04.2] 2025    History of incarceration [Z78.9] 2025     Reason for Consult    Lung mass  HPI   History Obtained From: Patient and electronic medical record.    Esdras Marie is a 70 y.o. male  was initially admitted under hospitalist service. He has past medical history of ruptured disk and tobacco use. He presented to [facility] with sore throat and difficulty with swallowing. ENT consulted for supraglottitis. He was incidentally found to have a partially visualized right upper lobe mass measuring at least 7.5 x 4.7 cm, obstructing the right upper lobe bronchus with postobstructive atelectasis on CT Neck and thus chest x-ray was obtained and revealed right upper lung opacification with postobstructive atelectasis.    Pulmonary medicine was consulted for further management of lung mass.    He was ever seen by a pulmonologist in the past:No  Prior history of lung nodule/Lung mass:No  He was ever diagnosed with lung cancer:No    History of tobacco smoking:Yes  Amount of tobacco smokin.0-2.0 PPD.  Years of tobacco smokin-60 year.                                    Quit smoking: No        Current smoker:Yes  Amount of current tobacco smokin.75 PPD    He admits to cough - white foamy. About 2 weeks ago had hemoptysis. He reports pink tinged mucus or strands in mucus. He had a sore throat at the time. Occurred  round.  Neck: Neck supple. No tracheal deviation present.   Cardiovascular: S1 and S2 with no murmur.  No peripheral edema  Pulmonary/Chest: Normal effort with bilateral air entry slightly diminished breath sounds throughout. No stridor. No respiratory distress. Patient exhibits no tenderness.   Abdominal: Soft. Bowel sounds audible. No distension or tenderness to palp.   Musculoskeletal: Moves all extremities  Neurological: Patient is alert and able to follow simple commands  Skin: Warm and dry.    Labs  - Old records and notes have been reviewed in [facility]   ABG  No results found for: \"PH\", \"PO2\", \"PCO2\", \"HCO3\", \"O2SAT\"  No results found for: \"IFIO2\", \"MODE\", \"SETTIDVOL\", \"SETPEEP\"  CBC  Recent Labs     06/21/25  0757 06/22/25  0652 06/23/25  0736   WBC 11.6* 10.3 9.1   RBC 3.93* 3.72* 4.22*   HGB 12.2* 11.4* 13.0*   HCT 38.3* 35.2* 39.6*   MCV 97.5* 94.6* 93.8   MCH 31.0 30.6 30.8   MCHC 31.9* 32.4 32.8    320 372   MPV 9.7 10.0 10.0      BMP  Recent Labs     06/21/25  0757 06/22/25  0652 06/23/25  0736    143 137   K 4.1 3.9 4.3    108 100   CO2 18* 20* 22   BUN 16 19 18   CREATININE 0.6* 0.6* 0.6*   GLUCOSE 78 86 114*   MG 2.2 2.0 2.2   PHOS 2.8  --   --    CALCIUM 9.1 8.7* 9.4     LFT  No results for input(s): \"AST\", \"ALT\", \"BILITOT\", \"ALKPHOS\", \"AMYLASE\", \"LIPASE\" in the last 72 hours.    Invalid input(s): \"ALB\"  TROP  No results found for: \"TROPONINT\"  BNP  No results for input(s): \"BNP\" in the last 72 hours.  Lactic Acid  Recent Labs     06/20/25 2121   LACTA 1.2     INR  No results for input(s): \"INR\", \"PROTIME\" in the last 72 hours.  PTT  No results for input(s): \"APTT\" in the last 72 hours.  Glucose  No results for input(s): \"POCGLU\" in the last 72 hours.  UA No results for input(s): \"SPECGRAV\", \"PHUR\", \"COLORU\", \"CLARITYU\", \"MUCUS\", \"PROTEINU\", \"BLOODU\", \"RBCUA\", \"WBCUA\", \"BACTERIA\", \"NITRU\", \"GLUCOSEU\", \"BILIRUBINUR\", \"UROBILINOGEN\", \"KETUA\", \"LABCAST\", \"LABCASTTY\", \"AMORPHOS\" in

## 2025-06-23 NOTE — CARE COORDINATION
Case Management Assessment Initial Evaluation    Date/Time of Evaluation: 2025 8:31 AM  Assessment Completed by: Nancy Pepe RN    If patient is discharged prior to next notation, then this note serves as note for discharge by case management.    Patient Name: Esdras Marie                   YOB: 1955  Diagnosis: Streptococcal sore throat [J02.0]  Lung mass [R91.8]  Adult supraglottitis [J04.30]  Supraglottitis without airway obstruction [J04.30]  Sore throat [J02.9]                   Date / Time: 2025  5:25 PM  Location: [facility]     Patient Admission Status: Inpatient   Readmission Risk Low 0-14, Mod 15-19), High > 20: Readmission Risk Score: 10.7    Current PCP: No primary care provider on file.  Health Care Decision Makers:   Primary Decision Maker: Colette Bustillo [facility] - [phone]    Additional Case Management Notes: ED lobby for evaluation of pharyngitis x 3 days.  CT obtained of neck which demonstrated supraglottic swelling for which ENT was consulted. Imaging also demonstrated a right upper lobe mass with possible postobstructive pattern in right upper lobe. Pulmonology evaluating for possible biopsy.   Procedures:   Bronchoscopy with planned biopsy   mod Ba swallow  Imagin/21 CT chest:  Right upper lobe collapse.There is an ill-defined right paramediastinal and hilar mass.   Enlarged mediastinal lymph nodes.    CXR:  Right upper lung opacification corresponding to right upper lobe mass with postobstructive atelectasis on recent CT Neck. Superimposed pneumonia   cannot be excluded.   CT soft tissue neck  Supraglottitis with sparing of the epiglottis. Right upper lobe mass obstructing the right upper lobe bronchus with postobstructive atelectasis, suspicious for malignancy.      Patient Goals/Plan/Treatment Preferences: Met with Esdras; resides home with wife and M-I-L. Has no PCP and is interested in our residency clinic.  Declined DME

## 2025-06-23 NOTE — ANESTHESIA PRE PROCEDURE
Department of Anesthesiology  Preprocedure Note       Name:  Esdras Marie   Age:  70 y.o.  :  1955                                          MRN:  [id number]         Date:  2025      Surgeon: Surgeon(s):  Alfonso Mayer DO    Procedure: Procedure(s):  BRONCHOSCOPY W. BIOPSY POSS APC, EBUS W/ BIOPSY    Medications prior to admission:   Prior to Admission medications    Medication Sig Start Date End Date Taking? Authorizing Provider   ibuprofen (ADVIL;MOTRIN) 200 MG tablet Take 1 tablet by mouth every 6 hours as needed for Pain   Yes Provider, MD Darshana   etodolac (LODINE) 300 MG capsule Take 1 capsule by mouth in the morning and 1 capsule at noon and 1 capsule in the evening.  Patient not taking: Reported on 2025   Sri Dutta APRN - CNP       Current medications:    Current Facility-Administered Medications   Medication Dose Route Frequency Provider Last Rate Last Admin   • 0.9 % sodium chloride infusion   IntraVENous Continuous Nitin Pack MD       • cefTRIAXone (ROCEPHIN) 2,000 mg in sterile water 20 mL IV syringe  2,000 mg IntraVENous Q24H Nader Randall MD   2,000 mg at 25 0909   • metroNIDAZOLE (FLAGYL) 500 mg in 0.9% NaCl 100 mL IVPB premix  500 mg IntraVENous Q12H Nader Randall  mL/hr at 25 0448 500 mg at 25 0448   • sodium chloride flush 0.9 % injection 5-40 mL  5-40 mL IntraVENous 2 times per day Mickey Aragon APRN - CNP   10 mL at 25 0909   • sodium chloride flush 0.9 % injection 5-40 mL  5-40 mL IntraVENous PRN Mickey Aragon APRN - CNP       • 0.9 % sodium chloride infusion   IntraVENous PRN Mickey Aragon APRN -  mL/hr at 25 0422 25 mL at 25 0422   • magnesium sulfate 2000 mg in 50 mL IVPB premix  2,000 mg IntraVENous PRN Mickey Aragon APRN - CNP       • ondansetron (ZOFRAN-ODT) disintegrating tablet 4 mg  4 mg Oral Q8H PRN Mickey Aragon, APRN - CNP        Or   • ondansetron (ZOFRAN)

## 2025-06-23 NOTE — PROGRESS NOTES
1410 pt arrived to PACU, awakens to voice. Denies pain. VSS  1415 Duoneb given  1426 report given to Ede HARPER  1440 meets criteria for discharge from PACU. Transported to [facility] in stable condition. No family in waiting room

## 2025-06-24 ENCOUNTER — TELEPHONE (OUTPATIENT)
Dept: PULMONOLOGY | Age: 70
End: 2025-06-24

## 2025-06-24 ENCOUNTER — TELEPHONE (OUTPATIENT)
Dept: ENT CLINIC | Age: 70
End: 2025-06-24

## 2025-06-24 ENCOUNTER — APPOINTMENT (OUTPATIENT)
Dept: GENERAL RADIOLOGY | Age: 70
DRG: 152 | End: 2025-06-24
Payer: MEDICARE

## 2025-06-24 VITALS
RESPIRATION RATE: 16 BRPM | OXYGEN SATURATION: 97 % | SYSTOLIC BLOOD PRESSURE: 129 MMHG | HEIGHT: 68 IN | HEART RATE: 66 BPM | TEMPERATURE: 97.5 F | WEIGHT: 130 LBS | BODY MASS INDEX: 19.7 KG/M2 | DIASTOLIC BLOOD PRESSURE: 75 MMHG

## 2025-06-24 LAB
AFB CULTURE & SMEAR: NORMAL
ANION GAP SERPL CALC-SCNC: 11 MEQ/L (ref 8–16)
BACTERIA SPEC RESP CULT: NORMAL
BASOPHILS ABSOLUTE: 0 THOU/MM3 (ref 0–0.1)
BASOPHILS NFR BLD AUTO: 0.2 %
BUN SERPL-MCNC: 20 MG/DL (ref 8–23)
CALCIUM SERPL-MCNC: 8.8 MG/DL (ref 8.8–10.2)
CHLORIDE SERPL-SCNC: 101 MEQ/L (ref 98–111)
CO2 SERPL-SCNC: 22 MEQ/L (ref 22–29)
CREAT SERPL-MCNC: 0.6 MG/DL (ref 0.7–1.2)
DEPRECATED RDW RBC AUTO: 49.4 FL (ref 35–45)
EOSINOPHIL NFR BLD AUTO: 0 %
EOSINOPHILS ABSOLUTE: 0 THOU/MM3 (ref 0–0.4)
ERYTHROCYTE [DISTWIDTH] IN BLOOD BY AUTOMATED COUNT: 12.9 % (ref 11.5–14.5)
FUNGUS SPEC FUNGUS STN: NORMAL
GFR SERPL CREATININE-BSD FRML MDRD: > 90 ML/MIN/1.73M2
GLUCOSE SERPL-MCNC: 132 MG/DL (ref 74–109)
GRAM STN SPEC: NORMAL
HCT VFR BLD AUTO: 41 % (ref 42–52)
HGB BLD-MCNC: 12.3 GM/DL (ref 14–18)
IMM GRANULOCYTES # BLD AUTO: 0.1 THOU/MM3 (ref 0–0.07)
IMM GRANULOCYTES NFR BLD AUTO: 0.5 %
LYMPHOCYTES ABSOLUTE: 1.1 THOU/MM3 (ref 1–4.8)
LYMPHOCYTES NFR BLD AUTO: 5.8 %
MAGNESIUM SERPL-MCNC: 2.1 MG/DL (ref 1.6–2.6)
MCH RBC QN AUTO: 31 PG (ref 26–33)
MCHC RBC AUTO-ENTMCNC: 30 GM/DL (ref 32.2–35.5)
MCV RBC AUTO: 103.3 FL (ref 80–94)
MONOCYTES ABSOLUTE: 1.2 THOU/MM3 (ref 0.4–1.3)
MONOCYTES NFR BLD AUTO: 6 %
NEUTROPHILS ABSOLUTE: 16.8 THOU/MM3 (ref 1.8–7.7)
NEUTROPHILS NFR BLD AUTO: 87.5 %
NRBC BLD AUTO-RTO: 0 /100 WBC
PHOSPHATE SERPL-MCNC: 1.8 MG/DL (ref 2.5–4.5)
PLATELET # BLD AUTO: 367 THOU/MM3 (ref 130–400)
PMV BLD AUTO: 10.1 FL (ref 9.4–12.4)
POTASSIUM SERPL-SCNC: 4.2 MEQ/L (ref 3.5–5.2)
RBC # BLD AUTO: 3.97 MILL/MM3 (ref 4.7–6.1)
SODIUM SERPL-SCNC: 134 MEQ/L (ref 135–145)
WBC # BLD AUTO: 19.2 THOU/MM3 (ref 4.8–10.8)

## 2025-06-24 PROCEDURE — 2500000003 HC RX 250 WO HCPCS: Performed by: STUDENT IN AN ORGANIZED HEALTH CARE EDUCATION/TRAINING PROGRAM

## 2025-06-24 PROCEDURE — 6360000002 HC RX W HCPCS: Performed by: STUDENT IN AN ORGANIZED HEALTH CARE EDUCATION/TRAINING PROGRAM

## 2025-06-24 PROCEDURE — 6360000002 HC RX W HCPCS

## 2025-06-24 PROCEDURE — 99231 SBSQ HOSP IP/OBS SF/LOW 25: CPT

## 2025-06-24 PROCEDURE — 92611 MOTION FLUOROSCOPY/SWALLOW: CPT

## 2025-06-24 PROCEDURE — 99239 HOSP IP/OBS DSCHRG MGMT >30: CPT | Performed by: STUDENT IN AN ORGANIZED HEALTH CARE EDUCATION/TRAINING PROGRAM

## 2025-06-24 PROCEDURE — 99232 SBSQ HOSP IP/OBS MODERATE 35: CPT | Performed by: INTERNAL MEDICINE

## 2025-06-24 PROCEDURE — 36415 COLL VENOUS BLD VENIPUNCTURE: CPT

## 2025-06-24 PROCEDURE — 99232 SBSQ HOSP IP/OBS MODERATE 35: CPT | Performed by: STUDENT IN AN ORGANIZED HEALTH CARE EDUCATION/TRAINING PROGRAM

## 2025-06-24 PROCEDURE — 2500000003 HC RX 250 WO HCPCS: Performed by: INTERNAL MEDICINE

## 2025-06-24 PROCEDURE — 85025 COMPLETE CBC W/AUTO DIFF WBC: CPT

## 2025-06-24 PROCEDURE — 74230 X-RAY XM SWLNG FUNCJ C+: CPT

## 2025-06-24 PROCEDURE — 80048 BASIC METABOLIC PNL TOTAL CA: CPT

## 2025-06-24 PROCEDURE — 84100 ASSAY OF PHOSPHORUS: CPT

## 2025-06-24 PROCEDURE — 83735 ASSAY OF MAGNESIUM: CPT

## 2025-06-24 RX ORDER — OXYCODONE AND ACETAMINOPHEN 5; 325 MG/1; MG/1
1 TABLET ORAL EVERY 4 HOURS PRN
Qty: 30 TABLET | Refills: 0 | Status: SHIPPED | OUTPATIENT
Start: 2025-06-24 | End: 2025-06-29

## 2025-06-24 RX ADMIN — BARIUM SULFATE 30 ML: 0.81 POWDER, FOR SUSPENSION ORAL at 10:05

## 2025-06-24 RX ADMIN — SODIUM CHLORIDE, PRESERVATIVE FREE 10 ML: 5 INJECTION INTRAVENOUS at 10:19

## 2025-06-24 RX ADMIN — WATER 2000 MG: 1 INJECTION INTRAMUSCULAR; INTRAVENOUS; SUBCUTANEOUS at 10:18

## 2025-06-24 RX ADMIN — BARIUM SULFATE 10 ML: 400 PASTE ORAL at 10:05

## 2025-06-24 RX ADMIN — MORPHINE SULFATE 2 MG: 2 INJECTION, SOLUTION INTRAMUSCULAR; INTRAVENOUS at 02:55

## 2025-06-24 RX ADMIN — METRONIDAZOLE 500 MG: 500 INJECTION, SOLUTION INTRAVENOUS at 03:02

## 2025-06-24 ASSESSMENT — PAIN SCALES - GENERAL: PAINLEVEL_OUTOF10: 5

## 2025-06-24 NOTE — PROGRESS NOTES
[place] [facility] Pulmonary[facility] Sleep [facility] Critical Care [facility]      Patient - Esdras Marie   MRN -  [id number]   [facility] # - [id number]   - 1955      Date of Admission -  2025  5:25 PM  Date of evaluation -  2025  Room - 7--A   Hospital Day - 2  Consulting - Nishi Thomas MD Primary Care Physician - No primary care provider on file.     Problem List      Active Hospital Problems    Diagnosis Date Noted    Mediastinal lymphadenopathy [R59.0] 2025    Sore throat [J02.9] 2025    Lung mass [R91.8] 2025    Streptococcal sore throat [J02.0] 2025    Tobacco use [Z72.0] 2025    Hemoptysis [R04.2] 2025    History of incarceration [Z78.9] 2025     Reason for Consult    Lung mass  HPI   History Obtained From: Patient and electronic medical record.    Esdras Marie is a 70 y.o. male  was initially admitted under hospitalist service. He has past medical history of ruptured disk and tobacco use. He presented to [facility] with sore throat and difficulty with swallowing. ENT consulted for supraglottitis. He was incidentally found to have a partially visualized right upper lobe mass measuring at least 7.5 x 4.7 cm, obstructing the right upper lobe bronchus with postobstructive atelectasis on CT Neck and thus chest x-ray was obtained and revealed right upper lung opacification with postobstructive atelectasis.    Pulmonary medicine was consulted for further management of lung mass.    He was ever seen by a pulmonologist in the past:No  Prior history of lung nodule/Lung mass:No  He was ever diagnosed with lung cancer:No    History of tobacco smoking:Yes  Amount of tobacco smokin.0-2.0 PPD.  Years of tobacco smokin-60 year.                                    Quit smoking: No        Current smoker:Yes  Amount of current tobacco smokin.75 PPD    He admits to cough - white foamy. About 2 weeks ago had hemoptysis. He reports pink tinged mucus or strands in mucus.

## 2025-06-24 NOTE — PROGRESS NOTES
[facility]  SPEECH THERAPY  [facility] 7K  Modified Barium Swallow    Discharge Recommendations: No additional ST services recommended  DIET ORDER RECOMMENDATIONS AFTER EVALUATION: Regular, thin liquids  Strategies:  Full Upright Position, Small Bite/Sip, and Alternate Solids and Liquids     SLP Individual Minutes  Time In: 0950  Time Out: 1004  Minutes: 14  Timed Code Treatment Minutes: 0 Minutes       Date: 2025  Patient Name: Esdras Marie      CSN: [id number]   : 1955  (70 y.o.)  Gender: male   Referring Physician: Nitin Pack MD    Diagnosis: Streptococcal sore throat [J02.0]  Lung mass [R91.8]  Adult supraglottitis [J04.30]  Supraglottitis without airway obstruction [J04.30]  Sore throat [J02.9]  Precautions: Fall risk, aspiration precautions  History of Present Illness/Injury: Patient admitted to [facility] with above med dx; please refer to physician H&P for full details. Per chart review, \"70 y.o. male with PMHx of tobacco abuse who presents to [facility] with sore throat. Patient states that for the last 2 days he has been having a severe sore throat. He states that he has been not able to swallow well and the pain is a 5/10, but pain worsens to 10/10 when trying to swallow. Denies any sick contacts. Denies nausea, vomiting, diarrhea, chest pain and shortness of breath.\"     ENT consulted in the ED with laryngoscope completed: \"mucosal changes in the arytenoid and aryepiglottic folds suspicious for malignancy. Epiglottis is normal. VF are mobile in adduction and abduction, secretions pooling with penetration and aspiration. No concerns about airway compromise at this time\"     CT chest  IMPRESSION:  1. Right upper lobe collapse.There is an ill-defined right paramediastinal and  hilar mass.  2. Enlarged mediastinal lymph nodes    Current Diet: Puree, thin liquids    Pain: No pain reported.    SUBJECTIVE:  Patient with arrival to fluoro suite in   Total oral intake with no restrictions   Rehabilitation Potential: excellent    EDUCATION:  Learner: Patient  Education:  Reviewed results and recommendations of this evaluation, Reviewed diet and strategies, Reviewed signs, symptoms and risks of aspiration, and Reviewed recommendations for follow-up  Evaluation of Education: Verbalizes understanding, Demonstrates without assistance, and Family not present    PLAN:  No further speech therapy services indicated.    PATIENT GOAL:    Return to prior level of function.      Joceline German M.A., CCC-SLP [id number]

## 2025-06-24 NOTE — DISCHARGE SUMMARY
Hospitalist Discharge Note      Patient:  Esdras Marie    Unit/Bed:7K-06/006-A  YOB: 1955  MRN: [id number]   Acct: [id number]     PCP: No primary care provider on file.  Date of Admission: 6/20/2025  Discharge date: 6/24/2025 11:32 AM    Chief Complaint: sore  throat  Admission HPI: Esdras Marie is a 70 y.o. male with PMHx of tobacco abuse who presents to [facility] with sore throat.  Patient states that for the last 2 days he has been having a severe sore throat. He states that he has been not able to swallow well and the pain is a 5/10, but pain worsens to 10/10 when trying to swallow. Denies any sick contacts. Denies nausea, vomiting, diarrhea, chest pain and shortness of breath.      Subjective (past 24 hours): No acute events overnight.  Vital signs are stable.  He is tolerating regular diet.  He denies any acute complaints.  Eager to be discharged.  Medically stable for discharge today.    Discharge Assessment and Plan:     During admission the following problems were addressed:    Endobronchial lesion status post bronchoscopy on 6/23 with biopsy concerning for malignancy-findings on bronchoscopy showed endobronchial tumor noted completely occluding RUL partially invading into bronchus intermedius, necrotic appearing, tumor started to bleed slightly prior to biopsy.  Haemophilus influenza pneumonia/supraglottic swelling/strep pharyngitis-pneumonia panel positive for haemophilus influenza.     He presented with sore throat and admitted for strep pharyngitis as his rapid strep test was positive.  CT neck showed supraglottitis for which ENT was consulted.  He was incidentally found to have partially visualized RUL mass measuring 7.5 x 4.7 cm, obstructing the right upper lobe bronchus with postobstructive atelectasis on CT neck and thus CXR was obtained and revealed RUL opacification with postobstructive atelectasis.  This was followed up with CT chest  1.2 mg/dL    Calcium 8.7 (L) 8.8 - 10.2 mg/dL   Magnesium    Collection Time: 06/22/25  6:52 AM   Result Value Ref Range    Magnesium 2.0 1.6 - 2.6 mg/dL   CBC with Auto Differential    Collection Time: 06/22/25  6:52 AM   Result Value Ref Range    WBC 10.3 4.8 - 10.8 thou/mm3    RBC 3.72 (L) 4.70 - 6.10 mill/mm3    Hemoglobin 11.4 (L) 14.0 - 18.0 gm/dl    Hematocrit 35.2 (L) 42.0 - 52.0 %    MCV 94.6 (H) 80.0 - 94.0 fL    MCH 30.6 26.0 - 33.0 pg    MCHC 32.4 32.2 - 35.5 gm/dl    RDW-CV 13.1 11.5 - 14.5 %    RDW-SD 45.8 (H) 35.0 - 45.0 fL    Platelets 320 130 - 400 thou/mm3    MPV 10.0 9.4 - 12.4 fL    Seg Neutrophils 78.7 %    Lymphocytes 7.9 %    Monocytes % 12.3 %    Eosinophils 0.4 %    Basophils 0.3 %    Immature Granulocytes % 0.4 %    Neutrophils Absolute 8.1 (H) 1.8 - 7.7 thou/mm3    Lymphocytes Absolute 0.8 (L) 1.0 - 4.8 thou/mm3    Monocytes Absolute 1.3 0.4 - 1.3 thou/mm3    Eosinophils Absolute 0.0 0.0 - 0.4 thou/mm3    Basophils Absolute 0.0 0.0 - 0.1 thou/mm3    Immature Grans (Abs) 0.04 0.00 - 0.07 thou/mm3    nRBC 0 /100 wbc   Anion Gap    Collection Time: 06/22/25  6:52 AM   Result Value Ref Range    Anion Gap 15.0 8.0 - 16.0 meq/L   Glomerular Filtration Rate, Estimated    Collection Time: 06/22/25  6:52 AM   Result Value Ref Range    Est, Glom Filt Rate > 90 >60 ml/min/1.73m2   Basic Metabolic Panel    Collection Time: 06/23/25  7:36 AM   Result Value Ref Range    Sodium 137 135 - 145 meq/L    Potassium 4.3 3.5 - 5.2 meq/L    Chloride 100 98 - 111 meq/L    CO2 22 22 - 29 meq/L    Glucose 114 (H) 74 - 109 mg/dL    BUN 18 8 - 23 mg/dL    Creatinine 0.6 (L) 0.7 - 1.2 mg/dL    Calcium 9.4 8.8 - 10.2 mg/dL   Magnesium    Collection Time: 06/23/25  7:36 AM   Result Value Ref Range    Magnesium 2.2 1.6 - 2.6 mg/dL   CBC with Auto Differential    Collection Time: 06/23/25  7:36 AM   Result Value Ref Range    WBC 9.1 4.8 - 10.8 thou/mm3    RBC 4.22 (L) 4.70 - 6.10 mill/mm3    Hemoglobin 13.0 (L) 14.0 -

## 2025-06-24 NOTE — TELEPHONE ENCOUNTER
Pt was seen inpt for strep pharyngitis and arytenoid mass, he will be discharged from the hospital likely today or tomorrow. Please call opt to schedule follow up outpt with Dr. Flores in the next week to consider biopsy

## 2025-06-24 NOTE — PROGRESS NOTES
Department of Otolaryngology  Progress Note    Chief Complaint:  Strep pharyngitis, concern for malignancy      ASSESSMENT AND PLAN     Mucosal changes in the arytenoid and aryepiglottic folds suspicious for malignancy     - Pt endorsed that sore throat has improved significantly. He endorses very little concerns for dysphagia.  - Discussed results with speech, plan for regular diet. Pt would benefit from establishing with speech outpt if plan is for surgical intervention or chemo/radiation  - Initial consideration for inpt biopsy, however pt is eager to be discharged today. Will defer to primary on this, but at this time will arrange for outpt follow up and potential biopsy outpt. Pt will receive a call to schedule follow up with ENT.  - If pt stays admitted will reconsider biopsy plan.      Electronically signed by Shabana Chavira PA-C on 6/24/2025 at 9:38 AM

## 2025-06-24 NOTE — ANESTHESIA POSTPROCEDURE EVALUATION
Department of Anesthesiology  Postprocedure Note    Patient: Esdras Marie  MRN: [id number]  YOB: 1955  Date of evaluation: 6/24/2025    Procedure Summary       Date: 06/23/25 Room / Location: [place] [address] / [facility]    Anesthesia Start: 1318 Anesthesia Stop: 1417    Procedures:       BRONCHOSCOPY W. BIOPSY POSS APC, EBUS W/ BIOPSY      BRONCHOSCOPY ALVEOLAR LAVAGE Diagnosis:       Acute streptococcal pharyngitis      (Acute streptococcal pharyngitis [J02.0])    Surgeons: Alfonso Mayer DO Responsible Provider: Orville Solitario DO    Anesthesia Type: general ASA Status: 3            Anesthesia Type: No value filed.    Kelly Phase I: Kelly Score: 9    Kelly Phase II:      Anesthesia Post Evaluation    Comments: [facility]  POST-ANESTHESIA NOTE       Name:  Esdras Marie                                         Age:  70 y.o.  MRN:  [id number]      Last Vitals:  /75   Pulse 66   Temp 97.5 °F (36.4 °C) (Oral)   Resp 16   Ht 1.727 m (5' 8\")   Wt 59 kg (130 lb)   SpO2 97%   BMI 19.77 kg/m²   Patient Vitals in the past 4 hrs:  06/24/25 0730, BP:129/75, Temp:97.5 °F (36.4 °C), Temp src:Oral, Pulse:66, Resp:16, SpO2:97 %    Level of Consciousness:  Awake    Respiratory:  Stable    Oxygen Saturation:  Stable    Cardiovascular:  Stable    Hydration:  Adequate    PONV:  Stable    Post-op Pain:  Adequate analgesia    Post-op Assessment:  No apparent anesthetic complications    Additional Follow-Up / Treatment / Comment:  None    Orville Solitario DO  June 24, 2025   10:29 AM      No notable events documented.

## 2025-06-24 NOTE — PROGRESS NOTES
[facility] Infectious Disease         Progress Note      Esdras Marie  MEDICAL RECORD NUMBER:  [id number]  AGE: 70 y.o.   GENDER: male  : 1955  EPISODE DATE:  2025      Assessment:     Patient is a 70-year-old male who I am following for supraglottic swelling and right upper lobe mass.    CT imaging of the chest reviewed with evidence of right upper lobe collapse and a right hilar/mediastinal mass.  There is also evidence of local lymphadenopathy. I have discontinued cefepime.  The typical agents involved in either pneumonia or glottic inflammation would include Staphylococcus, strep (group A or pneumoniae), haemophilus.  Less likely to be anaerobic involvement though I have added metronidazole.    Biopsy performed by pulmonology and will be followed in outpatient for results with pulm service  Currently on ceftriaxone and metronidazole  Patient tolerating oral, recommend transition to Augmentin 875 twice daily  Discussed further with hospitalist service      Subjective:     Chief Complaint   Patient presents with    Pharyngitis         No acute events overnight, no new complaints or concerns this morning.       Patient Active Problem List   Diagnosis Code    Intractable low back pain M54.59    Back pain M54.9    Lung mass R91.8    Streptococcal sore throat J02.0    Tobacco use Z72.0    Supraglottitis without airway obstruction J04.30    Hemoptysis R04.2    History of incarceration Z78.9    Sore throat J02.9    Mediastinal lymphadenopathy R59.0             PAST MEDICAL HISTORY        Diagnosis Date    Ruptured disk        PAST SURGICAL HISTORY    Past Surgical History:   Procedure Laterality Date    BACK SURGERY      IR PERC ASPIRATION INTERVERT DISC  9/15/2021    IR PERC ASPIRATION INTERVERT DISC 9/15/2021 [facility]    TONSILLECTOMY         FAMILY HISTORY    Family History   Family history unknown: Yes       SOCIAL HISTORY    Social History     Tobacco Use    Smoking status:

## 2025-06-24 NOTE — TELEPHONE ENCOUNTER
----- Message from Ede Shrestha PA-C sent at 6/24/2025  9:13 AM EDT -----  Patient will need 1 week biopsy follow up and 3 month Pfts with follow up  -Will plan to order repeat imaging at office visit pending biopsy results     Thanks   -ede

## 2025-06-24 NOTE — PROGRESS NOTES
Patient discharged home with spouse. All paperwork given and instruction given. No concerns at this time.

## 2025-06-24 NOTE — TELEPHONE ENCOUNTER
Called patient and scheduled 1 week hfu f/u for bx results 7.1.25 @ 1:00 w Los . Patient verbalized his understanding. Additional f/u testing appts will be scheduled at check out of this appt on 7.1.25  Appt alonso ESPINOSA

## 2025-06-24 NOTE — PLAN OF CARE
Problem: Discharge Planning  Goal: Discharge to home or other facility with appropriate resources  Outcome: Completed     Problem: Pain  Goal: Verbalizes/displays adequate comfort level or baseline comfort level  Outcome: Completed     Problem: ABCDS Injury Assessment  Goal: Absence of physical injury  Outcome: Completed     Problem: Coping  Goal: Pt/Family able to verbalize concerns and demonstrate effective coping strategies  Description: INTERVENTIONS:  1. Assist patient/family to identify coping skills, available support systems and cultural and spiritual values  2. Provide emotional support, including active listening and acknowledgement of concerns of patient and caregivers  3. Reduce environmental stimuli, as able  4. Instruct patient/family in relaxation techniques, as appropriate  5. Assess for spiritual pain/suffering and initiate Spiritual Care, Psychosocial Clinical Specialist consults as needed  Outcome: Completed     Problem: Musculoskeletal - Adult  Goal: Return mobility to safest level of function  Outcome: Completed     Problem: Safety - Adult  Goal: Free from fall injury  Outcome: Completed   Care plan reviewed with patient and patient verbalized understanding of the plan of care and contribute to goal setting.

## 2025-06-25 LAB
BACTERIA BLD AEROBE CULT: NORMAL
BACTERIA BLD AEROBE CULT: NORMAL

## 2025-06-25 NOTE — TELEPHONE ENCOUNTER
Called and spoke to Molly. Scheduled patient.  Patient verbalized understanding and thanked me.

## 2025-06-25 NOTE — TELEPHONE ENCOUNTER
Molly is calling to schedule hfu appt. Unable to reach office at this time, VM left to call pt back

## 2025-06-26 LAB
BACTERIA SPEC RESP CULT: NORMAL
GRAM STN SPEC: NORMAL

## 2025-06-27 ENCOUNTER — OFFICE VISIT (OUTPATIENT)
Dept: PULMONOLOGY | Age: 70
End: 2025-06-27

## 2025-06-27 ENCOUNTER — TELEPHONE (OUTPATIENT)
Dept: PULMONOLOGY | Age: 70
End: 2025-06-27

## 2025-06-27 VITALS
BODY MASS INDEX: 22.37 KG/M2 | SYSTOLIC BLOOD PRESSURE: 126 MMHG | HEART RATE: 88 BPM | OXYGEN SATURATION: 99 % | HEIGHT: 68 IN | DIASTOLIC BLOOD PRESSURE: 82 MMHG | WEIGHT: 147.6 LBS | TEMPERATURE: 97.6 F

## 2025-06-27 DIAGNOSIS — C34.91 SQUAMOUS CELL CARCINOMA OF RIGHT LUNG (HCC): Primary | ICD-10-CM

## 2025-06-27 DIAGNOSIS — J02.0 STREPTOCOCCAL SORE THROAT: ICD-10-CM

## 2025-06-27 DIAGNOSIS — Z87.891 PERSONAL HISTORY OF TOBACCO USE, PRESENTING HAZARDS TO HEALTH: ICD-10-CM

## 2025-06-27 ASSESSMENT — ENCOUNTER SYMPTOMS
WHEEZING: 0
SORE THROAT: 0
SHORTNESS OF BREATH: 1
RHINORRHEA: 1
CHEST TIGHTNESS: 0
COUGH: 1

## 2025-06-27 NOTE — PROGRESS NOTES
[place] for Pulmonary Medicine and Sleep Medicine     Patient: ESDRAS GARCIA, 70 y.o.   : 1955  Date of encounter: 2025   Previously seen by Dr. Mazariegos     Subjective     Patient presents for 3 day hospital follow up.   Esdras presents to the pulmonary clinic with his wife and daughter today with no acute concerns or complaints, however does inquire of official biopsy results. Pt was discharged approximately 3 days ago after he was treated for strep and H. Flu infection. During this hospitalization he underwent biopsy of RUL opacity. Pt's wife reports the doctor who did the biopsy shared with them that the findings were very suspicious for cancer, thus they inquire of the official results. Pt otherwise reports chronic shortness of breath with exertion. He denies associated wheezing, chest tightness, chest pain. He does report chronic cough productive of white foamy sputum- reports 3 episodes of pinkish tinged mucous about 2 months ago. Denies significant allergy symptoms and does not currently use any inhaler therapy. Denies CP, chest tightness, shortness of breath, fever, chills today.  Chart review Esdras is a 70-year-old male smoker who establish care with Dr. Mazariegos and the pulmonary service in the hospital during his most recent hospitalization from -2025 for which he was found to have a right upper lobe lung mass with mediastinal lymphadenopathy. The mass measured 7.5 x 4.7 cm obstructing the right upper lobe bronchus with associated postobstructive atelectasis. Patient underwent biopsy with Dr. Gillis and presents today for biopsy results.    Progress History:   - Since last visit any new medical issues? No new issues since discharge from hospital 3 days ago  - New ER or hospital visits? Hospital stay as stated above.  - Any new or changes in medicines? antibiotics  - Using inhalers? None  - Are they helpful? N/a       There is no immunization history on file for this

## 2025-06-27 NOTE — TELEPHONE ENCOUNTER
Oncology paper and packet given to Tamera Stewart to address with patient during office visit on 6/27/25.    Patient information given to Marizol PEREZ to relay patient information to Jackie with [facility] oncology as Marizol PEREZ states she handles this transfer of information now and we are no longer keeping track of these patients on paper sheet.

## 2025-06-29 ENCOUNTER — RESULTS FOLLOW-UP (OUTPATIENT)
Dept: PULMONOLOGY | Age: 70
End: 2025-06-29

## 2025-06-30 ENCOUNTER — TELEPHONE (OUTPATIENT)
Dept: PULMONOLOGY | Age: 70
End: 2025-06-30

## 2025-06-30 DIAGNOSIS — C34.91 SQUAMOUS CELL CARCINOMA OF RIGHT LUNG (HCC): Primary | ICD-10-CM

## 2025-06-30 LAB
AFB CULTURE & SMEAR: NORMAL
FUNGUS SPEC CULT: NORMAL
FUNGUS SPEC FUNGUS STN: NORMAL

## 2025-06-30 NOTE — TELEPHONE ENCOUNTER
Shared medical services ([facility] PET) called in stating that the diagnosis is not billable/covered due to lack of specification of which lobe of which lung. She states that the following code is billable/covered. Please advise regarding updated order. Thanks.    Fax #: [phone]    C34.11 - RUL Lung cancer

## 2025-07-01 ENCOUNTER — OFFICE VISIT (OUTPATIENT)
Dept: ENT CLINIC | Age: 70
End: 2025-07-01
Payer: MEDICARE

## 2025-07-01 VITALS
WEIGHT: 145.5 LBS | HEIGHT: 68 IN | TEMPERATURE: 97.3 F | RESPIRATION RATE: 16 BRPM | SYSTOLIC BLOOD PRESSURE: 122 MMHG | DIASTOLIC BLOOD PRESSURE: 74 MMHG | BODY MASS INDEX: 22.05 KG/M2 | OXYGEN SATURATION: 96 % | HEART RATE: 77 BPM

## 2025-07-01 DIAGNOSIS — J04.30 SUPRAGLOTTITIS WITHOUT AIRWAY OBSTRUCTION: Primary | ICD-10-CM

## 2025-07-01 PROCEDURE — G8420 CALC BMI NORM PARAMETERS: HCPCS | Performed by: OTOLARYNGOLOGY

## 2025-07-01 PROCEDURE — 99202 OFFICE O/P NEW SF 15 MIN: CPT | Performed by: OTOLARYNGOLOGY

## 2025-07-01 PROCEDURE — G8427 DOCREV CUR MEDS BY ELIG CLIN: HCPCS | Performed by: OTOLARYNGOLOGY

## 2025-07-01 PROCEDURE — 1159F MED LIST DOCD IN RCRD: CPT | Performed by: OTOLARYNGOLOGY

## 2025-07-01 PROCEDURE — 31575 DIAGNOSTIC LARYNGOSCOPY: CPT | Performed by: OTOLARYNGOLOGY

## 2025-07-01 PROCEDURE — 1111F DSCHRG MED/CURRENT MED MERGE: CPT | Performed by: OTOLARYNGOLOGY

## 2025-07-01 PROCEDURE — 3017F COLORECTAL CA SCREEN DOC REV: CPT | Performed by: OTOLARYNGOLOGY

## 2025-07-01 PROCEDURE — 1124F ACP DISCUSS-NO DSCNMKR DOCD: CPT | Performed by: OTOLARYNGOLOGY

## 2025-07-01 PROCEDURE — 4004F PT TOBACCO SCREEN RCVD TLK: CPT | Performed by: OTOLARYNGOLOGY

## 2025-07-01 NOTE — TELEPHONE ENCOUNTER
[facility] Medical Service called regarding this again, can you please place a new order with the below diagnosis code?   Thanks!

## 2025-07-01 NOTE — PROGRESS NOTES
non-obstructed.  Palate elevation and closure:  Complete  Pharyngeal strength: Grossly present  Base of tongue, vallecula, Lingual & laryngeal surface of epiglottis, and piriform sinuses clear. No RP bulging.   Penetration noted: none.  Aspiration noted:none.  Secretions pooling: None  Subglottis: clear  False vocal folds w/o masses/lesions/mucosal ulceration.   Compression/ hyperfunction: none.  True vocal folds w/o masses/lesions/scar/ mucosal ulceration, were bilaterally mobile, without significant glottic gap.   Arytenoids:mucosal changes that extend to the aryepiglottic fold- slightly improved in comparison with the previous scope.                               Condition:  Stable.  Patient tolerated procedure well.    Complications:  None    Data:  All of the past medical history, past surgical history, family history,social history, allergies and current medications were reviewed with the patient.  Assessment & Plan   Assessment & Plan   Diagnoses and all orders for this visit:     Diagnosis Orders   1. Supraglottitis without airway obstruction  LARYNGOSCOPY,FLEX FIBER,DIAGNOSTIC        Right upper lobe mass, positive for squamous cell carcinoma, and arytenoids/aryepiglottic fold mucosal changes:   PET CT and MRI brain ordered.   Finish Abx.  We will re-evaluate in 3 weeks after completion of PET scan.          The findings were explained and his questions were answered.        No follow-ups on file.         Ambika Flores MD    **This report has been created using voice recognition software. It may contain minor errors which are inherent in voice recognition technology.**

## 2025-07-02 NOTE — ADT AUTH CERT
Utilization Reviews       6/23  IP D2 by Julia Lebron   Last updated by Julia Lebron on 7/1/2025 1537     Review Status Created By   In Primary Julia Lebron       Review Type   --      Criteria Review   DATE: 6/23/25  TYPE OF BED: Service: Medical  Level of Care: Acute     Patient has or is expected to exceed 2 midnights of medically necessary care.            RELEVANT BASELINES: (lab values, vitals, o2 amount/delivery, etc.)  -smokes about half a pack of cigarettes a day      PERTINENT UPDATES:  -Start 0.9 NS at 75 mL/h as n.p.o. for bronchoscopy today  -Follow pending cultures and pathology  -Follow quantiferon gold  -Continue antibiotics per primary ID. Currently ceftriaxone 2 g daily and metronidazole 500 twice daily  -Duonebs as needed   -Acapella/incentive spirometer    -MBS pending per primary   -Patient declines NRT  -Bronchoscopy Procedure     VITALS:  97.6  18  71  141/77, 165/78, 136/67  96% on RA        ABNL/PERTINENT LABS/RADIOLOGY/DIAGNOSTIC STUDIES:  06/23/25 07:36  Creatinine: 0.6 (L)  Glucose: 114 (H)  WBC: 9.1  RBC: 4.22 (L)  Hemoglobin Quant: 13.0 (L)  Hematocrit: 39.6 (L)  Neutrophils Absolute: 8.3 (H)  Lymphocytes Absolute: 0.6 (L)  Monocytes Absolute: 0.2 (L)        PHYSICAL EXAM:  H&N: normocephalic, anicteric, no conjunctivitis, no exophthalmos, nose and ears appear normal, trachea mid line, no stridor,  Chest:  fairly good air entry, no wheezes,    Heart: normal heart sounds, regular rate and rhythm, no gallops or rubs,  Abdomen: BS present, non-tender, no masses or organomegaly detected,   Extremities: no peripheral edema, no cyanosis. no oncholysis. Skin: no rash, no jaundice,   CNS: grossly normal without cranial nerve deficits, no abnormal coordination or tone,      MD CONSULTS/ASSESSMENT AND PLAN:  INFECTIOUS DISEASE:  Pulmonology following and plan for bronchoscopy and biopsy to be performed June 23/today  Currently on ceftriaxone and metronidazole  Will follow

## 2025-07-07 PROBLEM — F17.200 NICOTINE DEPENDENCE: Status: ACTIVE | Noted: 2021-10-04

## 2025-07-07 PROBLEM — M51.379 DEGENERATION OF LUMBOSACRAL INTERVERTEBRAL DISC: Status: ACTIVE | Noted: 2025-07-07

## 2025-07-07 PROBLEM — K08.89 TOOTHACHE: Status: ACTIVE | Noted: 2021-06-07

## 2025-07-07 PROBLEM — M54.16 RADICULOPATHY, LUMBAR REGION: Status: ACTIVE | Noted: 2025-07-07

## 2025-07-07 PROBLEM — M43.10 ACQUIRED SPONDYLOLISTHESIS: Status: ACTIVE | Noted: 2025-07-07

## 2025-07-07 LAB — AFB CULTURE & SMEAR: NORMAL

## 2025-07-09 ENCOUNTER — OFFICE VISIT (OUTPATIENT)
Dept: INTERNAL MEDICINE CLINIC | Age: 70
End: 2025-07-09
Payer: MEDICARE

## 2025-07-09 VITALS
SYSTOLIC BLOOD PRESSURE: 130 MMHG | HEIGHT: 68 IN | BODY MASS INDEX: 21.49 KG/M2 | HEART RATE: 80 BPM | DIASTOLIC BLOOD PRESSURE: 76 MMHG | TEMPERATURE: 97.8 F | WEIGHT: 141.8 LBS

## 2025-07-09 DIAGNOSIS — Z72.0 TOBACCO USE: ICD-10-CM

## 2025-07-09 DIAGNOSIS — R91.8 LUNG MASS: ICD-10-CM

## 2025-07-09 DIAGNOSIS — C34.90 MALIGNANT NEOPLASM OF LUNG, UNSPECIFIED LATERALITY, UNSPECIFIED PART OF LUNG (HCC): ICD-10-CM

## 2025-07-09 DIAGNOSIS — Z13.220 SCREENING CHOLESTEROL LEVEL: ICD-10-CM

## 2025-07-09 DIAGNOSIS — Z13.9 SCREENING DUE: Primary | ICD-10-CM

## 2025-07-09 PROCEDURE — 99215 OFFICE O/P EST HI 40 MIN: CPT

## 2025-07-09 PROCEDURE — 1111F DSCHRG MED/CURRENT MED MERGE: CPT

## 2025-07-09 PROCEDURE — G8427 DOCREV CUR MEDS BY ELIG CLIN: HCPCS

## 2025-07-09 PROCEDURE — 3017F COLORECTAL CA SCREEN DOC REV: CPT

## 2025-07-09 PROCEDURE — 4004F PT TOBACCO SCREEN RCVD TLK: CPT

## 2025-07-09 PROCEDURE — 1124F ACP DISCUSS-NO DSCNMKR DOCD: CPT

## 2025-07-09 PROCEDURE — G8420 CALC BMI NORM PARAMETERS: HCPCS

## 2025-07-09 RX ORDER — IBUPROFEN 200 MG
400 TABLET ORAL EVERY 6 HOURS PRN
COMMUNITY
End: 2025-07-09

## 2025-07-09 ASSESSMENT — PATIENT HEALTH QUESTIONNAIRE - PHQ9
SUM OF ALL RESPONSES TO PHQ QUESTIONS 1-9: 0
SUM OF ALL RESPONSES TO PHQ QUESTIONS 1-9: 0
2. FEELING DOWN, DEPRESSED OR HOPELESS: NOT AT ALL
SUM OF ALL RESPONSES TO PHQ QUESTIONS 1-9: 0
SUM OF ALL RESPONSES TO PHQ QUESTIONS 1-9: 0
1. LITTLE INTEREST OR PLEASURE IN DOING THINGS: NOT AT ALL

## 2025-07-09 NOTE — PROGRESS NOTES
Internal Medicine  Resident Clinic Progress Note    Patient:  Esdras Marie, 70 y.o. male  : 1955  Unit: Room/bed info not found  MRN:  [id number]     Acct:      PCP:  Mars Hunter DO    Date of Service:  25      ASSESSMENT / PLAN:  Lung mass, history of hemoptysis. s/p biopsy 2025 showing endobronchial tumor occluding right upper lobe partially invading bronchus intermedius. Establishing care with Dr. De Oliveira, needed referral.   Tobacco use.  2.5 packs/day smoking history since a teenager.  Code Status.  Currently remain full code.  Discussed with patient, and his daughter.  ACP documentation.  Information given to patient and daughter regarding setting up his spouse, to home which she is not legally , as primary decision-maker.  Right shoulder pain.  Treats with NSAIDs occasionally.  Vaccinations.  Generally, refuses all vaccinations.  Discussed importance of Pneumovax in context of his lung cancer, COPD.  Routine screening.  Refusing colonoscopy at this time.    Follow up: Dr. De Oliveira recommendations, PET scan, consider routine lab work.  Follow-up lipid panel.    Reason for Visit: Establishing care.    Subjective:  Follows with Pulm. Has PET scan. Smoked since a teen. Roughly 1 ppd. PET yestday, MRI brain next Friday, PFT sep 24. Bx 2025 with squamous cell carcinoma. Wants to follow with Dr. De Oliveira's office.  He presents with his daughter.  He has no ACP documentation in place.  He would like his wife, with whom he has been with for over 30 years, but is not legally  to, to be his primary decision-maker.  Information given to him and his daughter regarding establishing this formally.  His weight has been stable.  He denies any recent weight loss.  Continues to smoke approximately three-quarter pack per day.  Smoked 1 pack/day since being a teenager.  He has never had a colonoscopy.  Per the patient, outside of work, has never seen a doctor since being a young adult.

## 2025-07-10 ENCOUNTER — OFFICE VISIT (OUTPATIENT)
Dept: INFECTIOUS DISEASES | Age: 70
End: 2025-07-10
Payer: MEDICARE

## 2025-07-10 VITALS
HEIGHT: 68 IN | HEART RATE: 83 BPM | TEMPERATURE: 97.6 F | OXYGEN SATURATION: 98 % | DIASTOLIC BLOOD PRESSURE: 62 MMHG | SYSTOLIC BLOOD PRESSURE: 129 MMHG | BODY MASS INDEX: 21.64 KG/M2 | WEIGHT: 142.8 LBS | RESPIRATION RATE: 18 BRPM

## 2025-07-10 DIAGNOSIS — R59.0 MEDIASTINAL LYMPHADENOPATHY: Primary | ICD-10-CM

## 2025-07-10 DIAGNOSIS — R91.8 LUNG MASS: ICD-10-CM

## 2025-07-10 DIAGNOSIS — J04.30 SUPRAGLOTTITIS WITHOUT AIRWAY OBSTRUCTION: ICD-10-CM

## 2025-07-10 LAB
FUNGUS SPEC CULT: ABNORMAL
FUNGUS SPEC FUNGUS STN: ABNORMAL
ORGANISM: ABNORMAL
ORGANISM: ABNORMAL

## 2025-07-10 PROCEDURE — 1111F DSCHRG MED/CURRENT MED MERGE: CPT | Performed by: STUDENT IN AN ORGANIZED HEALTH CARE EDUCATION/TRAINING PROGRAM

## 2025-07-10 PROCEDURE — 1125F AMNT PAIN NOTED PAIN PRSNT: CPT | Performed by: STUDENT IN AN ORGANIZED HEALTH CARE EDUCATION/TRAINING PROGRAM

## 2025-07-10 PROCEDURE — G8420 CALC BMI NORM PARAMETERS: HCPCS | Performed by: STUDENT IN AN ORGANIZED HEALTH CARE EDUCATION/TRAINING PROGRAM

## 2025-07-10 PROCEDURE — G8427 DOCREV CUR MEDS BY ELIG CLIN: HCPCS | Performed by: STUDENT IN AN ORGANIZED HEALTH CARE EDUCATION/TRAINING PROGRAM

## 2025-07-10 PROCEDURE — 1124F ACP DISCUSS-NO DSCNMKR DOCD: CPT | Performed by: STUDENT IN AN ORGANIZED HEALTH CARE EDUCATION/TRAINING PROGRAM

## 2025-07-10 PROCEDURE — 99214 OFFICE O/P EST MOD 30 MIN: CPT | Performed by: STUDENT IN AN ORGANIZED HEALTH CARE EDUCATION/TRAINING PROGRAM

## 2025-07-10 PROCEDURE — 4004F PT TOBACCO SCREEN RCVD TLK: CPT | Performed by: STUDENT IN AN ORGANIZED HEALTH CARE EDUCATION/TRAINING PROGRAM

## 2025-07-10 PROCEDURE — 3017F COLORECTAL CA SCREEN DOC REV: CPT | Performed by: STUDENT IN AN ORGANIZED HEALTH CARE EDUCATION/TRAINING PROGRAM

## 2025-07-10 NOTE — PROGRESS NOTES
Progress note: Infectious diseases    Patient - Esdras Marie  Age - 70 y.o.      - 1955      Date- 7/10/2025        ASSESSMENT/PLAN   1. Mediastinal lymphadenopathy    Patient is a 70-year-old male who I am consulted for right upper lobe mass.  This patient presents as a hospital follow-up.    I had previously evaluated this patient due to supraglottic swelling and right upper lobe mass.  There is no identification of positive cultures that would indicate an untreated pathogen.  I would recommend completing course of Augmentin as previously noted.  This patient requires no further infectious disease follow-up.  I have reviewed cytology results which are consistent with malignancy.  No evidence of mold or nontuberculous mycobacterial infection.  Infectious disease service will sign off.    2. Supraglottitis without airway obstruction      3. Lung mass          SUBJECTIVE:     Patient is a 70-year-old male who I am following for supraglottic swelling and a right upper lobe mass.    Patient had previously presented in late  with complaints of increased difficulty with swallowing.  CT imaging initially obtained of the neck and then subsequently of the chest demonstrated a right mediastinal mass with evidence of local lymphadenopathy.  Patient underwent biopsy with pulmonology service and was transition to therapy with Augmentin 875 twice daily to cover for potential anaerobic/streptococcal infection.    On review of cytology, this is noted to be positive for malignancy with findings consistent with squamous cell carcinoma.  Reviewed other cultures including acid-fast, fungal and aerobic/anaerobic, no evidence of other infectious etiology at this time.  Okay to discontinue antimicrobials as this is consistent with malignancy alone.      OBJECTIVE   VITALS   /62 (BP Site: Left Upper Arm, Patient Position:

## 2025-07-15 LAB
AFB CULTURE & SMEAR: NORMAL
FUNGUS SPEC CULT: ABNORMAL
FUNGUS SPEC CULT: ABNORMAL
FUNGUS SPEC FUNGUS STN: ABNORMAL
ORGANISM: ABNORMAL
ORGANISM: ABNORMAL

## 2025-07-17 LAB
PD-L1 BLOCK ID: NORMAL
PD-L1 TISSUE SOURCE: NORMAL

## 2025-07-18 ENCOUNTER — HOSPITAL ENCOUNTER (OUTPATIENT)
Age: 70
Discharge: HOME | End: 2025-07-18
Payer: MEDICARE

## 2025-07-18 ENCOUNTER — HOSPITAL ENCOUNTER (OUTPATIENT)
Dept: CT IMAGING | Age: 70
Discharge: HOME | End: 2025-07-18
Attending: RADIOLOGY

## 2025-07-18 ENCOUNTER — HOSPITAL ENCOUNTER (OUTPATIENT)
Dept: MRI IMAGING | Age: 70
Discharge: HOME | End: 2025-07-18
Payer: MEDICARE

## 2025-07-18 DIAGNOSIS — Z13.220 SCREENING CHOLESTEROL LEVEL: ICD-10-CM

## 2025-07-18 DIAGNOSIS — Z13.9 SCREENING DUE: ICD-10-CM

## 2025-07-18 DIAGNOSIS — Z00.6 EXAMINATION FOR NORMAL COMPARISON OR CONTROL IN CLINICAL RESEARCH: ICD-10-CM

## 2025-07-18 DIAGNOSIS — C34.91 SQUAMOUS CELL CARCINOMA OF RIGHT LUNG (HCC): ICD-10-CM

## 2025-07-18 LAB
CHOLESTEROL, FASTING: 176 MG/DL (ref 100–199)
HDLC SERPL-MCNC: 54 MG/DL
LDLC SERPL CALC-MCNC: 111 MG/DL
TRIGLYCERIDE, FASTING: 53 MG/DL (ref 0–199)

## 2025-07-18 PROCEDURE — 70553 MRI BRAIN STEM W/O & W/DYE: CPT

## 2025-07-18 PROCEDURE — 80061 LIPID PANEL: CPT

## 2025-07-18 PROCEDURE — 6360000004 HC RX CONTRAST MEDICATION

## 2025-07-18 PROCEDURE — A9579 GAD-BASE MR CONTRAST NOS,1ML: HCPCS

## 2025-07-18 RX ORDER — GADOTERIDOL 279.3 MG/ML
15 INJECTION INTRAVENOUS
Status: COMPLETED | OUTPATIENT
Start: 2025-07-18 | End: 2025-07-18

## 2025-07-18 RX ADMIN — GADOTERIDOL 15 ML: 279.3 INJECTION, SOLUTION INTRAVENOUS at 09:12

## 2025-07-19 LAB — MISC. #1 REFERENCE GROUP TEST: NORMAL

## 2025-07-21 ENCOUNTER — HOSPITAL ENCOUNTER (OUTPATIENT)
Dept: RADIATION ONCOLOGY | Age: 70
Discharge: HOME | End: 2025-07-21
Payer: MEDICARE

## 2025-07-21 ENCOUNTER — CLINICAL DOCUMENTATION (OUTPATIENT)
Dept: CASE MANAGEMENT | Age: 70
End: 2025-07-21

## 2025-07-21 VITALS
TEMPERATURE: 98 F | HEIGHT: 68 IN | SYSTOLIC BLOOD PRESSURE: 141 MMHG | WEIGHT: 142 LBS | RESPIRATION RATE: 18 BRPM | HEART RATE: 76 BPM | BODY MASS INDEX: 21.52 KG/M2 | OXYGEN SATURATION: 96 % | DIASTOLIC BLOOD PRESSURE: 72 MMHG

## 2025-07-21 DIAGNOSIS — C34.91 SQUAMOUS CELL CARCINOMA OF RIGHT LUNG (HCC): ICD-10-CM

## 2025-07-21 LAB
FUNGUS SPEC CULT: ABNORMAL
FUNGUS SPEC CULT: ABNORMAL
FUNGUS SPEC FUNGUS STN: ABNORMAL
ORGANISM: ABNORMAL
ORGANISM: ABNORMAL

## 2025-07-21 PROCEDURE — 99202 OFFICE O/P NEW SF 15 MIN: CPT | Performed by: RADIOLOGY

## 2025-07-21 ASSESSMENT — ENCOUNTER SYMPTOMS
ABDOMINAL PAIN: 0
CHEST TIGHTNESS: 0
BACK PAIN: 0
COUGH: 1
APNEA: 0
VOMITING: 0
TROUBLE SWALLOWING: 0
NAUSEA: 0
BLOOD IN STOOL: 0
WHEEZING: 0
SHORTNESS OF BREATH: 1
SORE THROAT: 0

## 2025-07-21 ASSESSMENT — PAIN DESCRIPTION - LOCATION: LOCATION: SHOULDER

## 2025-07-21 ASSESSMENT — PAIN DESCRIPTION - ORIENTATION: ORIENTATION: RIGHT

## 2025-07-21 ASSESSMENT — PAIN SCALES - GENERAL: PAINLEVEL_OUTOF10: 2

## 2025-07-21 NOTE — PROGRESS NOTES
Radiation Oncology Consultation  Encounter Date: 2025     Mr. Esdras Marie is a 70 y.o. male  : 1955  MRN: [id number]  Acct Number: [id number]  Requesting Provider: Dr. WAI De Oliveira    Reason for request: ***      CONSULTANT: Yaneth Chambers PhD, MD      CHIEF COMPLAINT:   DIAGNOSIS: ***      ASSESSMENT:  ***      PLAN:  ***      HISTORY OF PRESENT ILLNESS:   ***        Past Medical History:   Diagnosis Date    Ruptured disk     Squamous cell carcinoma lung (HCC) 2025     Past Surgical History:   Procedure Laterality Date    BACK SURGERY      BRONCHOSCOPY N/A 2025    BRONCHOSCOPY W. BIOPSY POSS APC, EBUS W/ BIOPSY performed by Alfonso Mayer DO at [facility] ENDOSCOPY    BRONCHOSCOPY  2025    BRONCHOSCOPY ALVEOLAR LAVAGE performed by Alfonso Mayer DO at [facility] ENDOSCOPY    IR PERC ASPIRATION INTERVERT DISC  9/15/2021    IR PERC ASPIRATION INTERVERT DISC 9/15/2021 [facility] SPECIAL PROCEDURES    TONSILLECTOMY         Family History   Family history unknown: Yes       Social History     Socioeconomic History    Marital status:      Spouse name: Not on file    Number of children: Not on file    Years of education: Not on file    Highest education level: Not on file   Occupational History    Not on file   Tobacco Use    Smoking status: Every Day     Current packs/day: 2.00     Average packs/day: 2.0 packs/day for 55.6 years (111.1 ttl pk-yrs)     Types: Cigarettes     Start date:     Smokeless tobacco: Former     Types: Chew     Quit date:     Tobacco comments:     1pack every 2-2.5 days. 25 - Up to 1 Pack per day now. Refused resources   Vaping Use    Vaping status: Never Used   Substance and Sexual Activity    Alcohol use: Yes     Comment: very little    Drug use: Not Currently     Types: IV, Opiates , Marijuana (Weed)    Sexual activity: Not Currently     Partners: Female   Other Topics Concern    Not on file   Social History Narrative    Not on file

## 2025-07-22 ENCOUNTER — TELEPHONE (OUTPATIENT)
Dept: PULMONOLOGY | Age: 70
End: 2025-07-22

## 2025-07-22 ENCOUNTER — OFFICE VISIT (OUTPATIENT)
Dept: PULMONOLOGY | Age: 70
End: 2025-07-22
Payer: MEDICARE

## 2025-07-22 VITALS
WEIGHT: 142 LBS | HEIGHT: 68 IN | BODY MASS INDEX: 21.52 KG/M2 | TEMPERATURE: 97.7 F | OXYGEN SATURATION: 97 % | DIASTOLIC BLOOD PRESSURE: 78 MMHG | SYSTOLIC BLOOD PRESSURE: 132 MMHG | HEART RATE: 91 BPM

## 2025-07-22 DIAGNOSIS — C34.91 SQUAMOUS CELL CARCINOMA OF RIGHT LUNG (HCC): Primary | ICD-10-CM

## 2025-07-22 DIAGNOSIS — J04.30 SUPRAGLOTTITIS WITHOUT AIRWAY OBSTRUCTION: ICD-10-CM

## 2025-07-22 DIAGNOSIS — Z87.891 PERSONAL HISTORY OF TOBACCO USE, PRESENTING HAZARDS TO HEALTH: ICD-10-CM

## 2025-07-22 PROBLEM — J02.9 SORE THROAT: Status: RESOLVED | Noted: 2025-06-22 | Resolved: 2025-07-22

## 2025-07-22 PROCEDURE — G8427 DOCREV CUR MEDS BY ELIG CLIN: HCPCS

## 2025-07-22 PROCEDURE — 1111F DSCHRG MED/CURRENT MED MERGE: CPT

## 2025-07-22 PROCEDURE — 4004F PT TOBACCO SCREEN RCVD TLK: CPT

## 2025-07-22 PROCEDURE — 1159F MED LIST DOCD IN RCRD: CPT

## 2025-07-22 PROCEDURE — 99214 OFFICE O/P EST MOD 30 MIN: CPT

## 2025-07-22 PROCEDURE — 1124F ACP DISCUSS-NO DSCNMKR DOCD: CPT

## 2025-07-22 PROCEDURE — 3017F COLORECTAL CA SCREEN DOC REV: CPT

## 2025-07-22 PROCEDURE — G8420 CALC BMI NORM PARAMETERS: HCPCS

## 2025-07-22 ASSESSMENT — ENCOUNTER SYMPTOMS
COUGH: 1
CONSTIPATION: 0
VOMITING: 0
SINUS PRESSURE: 0
COLOR CHANGE: 0
CHEST TIGHTNESS: 0
CHOKING: 0
NAUSEA: 0
VOICE CHANGE: 0
SHORTNESS OF BREATH: 0
SORE THROAT: 1
WHEEZING: 0
DIARRHEA: 0
APNEA: 0

## 2025-07-22 NOTE — PATIENT INSTRUCTIONS
-Following with heme oncology Dr Caceres and radiation oncology  -PFTs scheduled for 9/24   -Following with ENT 7/24

## 2025-07-22 NOTE — PROGRESS NOTES
[facility]    Patient: NBA GARCIA, 70 y.o.   : 1955  2025    Previously seen by Tamera Stewart PA-C     Subjective   No chief complaint on file.       VIRGINIA Dewitt is here for follow up for pulm with cancer workup.      Patient had previously presented in late  with complaints of increased difficulty with swallowing.  CT imaging initially obtained of the neck and then subsequently of the chest demonstrated a right mediastinal mass with evidence of local lymphadenopathy.  Patient underwent biopsy with pulmonology service Endobronchial lesion status post bronchoscopy on  with biopsy concerning for malignancy-findings on bronchoscopy showed endobronchial tumor noted completely occluding RUL partially invading into bronchus intermedius, necrotic appearing, tumor started to bleed slightly prior to biopsy. Also had Haemophilus influenza pneumonia/supraglottic swelling/strep pharyngitis-pneumonia panel positive for haemophilus influenza.    and was transition to therapy with Augmentin 875 twice daily to cover for potential anaerobic/streptococcal infection.     On review of cytology, this is noted to be positive for malignancy with findings consistent with squamous cell carcinoma.  Reviewed other cultures including acid-fast, fungal and aerobic/anaerobic, no evidence of other infectious etiology at this time.    Antimicrobials discontinued by ID at that time       Overall patient reports respiratory symptoms have been stable since last appointment. Patient reports not currently using any inhalers  Patient reports minimal physical limitation due to respiratory symptoms.   Denies hemoptysis, night sweats or weight loss  Reports no shortness of breath  Cough is chronic and un changed, patient says it doesn't bother him    Following with rad onc and seeing Dr maxwell   Planning to start chemo this week and radiation in a few weeks     We discussed PET and MRI

## 2025-07-23 LAB
IMMUNE STAIN STUDY: NORMAL
PD-L1 BLOCK ADEQUACY: ADEQUATE
PD-L1 BY 22C3 TISS QL IMSTN: NORMAL
PD-L1 TUMOR PROPORTION SCORE: NORMAL
SPECIMEN SOURCE: NORMAL
TISSUE BLOCK ID SPEC: NORMAL

## 2025-07-23 NOTE — PROGRESS NOTES
Name: Esdras Marie  : 1955  MRN: [id number]    Oncology Navigation- Initial Note:    Intake-  Contact Type: Radiation Oncology  Daughter Belia accompanied consultation  Dr. 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 Not interested in quitting      Patient needs and barriers to care: Coordination of Care, Knowledge deficit, and Symptom Management     Referral Source: Outpatient    Receptive to Advanced Care Planning/ Palliative Care:  deferred    Interventions-              Oncologist Plan of Care:                -MD discussed disease pathology & therapeutic recommendations               -informed MRI brain- negative for cancer               -reviewed NCCN guidelines                -plan 10 palliative radiation tx (chemotherapy administer Dr. De Oliveira office)                -reviewed s/e- signed consent               -CT/sim 2025                 Education davy offered smoking cessation information      General Interventions: Introduce myself to patient & family as a nurse navigator- explained the role & process of nurse navigation. Welcome folder reviewed & given;                                                      including contact information.         Referrals: n/a     Biopsy site status: complete     Continuum of Care: Diagnosis/Active Treatment    Notes: Davy following to assist & support as needed     Electronically signed by Micaela Mendez RN on 2025 at 8:46 AM

## 2025-07-24 ENCOUNTER — SOCIAL WORK (OUTPATIENT)
Dept: RADIATION ONCOLOGY | Age: 70
End: 2025-07-24

## 2025-07-24 ENCOUNTER — HOSPITAL ENCOUNTER (OUTPATIENT)
Dept: CT IMAGING | Age: 70
Discharge: HOME | End: 2025-07-24

## 2025-07-24 ENCOUNTER — HOSPITAL ENCOUNTER (OUTPATIENT)
Dept: RADIATION ONCOLOGY | Age: 70
Discharge: HOME | End: 2025-07-24
Payer: MEDICARE

## 2025-07-24 ENCOUNTER — TELEPHONE (OUTPATIENT)
Dept: INFECTIOUS DISEASES | Age: 70
End: 2025-07-24

## 2025-07-24 ENCOUNTER — OFFICE VISIT (OUTPATIENT)
Dept: ENT CLINIC | Age: 70
End: 2025-07-24
Payer: MEDICARE

## 2025-07-24 VITALS
OXYGEN SATURATION: 97 % | SYSTOLIC BLOOD PRESSURE: 110 MMHG | BODY MASS INDEX: 21.61 KG/M2 | HEIGHT: 68 IN | TEMPERATURE: 98 F | DIASTOLIC BLOOD PRESSURE: 66 MMHG | HEART RATE: 77 BPM | WEIGHT: 142.6 LBS

## 2025-07-24 DIAGNOSIS — J04.30 SUPRAGLOTTITIS WITHOUT AIRWAY OBSTRUCTION: Primary | ICD-10-CM

## 2025-07-24 DIAGNOSIS — C34.11 MALIGNANT NEOPLASM OF UPPER LOBE, RIGHT BRONCHUS OR LUNG (HCC): ICD-10-CM

## 2025-07-24 PROCEDURE — 1111F DSCHRG MED/CURRENT MED MERGE: CPT | Performed by: OTOLARYNGOLOGY

## 2025-07-24 PROCEDURE — 4004F PT TOBACCO SCREEN RCVD TLK: CPT | Performed by: OTOLARYNGOLOGY

## 2025-07-24 PROCEDURE — 3017F COLORECTAL CA SCREEN DOC REV: CPT | Performed by: OTOLARYNGOLOGY

## 2025-07-24 PROCEDURE — G8427 DOCREV CUR MEDS BY ELIG CLIN: HCPCS | Performed by: OTOLARYNGOLOGY

## 2025-07-24 PROCEDURE — 77470 SPECIAL RADIATION TREATMENT: CPT | Performed by: RADIOLOGY

## 2025-07-24 PROCEDURE — 1124F ACP DISCUSS-NO DSCNMKR DOCD: CPT | Performed by: OTOLARYNGOLOGY

## 2025-07-24 PROCEDURE — 77334 RADIATION TREATMENT AID(S): CPT | Performed by: RADIOLOGY

## 2025-07-24 PROCEDURE — 31575 DIAGNOSTIC LARYNGOSCOPY: CPT | Performed by: OTOLARYNGOLOGY

## 2025-07-24 PROCEDURE — 1159F MED LIST DOCD IN RCRD: CPT | Performed by: OTOLARYNGOLOGY

## 2025-07-24 PROCEDURE — 3209999900 CT GUIDE RADIATION THERAPY NO CHARGE

## 2025-07-24 PROCEDURE — G8420 CALC BMI NORM PARAMETERS: HCPCS | Performed by: OTOLARYNGOLOGY

## 2025-07-24 PROCEDURE — 99212 OFFICE O/P EST SF 10 MIN: CPT | Performed by: OTOLARYNGOLOGY

## 2025-07-24 NOTE — PROGRESS NOTES
[facility] [place]  [facility], NOSE AND TH[facility]  [address]  [address]  [facility] [address]  Dept: [phone]  Dept Fax: [phone]  Loc: [phone]    Esdras Marie is a 70 y.o. male who was referred byNo ref. provider found for:  Chief Complaint   Patient presents with    Follow-up     Patient here for 3 week follow up.    .    HPI:     Esdras Marie is a 70 y.o. male who presents today for hospital admission follow up. Esdras Marie is a 70 y.o. male with PMHx of tobacco abuse who presents to [facility] with sore throat. Patient states that for the last 2 days he has been having a severe sore throat. He states that he has been not able to swallow well and the pain is a 5/10, but pain worsens to 10/10 when trying to swallow. Denies any sick contacts. Denies nausea, vomiting, diarrhea, chest pain and shortness of breath. Endobronchial lesion status post bronchoscopy on 6/23 with biopsy concerning for malignancy-findings on bronchoscopy showed endobronchial tumor noted completely occluding RUL partially invading into bronchus intermedius, necrotic appearing, tumor started to bleed slightly prior to biopsy. Haemophilus influenza pneumonia/supraglottic swelling/strep pharyngitis-pneumonia panel positive for haemophilus influenza.     He presented with sore throat and admitted for strep pharyngitis as his rapid strep test was positive. CT neck showed supraglottitis for which ENT was consulted. He was incidentally found to have partially visualized RUL mass measuring 7.5 x 4.7 cm, obstructing the right upper lobe bronchus with postobstructive atelectasis on CT neck and thus CXR was obtained and revealed RUL opacification with postobstructive atelectasis. This was followed up with CT chest done on 6/21 which showed RUL collapse and ill-defined right. Mediastinal and hilar mass as well as enlarged mediastinal lymph nodes. He underwent bronchoscopy

## 2025-07-24 NOTE — PROGRESS NOTES
Oncology Social Work    Date: 7/24/2025  Time: 12:18 PM  Name: Esdras Marie  MRN: [id number]     Contact Type: Distress Tool Follow-up    Note:   Situation: This staff called Esdras Marie via phone support to introduce myself as the Oncology Social Worker.     Background: Amira was referred to this staff by the Radiation Dept after a recent appointment here. This staff was calling to review the Distress Thermometer provided during their visit.    Coping Score 2    Areas of Concern Identified    Physical Concern: Pain or muscle tension, Sleep disturbance, and Tobacco use / Substance use    Expressive Concern: None selected    Social Concern: None selected    Practical Concern: Finances or ability to pay for care    Existential Concern: None selected    Assessment: This staff had the opportunity to speak with Esdras.. He seemed pleasant as we discussed the call's purpose was to educate about the services provided by the SW. He had no outstanding concerns and shared that he is coping well at this time.  - The opportunity to complete an Advance Directive was offered since it is not available in Medical Records. He and his fiance will consider and let me know if they want to complete.  - No community referrals were placed now because he is too early to know or understand what services he may need. He did explain he has applied for Medicaid and it is in the pending status. Any additional referral services may be considered should his needs regarding assistance change.    Recommendation: Follow-up will be initiated based on need.  provided my contact information and will remain available for support.        ROBBY Gayle, IVETT, ONCCarlosC, IRWIN  Oncology Social Worker      Electronically signed by ROBBY Gayle LSW, ACHP-SW on 7/24/2025 at 12:18 PM

## 2025-07-24 NOTE — TELEPHONE ENCOUNTER
After reviewing the encounter with Francisca , patient was seen in office by ID clinic Dr. Randall  on 7.10.25... encounter was signed yesterday 7.23.25 by Dr. Randall. At the time of the visit ID had signed off...     Dr. Randall, please advise If patient needs further ID follow up . Thank you !

## 2025-07-24 NOTE — TELEPHONE ENCOUNTER
I called Esdras today and told him that we got a message that [facility]de wanted him to be seen with infectious disease for abnormal bronchoscopy findings and he had no idea what I was talking about or who any of the doctors I mentioned we're besides Ede. I told him that I just got a message to schedule him an appt and he did not want to be seen by infectious disease since he did not know what it was for. Please Advise.

## 2025-07-28 LAB — AFB CULTURE & SMEAR: NORMAL

## 2025-08-01 ENCOUNTER — HOSPITAL ENCOUNTER (OUTPATIENT)
Dept: RADIATION ONCOLOGY | Age: 70
Discharge: HOME | End: 2025-08-01
Payer: MEDICARE

## 2025-08-01 PROCEDURE — 77301 RADIOTHERAPY DOSE PLAN IMRT: CPT | Performed by: RADIOLOGY

## 2025-08-01 PROCEDURE — 77300 RADIATION THERAPY DOSE PLAN: CPT | Performed by: RADIOLOGY

## 2025-08-04 ENCOUNTER — HOSPITAL ENCOUNTER (OUTPATIENT)
Dept: RADIATION ONCOLOGY | Age: 70
End: 2025-08-04
Payer: MEDICARE

## 2025-08-04 LAB — AFB CULTURE & SMEAR: NORMAL

## 2025-08-06 ENCOUNTER — HOSPITAL ENCOUNTER (OUTPATIENT)
Dept: RADIATION ONCOLOGY | Age: 70
Discharge: HOME | End: 2025-08-06
Payer: MEDICARE

## 2025-08-06 PROCEDURE — 77386 HC NTSTY MODUL RAD TX DLVR CPLX: CPT | Performed by: RADIOLOGY

## 2025-08-07 ENCOUNTER — HOSPITAL ENCOUNTER (OUTPATIENT)
Dept: RADIATION ONCOLOGY | Age: 70
Discharge: HOME | End: 2025-08-07
Payer: MEDICARE

## 2025-08-07 VITALS
HEART RATE: 86 BPM | WEIGHT: 144.18 LBS | BODY MASS INDEX: 21.93 KG/M2 | OXYGEN SATURATION: 98 % | TEMPERATURE: 98 F | DIASTOLIC BLOOD PRESSURE: 71 MMHG | SYSTOLIC BLOOD PRESSURE: 133 MMHG | RESPIRATION RATE: 18 BRPM

## 2025-08-07 PROCEDURE — 76380 CAT SCAN FOLLOW-UP STUDY: CPT | Performed by: RADIOLOGY

## 2025-08-07 PROCEDURE — 77386 HC NTSTY MODUL RAD TX DLVR CPLX: CPT | Performed by: RADIOLOGY

## 2025-08-08 ENCOUNTER — HOSPITAL ENCOUNTER (OUTPATIENT)
Dept: RADIATION ONCOLOGY | Age: 70
Discharge: HOME | End: 2025-08-08
Payer: MEDICARE

## 2025-08-08 PROCEDURE — 77386 HC NTSTY MODUL RAD TX DLVR CPLX: CPT | Performed by: RADIOLOGY

## 2025-08-11 ENCOUNTER — HOSPITAL ENCOUNTER (OUTPATIENT)
Dept: RADIATION ONCOLOGY | Age: 70
Discharge: HOME | End: 2025-08-11
Payer: MEDICARE

## 2025-08-11 LAB — AFB CULTURE & SMEAR: NORMAL

## 2025-08-11 PROCEDURE — 77336 RADIATION PHYSICS CONSULT: CPT | Performed by: RADIOLOGY

## 2025-08-11 PROCEDURE — 77386 HC NTSTY MODUL RAD TX DLVR CPLX: CPT | Performed by: RADIOLOGY

## 2025-08-12 ENCOUNTER — HOSPITAL ENCOUNTER (OUTPATIENT)
Dept: RADIATION ONCOLOGY | Age: 70
Discharge: HOME | End: 2025-08-12
Payer: MEDICARE

## 2025-08-12 PROCEDURE — 77386 HC NTSTY MODUL RAD TX DLVR CPLX: CPT | Performed by: RADIOLOGY

## 2025-08-13 ENCOUNTER — HOSPITAL ENCOUNTER (OUTPATIENT)
Dept: RADIATION ONCOLOGY | Age: 70
Discharge: HOME | End: 2025-08-13
Payer: MEDICARE

## 2025-08-13 PROCEDURE — 77386 HC NTSTY MODUL RAD TX DLVR CPLX: CPT | Performed by: RADIOLOGY

## 2025-08-14 ENCOUNTER — HOSPITAL ENCOUNTER (OUTPATIENT)
Dept: RADIATION ONCOLOGY | Age: 70
Discharge: HOME | End: 2025-08-14
Payer: MEDICARE

## 2025-08-14 VITALS
TEMPERATURE: 97.7 F | RESPIRATION RATE: 18 BRPM | OXYGEN SATURATION: 97 % | WEIGHT: 138.45 LBS | SYSTOLIC BLOOD PRESSURE: 139 MMHG | BODY MASS INDEX: 21.06 KG/M2 | HEART RATE: 101 BPM | DIASTOLIC BLOOD PRESSURE: 72 MMHG

## 2025-08-14 PROCEDURE — 77386 HC NTSTY MODUL RAD TX DLVR CPLX: CPT | Performed by: RADIOLOGY

## 2025-08-14 ASSESSMENT — PAIN SCALES - GENERAL: PAINLEVEL_OUTOF10: 0

## 2025-08-15 ENCOUNTER — HOSPITAL ENCOUNTER (OUTPATIENT)
Dept: RADIATION ONCOLOGY | Age: 70
Discharge: HOME | End: 2025-08-15
Payer: MEDICARE

## 2025-08-15 PROCEDURE — 77386 HC NTSTY MODUL RAD TX DLVR CPLX: CPT | Performed by: RADIOLOGY

## 2025-08-18 ENCOUNTER — CLINICAL DOCUMENTATION (OUTPATIENT)
Dept: RADIATION ONCOLOGY | Age: 70
End: 2025-08-18

## 2025-08-18 ENCOUNTER — HOSPITAL ENCOUNTER (OUTPATIENT)
Dept: RADIATION ONCOLOGY | Age: 70
Discharge: HOME | End: 2025-08-18
Payer: MEDICARE

## 2025-08-18 PROCEDURE — 77386 HC NTSTY MODUL RAD TX DLVR CPLX: CPT | Performed by: RADIOLOGY

## 2025-08-18 PROCEDURE — 77014 CHG CT GUIDANCE RADIATION THERAPY FLDS PLACEMENT: CPT | Performed by: RADIOLOGY

## 2025-08-18 PROCEDURE — 77336 RADIATION PHYSICS CONSULT: CPT | Performed by: RADIOLOGY

## 2025-08-19 ENCOUNTER — HOSPITAL ENCOUNTER (OUTPATIENT)
Dept: RADIATION ONCOLOGY | Age: 70
Discharge: HOME | End: 2025-08-19
Payer: MEDICARE

## 2025-08-19 PROCEDURE — 77386 HC NTSTY MODUL RAD TX DLVR CPLX: CPT | Performed by: RADIOLOGY

## 2025-08-19 PROCEDURE — 77014 CHG CT GUIDANCE RADIATION THERAPY FLDS PLACEMENT: CPT | Performed by: RADIOLOGY

## 2025-08-19 PROCEDURE — 77336 RADIATION PHYSICS CONSULT: CPT | Performed by: RADIOLOGY
